# Patient Record
Sex: FEMALE | Race: WHITE | NOT HISPANIC OR LATINO | Employment: FULL TIME | URBAN - NONMETROPOLITAN AREA
[De-identification: names, ages, dates, MRNs, and addresses within clinical notes are randomized per-mention and may not be internally consistent; named-entity substitution may affect disease eponyms.]

---

## 2023-02-09 PROBLEM — R03.0 ELEVATED BLOOD PRESSURE READING: Status: ACTIVE | Noted: 2023-02-09

## 2023-02-09 PROBLEM — H10.13 ALLERGIC CONJUNCTIVITIS OF BOTH EYES: Status: ACTIVE | Noted: 2023-02-09

## 2023-02-09 PROBLEM — E03.9 HYPOTHYROIDISM: Status: ACTIVE | Noted: 2023-02-09

## 2023-02-09 PROBLEM — J02.9 SORE THROAT: Status: ACTIVE | Noted: 2023-02-09

## 2023-02-09 PROBLEM — R74.8 ELEVATED LIVER ENZYMES: Status: ACTIVE | Noted: 2023-02-09

## 2023-02-09 PROBLEM — E66.9 OBESITY: Status: ACTIVE | Noted: 2023-02-09

## 2023-02-09 PROBLEM — E04.2 MULTINODULAR GOITER: Status: ACTIVE | Noted: 2023-02-09

## 2023-02-09 PROBLEM — R25.2 LEG CRAMPS: Status: ACTIVE | Noted: 2023-02-09

## 2023-02-09 PROBLEM — J01.90 ACUTE SINUSITIS: Status: ACTIVE | Noted: 2023-02-09

## 2023-02-09 PROBLEM — J30.89 PERENNIAL ALLERGIC RHINITIS: Status: ACTIVE | Noted: 2023-02-09

## 2023-02-09 PROBLEM — M85.80 OSTEOPENIA: Status: ACTIVE | Noted: 2023-02-09

## 2023-02-09 PROBLEM — R05.3 PERSISTENT COUGH: Status: ACTIVE | Noted: 2023-02-09

## 2023-02-09 PROBLEM — J30.2 SEASONAL ALLERGIES: Status: ACTIVE | Noted: 2023-02-09

## 2023-02-09 PROBLEM — E78.5 HYPERLIPIDEMIA: Status: ACTIVE | Noted: 2023-02-09

## 2023-02-09 PROBLEM — F41.8 DEPRESSION WITH ANXIETY: Status: ACTIVE | Noted: 2023-02-09

## 2023-02-09 PROBLEM — J20.9 ACUTE BRONCHITIS: Status: ACTIVE | Noted: 2023-02-09

## 2023-02-09 PROBLEM — N95.1 MENOPAUSAL STATE: Status: ACTIVE | Noted: 2023-02-09

## 2023-02-09 RX ORDER — VITAMIN B COMPLEX
1 CAPSULE ORAL DAILY
COMMUNITY

## 2023-02-09 RX ORDER — ALBUTEROL SULFATE 90 UG/1
2 AEROSOL, METERED RESPIRATORY (INHALATION)
COMMUNITY
End: 2023-06-13 | Stop reason: SDUPTHER

## 2023-02-09 RX ORDER — FLUTICASONE PROPIONATE 50 MCG
2 SPRAY, SUSPENSION (ML) NASAL DAILY
COMMUNITY
Start: 2021-12-20 | End: 2023-06-13 | Stop reason: SDUPTHER

## 2023-02-09 RX ORDER — LORATADINE 10 MG/1
10 TABLET ORAL DAILY
COMMUNITY
End: 2023-06-13

## 2023-02-09 RX ORDER — LEVOTHYROXINE SODIUM 50 UG/1
50 TABLET ORAL DAILY
COMMUNITY
Start: 2020-08-12 | End: 2023-05-24 | Stop reason: SDUPTHER

## 2023-02-09 RX ORDER — MULTIVITAMIN
1 TABLET ORAL DAILY
COMMUNITY

## 2023-02-09 RX ORDER — PAROXETINE 30 MG/1
30 TABLET, FILM COATED ORAL DAILY
COMMUNITY
End: 2023-06-08 | Stop reason: SDUPTHER

## 2023-02-09 RX ORDER — ROSUVASTATIN CALCIUM 20 MG/1
20 TABLET, COATED ORAL DAILY
COMMUNITY
Start: 2022-10-04 | End: 2023-09-12 | Stop reason: SDDI

## 2023-03-27 ENCOUNTER — TELEPHONE (OUTPATIENT)
Dept: PRIMARY CARE | Facility: CLINIC | Age: 73
End: 2023-03-27
Payer: MEDICARE

## 2023-04-03 ENCOUNTER — APPOINTMENT (OUTPATIENT)
Dept: PRIMARY CARE | Facility: CLINIC | Age: 73
End: 2023-04-03
Payer: MEDICARE

## 2023-05-23 ENCOUNTER — TELEPHONE (OUTPATIENT)
Dept: PRIMARY CARE | Facility: CLINIC | Age: 73
End: 2023-05-23
Payer: MEDICARE

## 2023-05-23 DIAGNOSIS — E03.9 ACQUIRED HYPOTHYROIDISM: ICD-10-CM

## 2023-05-24 RX ORDER — LEVOTHYROXINE SODIUM 50 UG/1
50 TABLET ORAL DAILY
Qty: 90 TABLET | Refills: 3 | Status: SHIPPED | OUTPATIENT
Start: 2023-05-24 | End: 2024-05-17 | Stop reason: SDUPTHER

## 2023-06-08 ENCOUNTER — TELEPHONE (OUTPATIENT)
Dept: PRIMARY CARE | Facility: CLINIC | Age: 73
End: 2023-06-08
Payer: MEDICARE

## 2023-06-08 DIAGNOSIS — F41.8 DEPRESSION WITH ANXIETY: ICD-10-CM

## 2023-06-08 RX ORDER — PAROXETINE 30 MG/1
30 TABLET, FILM COATED ORAL DAILY
Qty: 90 TABLET | Refills: 0 | Status: SHIPPED | OUTPATIENT
Start: 2023-06-08 | End: 2023-09-12 | Stop reason: SDUPTHER

## 2023-06-13 ENCOUNTER — OFFICE VISIT (OUTPATIENT)
Dept: PRIMARY CARE | Facility: CLINIC | Age: 73
End: 2023-06-13
Payer: MEDICARE

## 2023-06-13 VITALS
BODY MASS INDEX: 34.87 KG/M2 | HEART RATE: 71 BPM | SYSTOLIC BLOOD PRESSURE: 122 MMHG | OXYGEN SATURATION: 95 % | WEIGHT: 209.3 LBS | HEIGHT: 65 IN | TEMPERATURE: 97.7 F | DIASTOLIC BLOOD PRESSURE: 70 MMHG

## 2023-06-13 DIAGNOSIS — J30.2 SEASONAL ALLERGIES: ICD-10-CM

## 2023-06-13 DIAGNOSIS — R05.3 PERSISTENT COUGH: ICD-10-CM

## 2023-06-13 DIAGNOSIS — J01.40 ACUTE NON-RECURRENT PANSINUSITIS: Primary | ICD-10-CM

## 2023-06-13 PROCEDURE — 1157F ADVNC CARE PLAN IN RCRD: CPT | Performed by: NURSE PRACTITIONER

## 2023-06-13 PROCEDURE — 99213 OFFICE O/P EST LOW 20 MIN: CPT | Performed by: NURSE PRACTITIONER

## 2023-06-13 RX ORDER — DOXYCYCLINE 100 MG/1
100 CAPSULE ORAL 2 TIMES DAILY
Qty: 14 CAPSULE | Refills: 0 | Status: SHIPPED | OUTPATIENT
Start: 2023-06-13 | End: 2023-06-20

## 2023-06-13 RX ORDER — FLUTICASONE PROPIONATE 50 MCG
2 SPRAY, SUSPENSION (ML) NASAL DAILY
Qty: 16 G | Refills: 2 | Status: SHIPPED | OUTPATIENT
Start: 2023-06-13 | End: 2023-09-12 | Stop reason: SDUPTHER

## 2023-06-13 RX ORDER — ALBUTEROL SULFATE 90 UG/1
2 AEROSOL, METERED RESPIRATORY (INHALATION)
Qty: 18 G | Refills: 2 | Status: SHIPPED | OUTPATIENT
Start: 2023-06-13

## 2023-06-13 ASSESSMENT — ENCOUNTER SYMPTOMS
WHEEZING: 0
SINUS PAIN: 1
LIGHT-HEADEDNESS: 0
PALPITATIONS: 0
FACIAL SWELLING: 0
VOMITING: 0
DIZZINESS: 0
COUGH: 1
SHORTNESS OF BREATH: 0
MUSCULOSKELETAL NEGATIVE: 1
HEADACHES: 1
NAUSEA: 0
SINUS PRESSURE: 1
SORE THROAT: 1
MYALGIAS: 0
TROUBLE SWALLOWING: 0
VOICE CHANGE: 0
ARTHRALGIAS: 0

## 2023-06-13 NOTE — LETTER
June 13, 2023     Patient: Dunia Moreau   YOB: 1950   Date of Visit: 6/13/2023       To Whom It May Concern:    Dunia Moreau was seen in my clinic on 6/13/2023 at 10:30 am. Please excuse Dunia for her absence from work on this day to make the appointment.  Please excuse her from 06/13/2023 through 06/15/2023 for illness.   Dunia may return to work on 06/16/2023.    If you have any questions or concerns, please don't hesitate to call.         Sincerely,         Jason Malone, APRN-CNP        CC: No Recipients

## 2023-06-13 NOTE — PROGRESS NOTES
"Subjective   Patient ID: Dunia Moreau is a 72 y.o. female who presents for Headache (Has been going on a month), Cough, and Nasal Congestion.    Cough  Patient complains of facial pain, fever, headache, myalgias, nasal congestion, nonproductive cough, and rhinorrhea. Symptoms began 1 month ago. Symptoms have been gradually worsening since that time.The cough is hoarse and is aggravated by exercise. Associated symptoms include: postnasal drip. Patient does not have new pets. Patient does not have a history of asthma. Patient does not have a history of environmental allergens. Patient has not traveled recently. Patient does not have a history of smoking. Patient has not had a previous chest x-ray. Patient has not had a PPD done.    Sinus pain, allergies \"most of the time\"  Ran out of nasal spray  Cough, sore throat, post nasal drip  Sx have slowly worsened over last month  Denies fever, does report chills/fatigue         Review of Systems   HENT:  Positive for congestion, postnasal drip, sinus pressure, sinus pain and sore throat. Negative for ear discharge, ear pain, facial swelling, trouble swallowing and voice change.    Eyes:  Negative for visual disturbance.   Respiratory:  Positive for cough. Negative for shortness of breath and wheezing.    Cardiovascular:  Negative for chest pain and palpitations.   Gastrointestinal:  Negative for nausea and vomiting.   Musculoskeletal: Negative.  Negative for arthralgias and myalgias.   Skin: Negative.    Neurological:  Positive for headaches. Negative for dizziness and light-headedness.       Objective   /70 (Patient Position: Sitting)   Pulse 71   Temp 36.5 °C (97.7 °F)   Ht 1.651 m (5' 5\")   Wt 94.9 kg (209 lb 4.8 oz)   SpO2 95%   BMI 34.83 kg/m²     Physical Exam  HENT:      Right Ear: Hearing, tympanic membrane and ear canal normal.      Left Ear: Hearing, tympanic membrane and ear canal normal.      Nose: Congestion and rhinorrhea present.      Right " Sinus: Maxillary sinus tenderness and frontal sinus tenderness present.      Left Sinus: Maxillary sinus tenderness and frontal sinus tenderness present.      Mouth/Throat:      Pharynx: Posterior oropharyngeal erythema present.   Cardiovascular:      Rate and Rhythm: Normal rate and regular rhythm.      Pulses: Normal pulses.   Pulmonary:      Effort: Pulmonary effort is normal.      Breath sounds: Normal breath sounds.   Abdominal:      General: Bowel sounds are normal.      Palpations: Abdomen is soft.      Tenderness: There is no abdominal tenderness.      Hernia: No hernia is present.   Musculoskeletal:         General: Normal range of motion.      Cervical back: Normal range of motion. No tenderness.   Lymphadenopathy:      Cervical: Cervical adenopathy present.      Right cervical: Superficial cervical adenopathy present.      Left cervical: Superficial cervical adenopathy present.   Skin:     General: Skin is warm and dry.   Neurological:      Mental Status: She is alert and oriented to person, place, and time.         Assessment/Plan   Diagnoses and all orders for this visit:  Acute non-recurrent pansinusitis  -     fluticasone (Flonase) 50 mcg/actuation nasal spray; Administer 2 sprays into each nostril once daily.  -     doxycycline (Vibramycin) 100 mg capsule; Take 1 capsule (100 mg) by mouth 2 times a day for 7 days. Take with at least 8 ounces (large glass) of water, do not lie down for 30 minutes after  Seasonal allergies  -     albuterol 90 mcg/actuation inhaler; Inhale 2 puffs 4 times a day.  Persistent cough  -     albuterol 90 mcg/actuation inhaler; Inhale 2 puffs 4 times a day.

## 2023-09-12 ENCOUNTER — LAB (OUTPATIENT)
Dept: LAB | Facility: LAB | Age: 73
End: 2023-09-12
Payer: MEDICARE

## 2023-09-12 ENCOUNTER — TELEPHONE (OUTPATIENT)
Dept: PRIMARY CARE | Facility: CLINIC | Age: 73
End: 2023-09-12

## 2023-09-12 ENCOUNTER — OFFICE VISIT (OUTPATIENT)
Dept: PRIMARY CARE | Facility: CLINIC | Age: 73
End: 2023-09-12
Payer: MEDICARE

## 2023-09-12 VITALS
WEIGHT: 209.9 LBS | HEART RATE: 55 BPM | HEIGHT: 65 IN | BODY MASS INDEX: 34.97 KG/M2 | DIASTOLIC BLOOD PRESSURE: 80 MMHG | SYSTOLIC BLOOD PRESSURE: 122 MMHG

## 2023-09-12 DIAGNOSIS — R73.01 ELEVATED FASTING BLOOD SUGAR: ICD-10-CM

## 2023-09-12 DIAGNOSIS — Z13.6 SCREENING FOR CARDIOVASCULAR CONDITION: ICD-10-CM

## 2023-09-12 DIAGNOSIS — E03.9 ACQUIRED HYPOTHYROIDISM: ICD-10-CM

## 2023-09-12 DIAGNOSIS — Z71.89 CARDIAC RISK COUNSELING: ICD-10-CM

## 2023-09-12 DIAGNOSIS — Z53.20 STATIN MEDICATION DECLINED BY PATIENT: ICD-10-CM

## 2023-09-12 DIAGNOSIS — Z12.31 ENCOUNTER FOR SCREENING MAMMOGRAM FOR BREAST CANCER: ICD-10-CM

## 2023-09-12 DIAGNOSIS — Z00.00 ROUTINE GENERAL MEDICAL EXAMINATION AT HEALTH CARE FACILITY: Primary | ICD-10-CM

## 2023-09-12 DIAGNOSIS — Z12.12 SCREENING FOR COLORECTAL CANCER: ICD-10-CM

## 2023-09-12 DIAGNOSIS — F41.8 DEPRESSION WITH ANXIETY: ICD-10-CM

## 2023-09-12 DIAGNOSIS — Z13.1 DIABETES MELLITUS SCREENING: ICD-10-CM

## 2023-09-12 DIAGNOSIS — Z12.11 SCREENING FOR COLORECTAL CANCER: ICD-10-CM

## 2023-09-12 DIAGNOSIS — J01.40 ACUTE NON-RECURRENT PANSINUSITIS: ICD-10-CM

## 2023-09-12 DIAGNOSIS — Z91.199 MEDICAL NON-COMPLIANCE: ICD-10-CM

## 2023-09-12 LAB
ALANINE AMINOTRANSFERASE (SGPT) (U/L) IN SER/PLAS: 38 U/L (ref 7–45)
ALBUMIN (G/DL) IN SER/PLAS: 3.8 G/DL (ref 3.4–5)
ALKALINE PHOSPHATASE (U/L) IN SER/PLAS: 110 U/L (ref 33–136)
ANION GAP IN SER/PLAS: 9 MMOL/L (ref 10–20)
ASPARTATE AMINOTRANSFERASE (SGOT) (U/L) IN SER/PLAS: 37 U/L (ref 9–39)
BILIRUBIN TOTAL (MG/DL) IN SER/PLAS: 1.1 MG/DL (ref 0–1.2)
CALCIUM (MG/DL) IN SER/PLAS: 9.2 MG/DL (ref 8.6–10.3)
CARBON DIOXIDE, TOTAL (MMOL/L) IN SER/PLAS: 30 MMOL/L (ref 21–32)
CHLORIDE (MMOL/L) IN SER/PLAS: 104 MMOL/L (ref 98–107)
CHOLESTEROL (MG/DL) IN SER/PLAS: 243 MG/DL (ref 0–199)
CHOLESTEROL IN HDL (MG/DL) IN SER/PLAS: 65 MG/DL
CHOLESTEROL/HDL RATIO: 3.7
CREATININE (MG/DL) IN SER/PLAS: 0.81 MG/DL (ref 0.5–1.05)
ESTIMATED AVERAGE GLUCOSE FOR HBA1C: 114 MG/DL
GFR FEMALE: 76 ML/MIN/1.73M2
GLUCOSE (MG/DL) IN SER/PLAS: 108 MG/DL (ref 74–99)
HEMOGLOBIN A1C/HEMOGLOBIN TOTAL IN BLOOD: 5.6 %
LDL: 157 MG/DL (ref 0–99)
POTASSIUM (MMOL/L) IN SER/PLAS: 4.4 MMOL/L (ref 3.5–5.3)
PROTEIN TOTAL: 6.4 G/DL (ref 6.4–8.2)
SODIUM (MMOL/L) IN SER/PLAS: 139 MMOL/L (ref 136–145)
THYROTROPIN (MIU/L) IN SER/PLAS BY DETECTION LIMIT <= 0.05 MIU/L: 4.07 MIU/L (ref 0.44–3.98)
THYROXINE (T4) FREE (NG/DL) IN SER/PLAS: 0.94 NG/DL (ref 0.61–1.12)
TRIGLYCERIDE (MG/DL) IN SER/PLAS: 106 MG/DL (ref 0–149)
UREA NITROGEN (MG/DL) IN SER/PLAS: 20 MG/DL (ref 6–23)
VLDL: 21 MG/DL (ref 0–40)

## 2023-09-12 PROCEDURE — 1170F FXNL STATUS ASSESSED: CPT | Performed by: NURSE PRACTITIONER

## 2023-09-12 PROCEDURE — 1036F TOBACCO NON-USER: CPT | Performed by: NURSE PRACTITIONER

## 2023-09-12 PROCEDURE — 1159F MED LIST DOCD IN RCRD: CPT | Performed by: NURSE PRACTITIONER

## 2023-09-12 PROCEDURE — 84439 ASSAY OF FREE THYROXINE: CPT

## 2023-09-12 PROCEDURE — 1157F ADVNC CARE PLAN IN RCRD: CPT | Performed by: NURSE PRACTITIONER

## 2023-09-12 PROCEDURE — 80061 LIPID PANEL: CPT

## 2023-09-12 PROCEDURE — 36415 COLL VENOUS BLD VENIPUNCTURE: CPT

## 2023-09-12 PROCEDURE — 80053 COMPREHEN METABOLIC PANEL: CPT

## 2023-09-12 PROCEDURE — 84443 ASSAY THYROID STIM HORMONE: CPT

## 2023-09-12 PROCEDURE — 99213 OFFICE O/P EST LOW 20 MIN: CPT | Performed by: NURSE PRACTITIONER

## 2023-09-12 PROCEDURE — 83036 HEMOGLOBIN GLYCOSYLATED A1C: CPT

## 2023-09-12 PROCEDURE — G0439 PPPS, SUBSEQ VISIT: HCPCS | Performed by: NURSE PRACTITIONER

## 2023-09-12 PROCEDURE — 1160F RVW MEDS BY RX/DR IN RCRD: CPT | Performed by: NURSE PRACTITIONER

## 2023-09-12 RX ORDER — PAROXETINE 30 MG/1
30 TABLET, FILM COATED ORAL DAILY
Qty: 90 TABLET | Refills: 1 | Status: SHIPPED | OUTPATIENT
Start: 2023-09-12 | End: 2023-09-12 | Stop reason: SDUPTHER

## 2023-09-12 RX ORDER — UBIDECARENONE 30 MG
30 CAPSULE ORAL DAILY
COMMUNITY

## 2023-09-12 RX ORDER — OMEGA-3-ACID ETHYL ESTERS 1 G/1
1 CAPSULE, LIQUID FILLED ORAL DAILY
COMMUNITY

## 2023-09-12 RX ORDER — FLUTICASONE PROPIONATE 50 MCG
2 SPRAY, SUSPENSION (ML) NASAL DAILY
Qty: 16 G | Refills: 2 | Status: SHIPPED | OUTPATIENT
Start: 2023-09-12

## 2023-09-12 RX ORDER — PAROXETINE 30 MG/1
30 TABLET, FILM COATED ORAL DAILY
Qty: 90 TABLET | Refills: 1 | Status: SHIPPED | OUTPATIENT
Start: 2023-09-12 | End: 2024-03-10

## 2023-09-12 ASSESSMENT — PATIENT HEALTH QUESTIONNAIRE - PHQ9
1. LITTLE INTEREST OR PLEASURE IN DOING THINGS: NOT AT ALL
2. FEELING DOWN, DEPRESSED OR HOPELESS: NOT AT ALL
SUM OF ALL RESPONSES TO PHQ9 QUESTIONS 1 AND 2: 0
1. LITTLE INTEREST OR PLEASURE IN DOING THINGS: NOT AT ALL
SUM OF ALL RESPONSES TO PHQ9 QUESTIONS 1 AND 2: 0
2. FEELING DOWN, DEPRESSED OR HOPELESS: NOT AT ALL

## 2023-09-12 ASSESSMENT — ACTIVITIES OF DAILY LIVING (ADL)
TAKING_MEDICATION: INDEPENDENT
DOING_HOUSEWORK: INDEPENDENT
MANAGING_FINANCES: INDEPENDENT
DRESSING: INDEPENDENT
GROCERY_SHOPPING: INDEPENDENT
BATHING: INDEPENDENT

## 2023-09-12 ASSESSMENT — ENCOUNTER SYMPTOMS
DEPRESSION: 0
LOSS OF SENSATION IN FEET: 0
OCCASIONAL FEELINGS OF UNSTEADINESS: 0

## 2023-09-12 NOTE — PROGRESS NOTES
"Subjective   Patient ID: Dunia Moreau is a 73 y.o. female who presents for Medicare Annual Wellness Visit Subsequent.    Vaccine UTD  Declines Flu vaccine  No prior colonoscopy, would like to get colonoscopy   Annual labs due  Declines statin therapy despite 1.5 ASCVD risk, discussed risks with patient.          Review of Systems    Objective   /80 (Patient Position: Sitting)   Pulse 55   Ht 1.651 m (5' 5\")   Wt 95.2 kg (209 lb 14.4 oz)   BMI 34.93 kg/m²     Physical Exam  Vitals reviewed.   Constitutional:       General: She is not in acute distress.     Appearance: Normal appearance. She is obese.   Cardiovascular:      Rate and Rhythm: Normal rate and regular rhythm.      Pulses: Normal pulses.   Pulmonary:      Effort: Pulmonary effort is normal.      Breath sounds: Normal breath sounds.   Abdominal:      General: Bowel sounds are normal.      Palpations: Abdomen is soft.      Tenderness: There is no guarding.   Skin:     General: Skin is warm and dry.   Neurological:      Mental Status: She is alert and oriented to person, place, and time.   Psychiatric:         Mood and Affect: Mood normal.         Assessment/Plan   Diagnoses and all orders for this visit:  Routine general medical examination at health care facility  Acute non-recurrent pansinusitis  -     fluticasone (Flonase) 50 mcg/actuation nasal spray; Administer 2 sprays into each nostril once daily.  Depression with anxiety  -     Comprehensive Metabolic Panel; Future  Statin medication declined by patient  Medical non-compliance  Cardiac risk counseling   -Patent is aware of the risks associated with elevated cholesterol with LDL >150.   Elevated fasting blood sugar  -     Hemoglobin A1C; Future  Diabetes mellitus screening  -     Hemoglobin A1C; Future  Screening for cardiovascular condition  -     Lipid panel; Future  Encounter for screening mammogram for breast cancer  -     BI mammo bilateral screening tomosynthesis; Future  Screening " for colorectal cancer  -     Colonoscopy Screening; Future  Acquired hypothyroidism  -     TSH with reflex to Free T4 if abnormal; Future  -Continue on current dose of levothyroxine, will adjust as needed based on lab result  Other orders  -     Follow Up In Primary Care - Health Maintenance; Future

## 2023-09-12 NOTE — PROGRESS NOTES
"Advance Care Planning Note     Discussion Date: 09/12/23   Discussion Participants: patient    The patient wishes to discuss Advance Care Planning today and the following is a brief summary of our discussion.     Patient has capacity to make their own medical decisions: Yes  Health Care Agent/Surrogate Decision Maker documented in chart: Yes son Darell Jarrell or daughter Bruno Moreau     Documents on file and valid:  Advance Directive/Living Will:  has but not on file in office    Health Care Power of : Yes  Other: Unsure regarding DNR, for now full code    Communication of Medical Status/Prognosis:   Sates she is a organ donor, currently medical prognosis is good     Communication of Treatment Goals/Options:        Treatment Decisions  Goals of Care: quality of life is prioritized; willing to accept low-burden treatments to achieve meaningful goals   \"No heroic efforts\"  Follow Up Plan  Annually   Team Members  Patient and provider  Time Statement: Total face to face time spent on advance care planning was 20 minutes with 8 minutes spent in counseling, including the explanation.    Jason Malone, JOSE ELIAS-CNP  9/12/2023 9:45 AM  "

## 2023-10-17 ENCOUNTER — HOSPITAL ENCOUNTER (OUTPATIENT)
Dept: GASTROENTEROLOGY | Facility: CLINIC | Age: 73
Discharge: HOME | End: 2023-10-17
Payer: MEDICARE

## 2023-10-17 VITALS
SYSTOLIC BLOOD PRESSURE: 142 MMHG | WEIGHT: 203.71 LBS | TEMPERATURE: 97.4 F | BODY MASS INDEX: 34.78 KG/M2 | RESPIRATION RATE: 16 BRPM | HEART RATE: 51 BPM | OXYGEN SATURATION: 98 % | HEIGHT: 64 IN | DIASTOLIC BLOOD PRESSURE: 62 MMHG

## 2023-10-17 DIAGNOSIS — Z12.12 SCREENING FOR COLORECTAL CANCER: Primary | ICD-10-CM

## 2023-10-17 DIAGNOSIS — Z12.11 SCREENING FOR COLORECTAL CANCER: Primary | ICD-10-CM

## 2023-10-17 PROCEDURE — 99153 MOD SED SAME PHYS/QHP EA: CPT | Performed by: INTERNAL MEDICINE

## 2023-10-17 PROCEDURE — 88305 TISSUE EXAM BY PATHOLOGIST: CPT | Mod: TC,SAMLAB | Performed by: INTERNAL MEDICINE

## 2023-10-17 PROCEDURE — 7100000009 HC PHASE TWO TIME - INITIAL BASE CHARGE

## 2023-10-17 PROCEDURE — 3700000013 HC SEDATION LEVEL 5+ TIME - EACH ADDITIONAL 15 MINUTES

## 2023-10-17 PROCEDURE — 2500000004 HC RX 250 GENERAL PHARMACY W/ HCPCS (ALT 636 FOR OP/ED): Performed by: INTERNAL MEDICINE

## 2023-10-17 PROCEDURE — 2580000001 HC RX 258 IV SOLUTIONS: Performed by: INTERNAL MEDICINE

## 2023-10-17 PROCEDURE — 45380 COLONOSCOPY AND BIOPSY: CPT | Performed by: INTERNAL MEDICINE

## 2023-10-17 PROCEDURE — 3700000012 HC SEDATION LEVEL 5+ TIME - INITIAL 15 MINUTES 5/> YEARS

## 2023-10-17 PROCEDURE — 7100000010 HC PHASE TWO TIME - EACH INCREMENTAL 1 MINUTE

## 2023-10-17 PROCEDURE — 88305 TISSUE EXAM BY PATHOLOGIST: CPT | Mod: TC,SUR,SAMLAB | Performed by: INTERNAL MEDICINE

## 2023-10-17 PROCEDURE — 99152 MOD SED SAME PHYS/QHP 5/>YRS: CPT | Performed by: INTERNAL MEDICINE

## 2023-10-17 PROCEDURE — 88305 TISSUE EXAM BY PATHOLOGIST: CPT

## 2023-10-17 RX ORDER — MEPERIDINE HYDROCHLORIDE 25 MG/ML
INJECTION INTRAMUSCULAR; INTRAVENOUS; SUBCUTANEOUS AS NEEDED
Status: COMPLETED | OUTPATIENT
Start: 2023-10-17 | End: 2023-10-17

## 2023-10-17 RX ORDER — SODIUM CHLORIDE, SODIUM LACTATE, POTASSIUM CHLORIDE, CALCIUM CHLORIDE 600; 310; 30; 20 MG/100ML; MG/100ML; MG/100ML; MG/100ML
20 INJECTION, SOLUTION INTRAVENOUS CONTINUOUS
Status: DISCONTINUED | OUTPATIENT
Start: 2023-10-17 | End: 2023-10-20 | Stop reason: HOSPADM

## 2023-10-17 RX ORDER — MIDAZOLAM HYDROCHLORIDE 5 MG/ML
INJECTION, SOLUTION INTRAMUSCULAR; INTRAVENOUS AS NEEDED
Status: COMPLETED | OUTPATIENT
Start: 2023-10-17 | End: 2023-10-17

## 2023-10-17 RX ADMIN — MIDAZOLAM HYDROCHLORIDE 2.5 MG: 5 INJECTION, SOLUTION INTRAMUSCULAR; INTRAVENOUS at 09:43

## 2023-10-17 RX ADMIN — MEPERIDINE HYDROCHLORIDE 25 MG: 25 INJECTION INTRAMUSCULAR; INTRAVENOUS; SUBCUTANEOUS at 09:38

## 2023-10-17 RX ADMIN — MIDAZOLAM HYDROCHLORIDE 1 MG: 5 INJECTION, SOLUTION INTRAMUSCULAR; INTRAVENOUS at 09:48

## 2023-10-17 RX ADMIN — GLUCAGON HYDROCHLORIDE 1 MG: KIT at 09:39

## 2023-10-17 RX ADMIN — SODIUM CHLORIDE, POTASSIUM CHLORIDE, SODIUM LACTATE AND CALCIUM CHLORIDE 20 ML/HR: 600; 310; 30; 20 INJECTION, SOLUTION INTRAVENOUS at 08:52

## 2023-10-17 RX ADMIN — MIDAZOLAM HYDROCHLORIDE 2.5 MG: 5 INJECTION, SOLUTION INTRAMUSCULAR; INTRAVENOUS at 09:38

## 2023-10-17 RX ADMIN — MEPERIDINE HYDROCHLORIDE 25 MG: 25 INJECTION INTRAMUSCULAR; INTRAVENOUS; SUBCUTANEOUS at 09:42

## 2023-10-17 ASSESSMENT — PAIN SCALES - GENERAL
PAINLEVEL_OUTOF10: 2
PAINLEVEL_OUTOF10: 0 - NO PAIN
PAINLEVEL_OUTOF10: 2
PAINLEVEL_OUTOF10: 0 - NO PAIN
PAINLEVEL_OUTOF10: 2
PAINLEVEL_OUTOF10: 0 - NO PAIN

## 2023-10-17 ASSESSMENT — PAIN - FUNCTIONAL ASSESSMENT: PAIN_FUNCTIONAL_ASSESSMENT: 0-10

## 2023-10-17 NOTE — H&P
History Of Present Illness  Erin Moreau is a 73 y.o. female presenting with desire for screening colonoscopy.     Past Medical History  Past Medical History:   Diagnosis Date    Anxiety     Depression     Disease of thyroid gland     Hypothyroid        Surgical History  Past Surgical History:   Procedure Laterality Date    OTHER SURGICAL HISTORY  12/09/2019    Bunionectomy    OTHER SURGICAL HISTORY  12/09/2019    Hysteroscopy    OTHER SURGICAL HISTORY  12/09/2019    Loop electrosurgical excision procedure    OTHER SURGICAL HISTORY  12/09/2019    Dilation and curettage    OTHER SURGICAL HISTORY  12/09/2019    Esophagogastroduodenoscopy    OTHER SURGICAL HISTORY  12/09/2019    Tonsillectomy    OTHER SURGICAL HISTORY  09/30/2020    Hip replacement        Social History  She reports that she has never smoked. She has never used smokeless tobacco. She reports that she does not drink alcohol and does not use drugs.    Family History  No family history on file.     Allergies  Amoxicillin, Biotin, Penicillins, and Pravastatin    Review of Systems   All other systems reviewed and are negative.       Physical Exam  Vitals reviewed.   Constitutional:       General: She is awake.   Cardiovascular:      Comments: No overt chest pain  Pulmonary:      Effort: Pulmonary effort is normal.      Breath sounds: Normal breath sounds.   Abdominal:      General: Bowel sounds are normal.      Palpations: Abdomen is soft.      Tenderness: There is no abdominal tenderness.   Neurological:      Mental Status: She is alert and oriented to person, place, and time.   Psychiatric:         Attention and Perception: Attention and perception normal.         Behavior: Behavior normal.          Last Recorded Vitals  There were no vitals taken for this visit.    Relevant Results             Assessment/Plan   Active Problems:  There are no active Hospital Problems.      Problem List Items Addressed This Visit       Screening for colorectal cancer -  Primary    Relevant Orders    Colonoscopy Screening            I spent  minutes in the professional and overall care of this patient.      Ede Nunez, DO

## 2023-10-17 NOTE — PRE-SEDATION DOCUMENTATION
Patient: Erin Moreau  MRN: [id number]    Pre-sedation Evaluation:  Sedation necessary for: Analgesia  Requesting service: GI    History of Present Illness: Colon cancer screening     Past Medical History:   Diagnosis Date    Anxiety     Depression     Disease of thyroid gland     Hypothyroid        Principle problems:  Patient Active Problem List    Diagnosis Date Noted    Screening for colorectal cancer 10/17/2023    Acute bronchitis 02/09/2023    Acute sinusitis 02/09/2023    Allergic conjunctivitis of both eyes 02/09/2023    Depression with anxiety 02/09/2023    Elevated blood pressure reading 02/09/2023    Elevated liver enzymes 02/09/2023    Hyperlipidemia 02/09/2023    Hypothyroidism 02/09/2023    Leg cramps 02/09/2023    Menopausal state 02/09/2023    Multinodular goiter 02/09/2023    Obesity 02/09/2023    Osteopenia 02/09/2023    Perennial allergic rhinitis 02/09/2023    Persistent cough 02/09/2023    Seasonal allergies 02/09/2023    Sore throat 02/09/2023     Allergies:  Allergies   Allergen Reactions    Amoxicillin Itching    Biotin Swelling    Penicillins Itching    Pravastatin Other     PTA/Current Medications:  (Not in a hospital admission)    Current Outpatient Medications   Medication Sig Dispense Refill    fluticasone (Flonase) 50 mcg/actuation nasal spray Administer 2 sprays into each nostril once daily. 16 g 2    levothyroxine (Synthroid, Levoxyl) 50 mcg tablet Take 1 tablet (50 mcg) by mouth once daily. 90 tablet 3    PARoxetine (Paxil) 30 mg tablet Take 1 tablet (30 mg) by mouth once daily. 90 tablet 1    albuterol 90 mcg/actuation inhaler Inhale 2 puffs 4 times a day. (Patient not taking: Reported on 10/17/2023) 18 g 2    b complex vitamins (Vitamins B Complex) capsule Take 1 capsule by mouth once daily.      CALCIUM CITRATE-VITAMIN D3 ORAL Take 1 tablet by mouth 1 (one) time each day.      co-enzyme Q-10 30 mg capsule Take 1 capsule (30 mg) by mouth once daily.      magnesium oxide-Mg AA  chelate (Magnesium, oxide/AA chelate,) 300 mg capsule Take 1 capsule (300 mg) by mouth once daily.      multivitamin tablet Take 1 tablet by mouth once daily.      omega-3 acid ethyl esters (Lovaza) 1 gram capsule Take 1 capsule (1 g) by mouth once daily.       Current Facility-Administered Medications   Medication Dose Route Frequency Provider Last Rate Last Admin    lactated Ringer's infusion  20 mL/hr intravenous Continuous Ede Nunez DO         Past Surgical History:   has a past surgical history that includes Other surgical history (12/09/2019); Other surgical history (12/09/2019); Other surgical history (12/09/2019); Other surgical history (12/09/2019); Other surgical history (12/09/2019); Other surgical history (12/09/2019); and Other surgical history (09/30/2020).    Recent sedation/surgery (24 hours) No    Review of Systems:  Please check all that apply: No significant medical history    Pregnancy test completed prior to procedure on any menstruating female: none        NPO guidelines met: yes    Physical Exam    Airway  Mallampati: II     Cardiovascular   Rhythm: regular  Rate: normal     Dental    Pulmonary - normal exam         Plan    ASA 2

## 2023-10-18 ENCOUNTER — TREATMENT (OUTPATIENT)
Dept: PHYSICAL THERAPY | Facility: CLINIC | Age: 73
End: 2023-10-18

## 2023-10-18 DIAGNOSIS — M25.551 RIGHT HIP PAIN: Primary | ICD-10-CM

## 2023-10-18 DIAGNOSIS — M53.86 SCIATICA OF RIGHT SIDE ASSOCIATED WITH DISORDER OF LUMBAR SPINE: ICD-10-CM

## 2023-10-18 PROCEDURE — 4200000004 HC PT PHASE II 15 MIN CHG: Mod: GP | Performed by: PHYSICAL THERAPIST

## 2023-10-18 ASSESSMENT — PAIN - FUNCTIONAL ASSESSMENT: PAIN_FUNCTIONAL_ASSESSMENT: 0-10

## 2023-10-18 ASSESSMENT — PAIN SCALES - GENERAL: PAINLEVEL_OUTOF10: 3

## 2023-10-18 NOTE — PROGRESS NOTES
Physical Therapy Treatment    Patient Name: Dunia Moreau  MRN: [id number]  Today's Date: 10/18/2023         Assessment:   TDN performed by Madalyn Zabala, PT, DPT at end of session without any adverse side effects and verbalized good understanding of all edu provided concerning TDN. Added Estim to TDN with good response and ended session with cryotherapy.    Plan:   Up to 1xweek PRN     Current Problem  1. Right hip pain        2. 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   Estim to TDN x 8'  Cryotherapy after session       Goals:

## 2023-10-24 LAB
LABORATORY COMMENT REPORT: NORMAL
PATH REPORT.COMMENTS IMP SPEC: NORMAL
PATH REPORT.FINAL DX SPEC: NORMAL
PATH REPORT.GROSS SPEC: NORMAL
PATH REPORT.RELEVANT HX SPEC: NORMAL
PATH REPORT.TOTAL CANCER: NORMAL

## 2023-10-25 ENCOUNTER — TELEPHONE (OUTPATIENT)
Dept: GASTROENTEROLOGY | Facility: CLINIC | Age: 73
End: 2023-10-25
Payer: MEDICARE

## 2023-10-25 NOTE — TELEPHONE ENCOUNTER
----- Message from Ede Nunez DO sent at 10/25/2023  7:37 AM EDT -----  Polyp from rectum was benign colonic tissue likely representing rectal prolapse tissue.  There is no increased risk of colon cancer.  Given her age and no family history of colon cancer would recommend no further screening.  Colonoscopy being performed only if symptomatic

## 2023-11-20 ENCOUNTER — OFFICE VISIT (OUTPATIENT)
Dept: PRIMARY CARE | Facility: CLINIC | Age: 73
End: 2023-11-20
Payer: MEDICARE

## 2023-11-20 ENCOUNTER — ANCILLARY PROCEDURE (OUTPATIENT)
Dept: RADIOLOGY | Facility: CLINIC | Age: 73
End: 2023-11-20
Payer: MEDICARE

## 2023-11-20 VITALS
HEIGHT: 64 IN | HEART RATE: 64 BPM | WEIGHT: 206 LBS | BODY MASS INDEX: 35.17 KG/M2 | TEMPERATURE: 98 F | SYSTOLIC BLOOD PRESSURE: 150 MMHG | DIASTOLIC BLOOD PRESSURE: 82 MMHG

## 2023-11-20 DIAGNOSIS — M25.562 ACUTE PAIN OF LEFT KNEE: Primary | ICD-10-CM

## 2023-11-20 DIAGNOSIS — M25.562 ACUTE PAIN OF LEFT KNEE: ICD-10-CM

## 2023-11-20 PROCEDURE — 1160F RVW MEDS BY RX/DR IN RCRD: CPT

## 2023-11-20 PROCEDURE — 73560 X-RAY EXAM OF KNEE 1 OR 2: CPT | Mod: LT

## 2023-11-20 PROCEDURE — 1125F AMNT PAIN NOTED PAIN PRSNT: CPT

## 2023-11-20 PROCEDURE — 1036F TOBACCO NON-USER: CPT

## 2023-11-20 PROCEDURE — 99213 OFFICE O/P EST LOW 20 MIN: CPT

## 2023-11-20 PROCEDURE — 1159F MED LIST DOCD IN RCRD: CPT

## 2023-11-20 PROCEDURE — 73560 X-RAY EXAM OF KNEE 1 OR 2: CPT | Mod: LEFT SIDE | Performed by: RADIOLOGY

## 2023-11-20 RX ORDER — MELOXICAM 15 MG/1
15 TABLET ORAL DAILY
Qty: 30 TABLET | Refills: 0 | Status: SHIPPED | OUTPATIENT
Start: 2023-11-20 | End: 2023-12-20

## 2023-11-20 RX ORDER — CEPHALEXIN 500 MG/1
500 CAPSULE ORAL 4 TIMES DAILY
Qty: 28 CAPSULE | Refills: 0 | Status: SHIPPED | OUTPATIENT
Start: 2023-11-20 | End: 2023-12-01 | Stop reason: ALTCHOICE

## 2023-11-20 ASSESSMENT — ENCOUNTER SYMPTOMS
RESPIRATORY NEGATIVE: 1
CARDIOVASCULAR NEGATIVE: 1
GASTROINTESTINAL NEGATIVE: 1
CONSTITUTIONAL NEGATIVE: 1

## 2023-11-20 NOTE — PROGRESS NOTES
"Subjective   Patient ID: Erin Moreau is a 73 y.o. female who presents for pt c/o left knee pain, swelling x 5 days .    HPI   KNEE PAIN: 5 days L knee pain, hx of abscess which was lanced about 25 years ago to the front of this knee. No recent travel. Wells criteria score of 0 for DVT. Pain is to anterior knee. Mild swelling to knee. No erythema or significant warmth. Advil somewhat helpful. She does stand and walk for her occupation.    Review of Systems   Constitutional: Negative.    Respiratory: Negative.     Cardiovascular: Negative.    Gastrointestinal: Negative.        Objective   /82   Pulse 64   Temp 36.7 °C (98 °F)   Ht 1.622 m (5' 3.86\")   Wt 93.4 kg (206 lb)   BMI 35.52 kg/m²     Physical Exam  Constitutional:       General: She is not in acute distress.     Appearance: Normal appearance. She is not ill-appearing.   HENT:      Head: Normocephalic and atraumatic.   Eyes:      Extraocular Movements: Extraocular movements intact.      Conjunctiva/sclera: Conjunctivae normal.   Cardiovascular:      Rate and Rhythm: Normal rate.   Pulmonary:      Effort: Pulmonary effort is normal.   Abdominal:      General: There is no distension.   Musculoskeletal:         General: No tenderness or signs of injury. Normal range of motion.      Cervical back: Normal range of motion.      Comments: No tenderness to palpation of L knee joint, there is a popliteal cyst to back of L knee   Skin:     General: Skin is warm and dry.      Findings: No erythema or rash.   Neurological:      General: No focal deficit present.      Mental Status: She is alert and oriented to person, place, and time.   Psychiatric:         Mood and Affect: Mood normal.         Behavior: Behavior normal.         Thought Content: Thought content normal.         Judgment: Judgment normal.         Assessment/Plan        Rx Keflex x5 days  Rx meloxicam  Knee xray today  Advised use Volteran gel/RICE at home  Advised to call if symptoms not " improving

## 2023-11-28 ENCOUNTER — TELEPHONE (OUTPATIENT)
Dept: PRIMARY CARE | Facility: CLINIC | Age: 73
End: 2023-11-28
Payer: MEDICARE

## 2023-11-28 NOTE — TELEPHONE ENCOUNTER
Patient called and said she has been off work since 11/19. Im not sure what this is about but wanted me to give the message to you

## 2023-12-01 ENCOUNTER — OFFICE VISIT (OUTPATIENT)
Dept: PRIMARY CARE | Facility: CLINIC | Age: 73
End: 2023-12-01
Payer: MEDICARE

## 2023-12-01 VITALS
BODY MASS INDEX: 35 KG/M2 | TEMPERATURE: 97.1 F | DIASTOLIC BLOOD PRESSURE: 78 MMHG | WEIGHT: 205 LBS | SYSTOLIC BLOOD PRESSURE: 128 MMHG | HEART RATE: 60 BPM | HEIGHT: 64 IN

## 2023-12-01 DIAGNOSIS — M25.562 ACUTE PAIN OF LEFT KNEE: ICD-10-CM

## 2023-12-01 DIAGNOSIS — J06.9 UPPER RESPIRATORY TRACT INFECTION, UNSPECIFIED TYPE: Primary | ICD-10-CM

## 2023-12-01 PROCEDURE — 1036F TOBACCO NON-USER: CPT

## 2023-12-01 PROCEDURE — 1159F MED LIST DOCD IN RCRD: CPT

## 2023-12-01 PROCEDURE — 1125F AMNT PAIN NOTED PAIN PRSNT: CPT

## 2023-12-01 PROCEDURE — 99213 OFFICE O/P EST LOW 20 MIN: CPT

## 2023-12-01 PROCEDURE — 1160F RVW MEDS BY RX/DR IN RCRD: CPT

## 2023-12-01 RX ORDER — AZITHROMYCIN 250 MG/1
TABLET, FILM COATED ORAL
Qty: 6 TABLET | Refills: 0 | Status: SHIPPED | OUTPATIENT
Start: 2023-12-01 | End: 2023-12-06

## 2023-12-01 ASSESSMENT — ENCOUNTER SYMPTOMS
CONSTITUTIONAL NEGATIVE: 1
RESPIRATORY NEGATIVE: 1
CARDIOVASCULAR NEGATIVE: 1
GASTROINTESTINAL NEGATIVE: 1

## 2023-12-01 NOTE — PROGRESS NOTES
"Subjective   Patient ID: Erin Moreau is a 73 y.o. female who presents for pt here for f/u left knee pain, still swollen and painful (Pt also c/o feeling sweaty , ear pain and vertigo ).    HPI   Still with L knee pain daily, pain is located to both medial and lateral sides of knee joint, no warmth/redness, it does feel somewhat improved, she has not been taking meloxicam, has not been compressing, has not been using Volteran gel. Knee Xray showed mild arthritis.    About 3 days of sinus congestion, BL ear fulness, eye watering, no sore throat, no fever.    Review of Systems   Constitutional: Negative.    Respiratory: Negative.     Cardiovascular: Negative.    Gastrointestinal: Negative.        Objective   /78   Pulse 60   Temp 36.2 °C (97.1 °F)   Ht 1.622 m (5' 3.86\")   Wt 93 kg (205 lb)   BMI 35.34 kg/m²     Physical Exam  Constitutional:       General: She is not in acute distress.     Appearance: Normal appearance. She is not ill-appearing.   HENT:      Head: Normocephalic and atraumatic.   Eyes:      Extraocular Movements: Extraocular movements intact.      Conjunctiva/sclera: Conjunctivae normal.   Cardiovascular:      Rate and Rhythm: Normal rate.   Pulmonary:      Effort: Pulmonary effort is normal.   Abdominal:      General: There is no distension.   Musculoskeletal:         General: Tenderness present. Normal range of motion.      Cervical back: Normal range of motion.   Skin:     General: Skin is warm and dry.   Neurological:      General: No focal deficit present.      Mental Status: She is alert and oriented to person, place, and time.   Psychiatric:         Mood and Affect: Mood normal.         Behavior: Behavior normal.         Thought Content: Thought content normal.         Judgment: Judgment normal.         Assessment/Plan        Encouraged her to take meloxicma/RICE/Voteran gel  Rx Zpack for URI  I will write her off through 12/10 and if she is not feeling better refer to " ortho.  Follow up prn.

## 2024-01-11 ENCOUNTER — OFFICE VISIT (OUTPATIENT)
Dept: GASTROENTEROLOGY | Facility: CLINIC | Age: 74
End: 2024-01-11
Payer: COMMERCIAL

## 2024-01-11 VITALS — WEIGHT: 207.6 LBS | BODY MASS INDEX: 36.79 KG/M2 | HEIGHT: 63 IN

## 2024-01-11 DIAGNOSIS — K21.9 GASTROESOPHAGEAL REFLUX DISEASE WITHOUT ESOPHAGITIS: Primary | ICD-10-CM

## 2024-01-11 DIAGNOSIS — R13.19 ESOPHAGEAL DYSPHAGIA: ICD-10-CM

## 2024-01-11 DIAGNOSIS — R13.10 DYSPHAGIA, UNSPECIFIED TYPE: ICD-10-CM

## 2024-01-11 PROCEDURE — 1125F AMNT PAIN NOTED PAIN PRSNT: CPT | Performed by: INTERNAL MEDICINE

## 2024-01-11 PROCEDURE — 99214 OFFICE O/P EST MOD 30 MIN: CPT | Performed by: INTERNAL MEDICINE

## 2024-01-11 PROCEDURE — 1160F RVW MEDS BY RX/DR IN RCRD: CPT | Performed by: INTERNAL MEDICINE

## 2024-01-11 PROCEDURE — 1036F TOBACCO NON-USER: CPT | Performed by: INTERNAL MEDICINE

## 2024-01-11 PROCEDURE — 1159F MED LIST DOCD IN RCRD: CPT | Performed by: INTERNAL MEDICINE

## 2024-01-11 RX ORDER — PANTOPRAZOLE SODIUM 40 MG/1
40 TABLET, DELAYED RELEASE ORAL DAILY
Qty: 30 TABLET | Refills: 11 | Status: SHIPPED | OUTPATIENT
Start: 2024-01-11 | End: 2025-01-10

## 2024-01-11 ASSESSMENT — ENCOUNTER SYMPTOMS
ENDOCRINE NEGATIVE: 1
CARDIOVASCULAR NEGATIVE: 1
HEMATOLOGIC/LYMPHATIC NEGATIVE: 1
EYES NEGATIVE: 1
CONSTITUTIONAL NEGATIVE: 1
RESPIRATORY NEGATIVE: 1
NEUROLOGICAL NEGATIVE: 1

## 2024-01-11 NOTE — PROGRESS NOTES
Subjective   Patient ID: Erin Moreau is a 73 y.o. female.    HPI 73-year-old female presents with worsening reflux symptoms and dysphagia.  Dysphagia likely to solids such as bread rice and chicken.  Denies any true foreign body.  Denies any diarrhea recent colonoscopy unremarkable.  Currently on Pepcid with no improvement in symptoms.    Review of Systems   Constitutional: Negative.    HENT: Negative.     Eyes: Negative.    Respiratory: Negative.     Cardiovascular: Negative.    Endocrine: Negative.    Genitourinary: Negative.    Neurological: Negative.    Hematological: Negative.        Objective   Physical Exam  Vitals and nursing note reviewed.   Constitutional:       Appearance: Normal appearance.   HENT:      Head: Normocephalic.      Mouth/Throat:      Mouth: Mucous membranes are moist.      Pharynx: Oropharynx is clear.   Eyes:      Conjunctiva/sclera: Conjunctivae normal.      Pupils: Pupils are equal, round, and reactive to light.   Cardiovascular:      Pulses: Normal pulses.      Heart sounds: Normal heart sounds.   Pulmonary:      Effort: Pulmonary effort is normal.      Breath sounds: Normal breath sounds.   Abdominal:      General: Abdomen is flat. Bowel sounds are normal.      Palpations: Abdomen is soft.   Musculoskeletal:      Cervical back: Normal range of motion and neck supple.   Skin:     General: Skin is warm and dry.   Neurological:      General: No focal deficit present.      Mental Status: She is alert and oriented to person, place, and time.   Psychiatric:         Behavior: Behavior normal.         Assessment/Plan   There are no diagnoses linked to this encounter.    Problem List Items Addressed This Visit       Gastroesophageal reflux disease without esophagitis - Primary    Relevant Medications    pantoprazole (ProtoNix) 40 mg EC tablet    Esophageal dysphagia    Relevant Medications    pantoprazole (ProtoNix) 40 mg EC tablet     Recommend she begin pantoprazole 40 mg daily.  Arrange  EGD.

## 2024-01-24 ENCOUNTER — HOSPITAL ENCOUNTER (OUTPATIENT)
Dept: GASTROENTEROLOGY | Facility: CLINIC | Age: 74
Setting detail: OUTPATIENT SURGERY
Discharge: HOME | End: 2024-01-24
Payer: COMMERCIAL

## 2024-01-24 VITALS
DIASTOLIC BLOOD PRESSURE: 69 MMHG | SYSTOLIC BLOOD PRESSURE: 153 MMHG | WEIGHT: 206 LBS | TEMPERATURE: 97.1 F | HEIGHT: 64 IN | OXYGEN SATURATION: 97 % | BODY MASS INDEX: 35.17 KG/M2 | HEART RATE: 58 BPM | RESPIRATION RATE: 16 BRPM

## 2024-01-24 DIAGNOSIS — R13.10 DYSPHAGIA, UNSPECIFIED TYPE: Primary | ICD-10-CM

## 2024-01-24 PROCEDURE — 0753T DGTZ GLS MCRSCP SLD LEVEL IV: CPT | Mod: TC,SUR,SAMLAB,WESLAB | Performed by: INTERNAL MEDICINE

## 2024-01-24 PROCEDURE — 43239 EGD BIOPSY SINGLE/MULTIPLE: CPT | Performed by: INTERNAL MEDICINE

## 2024-01-24 PROCEDURE — 2500000005 HC RX 250 GENERAL PHARMACY W/O HCPCS: Performed by: INTERNAL MEDICINE

## 2024-01-24 PROCEDURE — 7100000009 HC PHASE TWO TIME - INITIAL BASE CHARGE

## 2024-01-24 PROCEDURE — 88305 TISSUE EXAM BY PATHOLOGIST: CPT | Performed by: PATHOLOGY

## 2024-01-24 PROCEDURE — G0500 MOD SEDAT ENDO SERVICE >5YRS: HCPCS | Performed by: INTERNAL MEDICINE

## 2024-01-24 PROCEDURE — 2500000004 HC RX 250 GENERAL PHARMACY W/ HCPCS (ALT 636 FOR OP/ED): Performed by: INTERNAL MEDICINE

## 2024-01-24 PROCEDURE — 99152 MOD SED SAME PHYS/QHP 5/>YRS: CPT | Performed by: INTERNAL MEDICINE

## 2024-01-24 PROCEDURE — 3700000012 HC SEDATION LEVEL 5+ TIME - INITIAL 15 MINUTES 5/> YEARS

## 2024-01-24 PROCEDURE — 7100000010 HC PHASE TWO TIME - EACH INCREMENTAL 1 MINUTE

## 2024-01-24 RX ORDER — FENTANYL CITRATE 50 UG/ML
INJECTION, SOLUTION INTRAMUSCULAR; INTRAVENOUS AS NEEDED
Status: COMPLETED | OUTPATIENT
Start: 2024-01-24 | End: 2024-01-24

## 2024-01-24 RX ORDER — SODIUM CHLORIDE, SODIUM LACTATE, POTASSIUM CHLORIDE, CALCIUM CHLORIDE 600; 310; 30; 20 MG/100ML; MG/100ML; MG/100ML; MG/100ML
20 INJECTION, SOLUTION INTRAVENOUS CONTINUOUS
Status: DISCONTINUED | OUTPATIENT
Start: 2024-01-24 | End: 2024-01-25 | Stop reason: HOSPADM

## 2024-01-24 RX ORDER — MIDAZOLAM HYDROCHLORIDE 5 MG/ML
INJECTION, SOLUTION INTRAMUSCULAR; INTRAVENOUS AS NEEDED
Status: COMPLETED | OUTPATIENT
Start: 2024-01-24 | End: 2024-01-24

## 2024-01-24 RX ADMIN — SODIUM CHLORIDE, POTASSIUM CHLORIDE, SODIUM LACTATE AND CALCIUM CHLORIDE 20 ML/HR: 600; 310; 30; 20 INJECTION, SOLUTION INTRAVENOUS at 10:11

## 2024-01-24 RX ADMIN — FENTANYL CITRATE 50 MCG: 50 INJECTION, SOLUTION INTRAMUSCULAR; INTRAVENOUS at 10:41

## 2024-01-24 RX ADMIN — MIDAZOLAM HYDROCHLORIDE 2.5 MG: 5 INJECTION, SOLUTION INTRAMUSCULAR; INTRAVENOUS at 10:40

## 2024-01-24 RX ADMIN — BENZOCAINE, BUTAMBEN, AND TETRACAINE HYDROCHLORIDE 2 SPRAY: .028; .004; .004 AEROSOL, SPRAY TOPICAL at 10:38

## 2024-01-24 RX ADMIN — FENTANYL CITRATE 25 MCG: 50 INJECTION, SOLUTION INTRAMUSCULAR; INTRAVENOUS at 10:47

## 2024-01-24 ASSESSMENT — PAIN - FUNCTIONAL ASSESSMENT
PAIN_FUNCTIONAL_ASSESSMENT: 0-10

## 2024-01-24 ASSESSMENT — PAIN SCALES - GENERAL
PAINLEVEL_OUTOF10: 5 - MODERATE PAIN
PAINLEVEL_OUTOF10: 0 - NO PAIN
PAINLEVEL_OUTOF10: 5 - MODERATE PAIN
PAINLEVEL_OUTOF10: 0 - NO PAIN

## 2024-01-24 ASSESSMENT — COLUMBIA-SUICIDE SEVERITY RATING SCALE - C-SSRS
1. IN THE PAST MONTH, HAVE YOU WISHED YOU WERE DEAD OR WISHED YOU COULD GO TO SLEEP AND NOT WAKE UP?: NO
6. HAVE YOU EVER DONE ANYTHING, STARTED TO DO ANYTHING, OR PREPARED TO DO ANYTHING TO END YOUR LIFE?: NO
2. HAVE YOU ACTUALLY HAD ANY THOUGHTS OF KILLING YOURSELF?: NO

## 2024-01-24 ASSESSMENT — PAIN DESCRIPTION - DESCRIPTORS: DESCRIPTORS: DULL

## 2024-01-24 NOTE — H&P
"History Of Present Illness  Dunia Moreau \"Erin\" is a 73 y.o. female presenting with recalcitrant GERD with dysphagia presents for EGD.     Past Medical History  Past Medical History:   Diagnosis Date    Anxiety     Depression     Disease of thyroid gland     GERD (gastroesophageal reflux disease)     Hypothyroid      Surgical History  Past Surgical History:   Procedure Laterality Date    OTHER SURGICAL HISTORY  12/09/2019    Bunionectomy    OTHER SURGICAL HISTORY  12/09/2019    Hysteroscopy    OTHER SURGICAL HISTORY  12/09/2019    Loop electrosurgical excision procedure    OTHER SURGICAL HISTORY  12/09/2019    Dilation and curettage    OTHER SURGICAL HISTORY  12/09/2019    Esophagogastroduodenoscopy    OTHER SURGICAL HISTORY  12/09/2019    Tonsillectomy    OTHER SURGICAL HISTORY  09/30/2020    Hip replacement     Social History  She reports that she has never smoked. She has never used smokeless tobacco. She reports current alcohol use. She reports that she does not use drugs.    Family History  Family History   Problem Relation Name Age of Onset    Atrial fibrillation Mother      Dementia Mother      Diabetes Mother      Transient ischemic attack Mother      Uterine cancer Mother      Hypertension Mother      Thyroid disease Sister      Uterine cancer Sister      Diabetes Maternal Grandmother      Hypertension Maternal Grandmother      Prostate cancer Paternal Grandfather      Lung cancer Paternal Grandfather          Allergies  Allergies   Allergen Reactions    Amoxicillin Itching    Biotin Swelling    Penicillins Itching    Pravastatin Other     Review of Systems  Pre-sedation Evaluation:  ASA Classification - ASA 2 - Patient with mild systemic disease with no functional limitations  Mallampati Score - II (hard and soft palate, upper portion of tonsils anduvula visible)    Physical Exam  Vitals and nursing note reviewed.   Constitutional:       Appearance: Normal appearance.   HENT:      Head: " "Normocephalic.      Mouth/Throat:      Mouth: Mucous membranes are moist.      Pharynx: Oropharynx is clear.   Eyes:      Conjunctiva/sclera: Conjunctivae normal.      Pupils: Pupils are equal, round, and reactive to light.   Cardiovascular:      Pulses: Normal pulses.      Heart sounds: Normal heart sounds.   Pulmonary:      Effort: Pulmonary effort is normal.      Breath sounds: Normal breath sounds.   Abdominal:      General: Abdomen is flat. Bowel sounds are normal.      Palpations: Abdomen is soft.   Musculoskeletal:      Cervical back: Normal range of motion and neck supple.   Skin:     General: Skin is warm and dry.   Neurological:      General: No focal deficit present.      Mental Status: She is alert and oriented to person, place, and time.   Psychiatric:         Behavior: Behavior normal.          Last Recorded Vitals  Blood pressure 150/79, pulse 67, temperature 36.2 °C (97.2 °F), resp. rate 16, height 1.626 m (5' 4\"), weight 93.4 kg (206 lb), SpO2 93 %.     Assessment/Plan   Problem List Items Addressed This Visit    None  Visit Diagnoses       Dysphagia, unspecified type    -  Primary    Relevant Orders    EGD               PTA/Current Medications:  (Not in a hospital admission)    Current Outpatient Medications   Medication Sig Dispense Refill    fluticasone (Flonase) 50 mcg/actuation nasal spray Administer 2 sprays into each nostril once daily. 16 g 2    levothyroxine (Synthroid, Levoxyl) 50 mcg tablet Take 1 tablet (50 mcg) by mouth once daily. 90 tablet 3    pantoprazole (ProtoNix) 40 mg EC tablet Take 1 tablet (40 mg) by mouth once daily. Do not crush, chew, or split. 30 tablet 11    PARoxetine (Paxil) 30 mg tablet Take 1 tablet (30 mg) by mouth once daily. 90 tablet 1    albuterol 90 mcg/actuation inhaler Inhale 2 puffs 4 times a day. (Patient not taking: Reported on 1/24/2024) 18 g 2    b complex vitamins (Vitamins B Complex) capsule Take 1 capsule by mouth once daily.      CALCIUM " CITRATE-VITAMIN D3 ORAL Take 1 tablet by mouth 1 (one) time each day.      co-enzyme Q-10 30 mg capsule Take 1 capsule (30 mg) by mouth once daily.      magnesium oxide-Mg AA chelate (Magnesium, oxide/AA chelate,) 300 mg capsule Take 1 capsule (300 mg) by mouth once daily.      multivitamin tablet Take 1 tablet by mouth once daily.      omega-3 acid ethyl esters (Lovaza) 1 gram capsule Take 1 capsule (1 g) by mouth once daily.       No current facility-administered medications for this encounter.     Ede Nunez, DO

## 2024-01-24 NOTE — DISCHARGE INSTRUCTIONS

## 2024-01-29 LAB
LABORATORY COMMENT REPORT: NORMAL
PATH REPORT.FINAL DX SPEC: NORMAL
PATH REPORT.GROSS SPEC: NORMAL
PATH REPORT.TOTAL CANCER: NORMAL

## 2024-02-07 DIAGNOSIS — R74.8 ELEVATED LIVER ENZYMES: Primary | ICD-10-CM

## 2024-02-07 DIAGNOSIS — R10.11 RUQ PAIN: ICD-10-CM

## 2024-02-08 ENCOUNTER — HOSPITAL ENCOUNTER (OUTPATIENT)
Dept: RADIOLOGY | Facility: HOSPITAL | Age: 74
Discharge: HOME | End: 2024-02-08
Payer: COMMERCIAL

## 2024-02-08 DIAGNOSIS — R10.11 RUQ PAIN: ICD-10-CM

## 2024-02-08 DIAGNOSIS — R74.8 ELEVATED LIVER ENZYMES: ICD-10-CM

## 2024-02-08 PROCEDURE — 76705 ECHO EXAM OF ABDOMEN: CPT

## 2024-02-08 PROCEDURE — 76705 ECHO EXAM OF ABDOMEN: CPT | Performed by: RADIOLOGY

## 2024-02-23 ENCOUNTER — TELEPHONE (OUTPATIENT)
Dept: GASTROENTEROLOGY | Facility: CLINIC | Age: 74
End: 2024-02-23
Payer: COMMERCIAL

## 2024-02-23 DIAGNOSIS — N28.1 RENAL CYST: ICD-10-CM

## 2024-02-23 NOTE — TELEPHONE ENCOUNTER
PATIENT NOTIFIED AND VERBALIZED UNDERSTANDING Patient asked me to send referral to [facility].     ----- Message from Ede Nunez DO sent at 2/12/2024  9:13 AM EST -----  Complex right renal cyst which is unchanged measuring 7 cm with septation, this is unchanged from prior exam.    Gallbladder is unremarkable.    Please refer to urology for follow-up of renal cyst

## 2024-03-12 ENCOUNTER — APPOINTMENT (OUTPATIENT)
Dept: PRIMARY CARE | Facility: CLINIC | Age: 74
End: 2024-03-12
Payer: COMMERCIAL

## 2024-05-17 ENCOUNTER — TELEPHONE (OUTPATIENT)
Dept: PRIMARY CARE | Facility: CLINIC | Age: 74
End: 2024-05-17
Payer: COMMERCIAL

## 2024-05-17 DIAGNOSIS — E03.9 ACQUIRED HYPOTHYROIDISM: ICD-10-CM

## 2024-05-17 RX ORDER — LEVOTHYROXINE SODIUM 50 UG/1
50 TABLET ORAL DAILY
Qty: 30 TABLET | Refills: 0 | Status: SHIPPED | OUTPATIENT
Start: 2024-05-17 | End: 2024-06-16

## 2024-05-17 NOTE — TELEPHONE ENCOUNTER
RX proposed to DLR for 30 days. Called patient to tell her she needs to make an appointment. Voiced understanding and had no further questions.

## 2024-07-01 ENCOUNTER — TELEPHONE (OUTPATIENT)
Dept: PRIMARY CARE | Facility: CLINIC | Age: 74
End: 2024-07-01
Payer: COMMERCIAL

## 2024-07-01 DIAGNOSIS — E03.9 ACQUIRED HYPOTHYROIDISM: ICD-10-CM

## 2024-07-01 RX ORDER — LEVOTHYROXINE SODIUM 50 UG/1
50 TABLET ORAL DAILY
Qty: 30 TABLET | Refills: 0 | Status: SHIPPED | OUTPATIENT
Start: 2024-07-01 | End: 2024-07-31

## 2024-07-01 NOTE — TELEPHONE ENCOUNTER
Please send refill to   Pharmacy    [id number] - [place], [place] - [address] [place] [address]  [address] [facility] [address]  Phone: [phone]  Fax: [phone]     levothyroxine (Synthroid, Levoxyl) 50 mcg tablet [[id number]]   Appointment on 7/11/24, will be out of pills tomorrow

## 2024-07-03 ENCOUNTER — TELEPHONE (OUTPATIENT)
Dept: PRIMARY CARE | Facility: CLINIC | Age: 74
End: 2024-07-03
Payer: COMMERCIAL

## 2024-07-03 NOTE — TELEPHONE ENCOUNTER
Patient would like to know if you want fasting blood work before next appointment. Nothing is listed under labs

## 2024-07-08 DIAGNOSIS — R03.0 ELEVATED BLOOD PRESSURE READING: ICD-10-CM

## 2024-07-08 DIAGNOSIS — R74.8 ELEVATED LIVER ENZYMES: ICD-10-CM

## 2024-07-08 DIAGNOSIS — E03.9 HYPOTHYROIDISM, UNSPECIFIED TYPE: Primary | ICD-10-CM

## 2024-07-08 DIAGNOSIS — E78.2 MIXED HYPERLIPIDEMIA: ICD-10-CM

## 2024-07-11 ENCOUNTER — APPOINTMENT (OUTPATIENT)
Dept: PRIMARY CARE | Facility: CLINIC | Age: 74
End: 2024-07-11
Payer: COMMERCIAL

## 2024-07-11 ENCOUNTER — LAB (OUTPATIENT)
Dept: LAB | Facility: LAB | Age: 74
End: 2024-07-11
Payer: COMMERCIAL

## 2024-07-11 VITALS
OXYGEN SATURATION: 95 % | DIASTOLIC BLOOD PRESSURE: 70 MMHG | SYSTOLIC BLOOD PRESSURE: 128 MMHG | BODY MASS INDEX: 36.37 KG/M2 | HEART RATE: 64 BPM | HEIGHT: 64 IN | WEIGHT: 213 LBS

## 2024-07-11 DIAGNOSIS — R03.0 ELEVATED BLOOD PRESSURE READING: ICD-10-CM

## 2024-07-11 DIAGNOSIS — R32 URINARY INCONTINENCE, UNSPECIFIED TYPE: ICD-10-CM

## 2024-07-11 DIAGNOSIS — N32.81 OVERACTIVE BLADDER: ICD-10-CM

## 2024-07-11 DIAGNOSIS — K21.9 GASTROESOPHAGEAL REFLUX DISEASE WITHOUT ESOPHAGITIS: ICD-10-CM

## 2024-07-11 DIAGNOSIS — E78.2 MIXED HYPERLIPIDEMIA: ICD-10-CM

## 2024-07-11 DIAGNOSIS — R73.9 ELEVATED BLOOD SUGAR: ICD-10-CM

## 2024-07-11 DIAGNOSIS — Z78.0 POSTMENOPAUSAL: ICD-10-CM

## 2024-07-11 DIAGNOSIS — Z12.31 SCREENING MAMMOGRAM FOR BREAST CANCER: ICD-10-CM

## 2024-07-11 DIAGNOSIS — E03.9 ACQUIRED HYPOTHYROIDISM: ICD-10-CM

## 2024-07-11 DIAGNOSIS — E03.9 HYPOTHYROIDISM, UNSPECIFIED TYPE: ICD-10-CM

## 2024-07-11 DIAGNOSIS — Z00.00 ROUTINE GENERAL MEDICAL EXAMINATION AT HEALTH CARE FACILITY: Primary | ICD-10-CM

## 2024-07-11 DIAGNOSIS — M85.80 OSTEOPENIA, UNSPECIFIED LOCATION: ICD-10-CM

## 2024-07-11 DIAGNOSIS — J01.40 ACUTE NON-RECURRENT PANSINUSITIS: ICD-10-CM

## 2024-07-11 DIAGNOSIS — R74.8 ELEVATED LIVER ENZYMES: ICD-10-CM

## 2024-07-11 DIAGNOSIS — F41.8 DEPRESSION WITH ANXIETY: ICD-10-CM

## 2024-07-11 LAB
ALBUMIN SERPL BCP-MCNC: 3.8 G/DL (ref 3.4–5)
ALP SERPL-CCNC: 108 U/L (ref 33–136)
ALT SERPL W P-5'-P-CCNC: 26 U/L (ref 7–45)
ANION GAP SERPL CALC-SCNC: 11 MMOL/L (ref 10–20)
AST SERPL W P-5'-P-CCNC: 24 U/L (ref 9–39)
BILIRUB SERPL-MCNC: 1.2 MG/DL (ref 0–1.2)
BUN SERPL-MCNC: 19 MG/DL (ref 6–23)
CALCIUM SERPL-MCNC: 9.4 MG/DL (ref 8.6–10.3)
CHLORIDE SERPL-SCNC: 105 MMOL/L (ref 98–107)
CHOLEST SERPL-MCNC: 267 MG/DL (ref 0–199)
CHOLESTEROL/HDL RATIO: 4
CO2 SERPL-SCNC: 30 MMOL/L (ref 21–32)
CREAT SERPL-MCNC: 0.88 MG/DL (ref 0.5–1.05)
EGFRCR SERPLBLD CKD-EPI 2021: 69 ML/MIN/1.73M*2
ERYTHROCYTE [DISTWIDTH] IN BLOOD BY AUTOMATED COUNT: 14 % (ref 11.5–14.5)
GLUCOSE SERPL-MCNC: 122 MG/DL (ref 74–99)
HCT VFR BLD AUTO: 43.5 % (ref 36–46)
HDLC SERPL-MCNC: 67 MG/DL
HGB BLD-MCNC: 13.2 G/DL (ref 12–16)
LDLC SERPL CALC-MCNC: 173 MG/DL
MCH RBC QN AUTO: 28.9 PG (ref 26–34)
MCHC RBC AUTO-ENTMCNC: 30.3 G/DL (ref 32–36)
MCV RBC AUTO: 95 FL (ref 80–100)
NON HDL CHOLESTEROL: 200 MG/DL (ref 0–149)
NRBC BLD-RTO: 0 /100 WBCS (ref 0–0)
PLATELET # BLD AUTO: 281 X10*3/UL (ref 150–450)
POTASSIUM SERPL-SCNC: 4.5 MMOL/L (ref 3.5–5.3)
PROT SERPL-MCNC: 6 G/DL (ref 6.4–8.2)
RBC # BLD AUTO: 4.57 X10*6/UL (ref 4–5.2)
SODIUM SERPL-SCNC: 141 MMOL/L (ref 136–145)
T4 FREE SERPL-MCNC: 1.03 NG/DL (ref 0.61–1.12)
TRIGL SERPL-MCNC: 136 MG/DL (ref 0–149)
TSH SERPL-ACNC: 5.41 MIU/L (ref 0.44–3.98)
VLDL: 27 MG/DL (ref 0–40)
WBC # BLD AUTO: 5.5 X10*3/UL (ref 4.4–11.3)

## 2024-07-11 PROCEDURE — 80061 LIPID PANEL: CPT

## 2024-07-11 PROCEDURE — 1036F TOBACCO NON-USER: CPT

## 2024-07-11 PROCEDURE — 36415 COLL VENOUS BLD VENIPUNCTURE: CPT

## 2024-07-11 PROCEDURE — 1124F ACP DISCUSS-NO DSCNMKR DOCD: CPT

## 2024-07-11 PROCEDURE — 84439 ASSAY OF FREE THYROXINE: CPT

## 2024-07-11 PROCEDURE — 80053 COMPREHEN METABOLIC PANEL: CPT

## 2024-07-11 PROCEDURE — 1159F MED LIST DOCD IN RCRD: CPT

## 2024-07-11 PROCEDURE — 99214 OFFICE O/P EST MOD 30 MIN: CPT

## 2024-07-11 PROCEDURE — 1157F ADVNC CARE PLAN IN RCRD: CPT

## 2024-07-11 PROCEDURE — 85027 COMPLETE CBC AUTOMATED: CPT

## 2024-07-11 PROCEDURE — G0439 PPPS, SUBSEQ VISIT: HCPCS

## 2024-07-11 PROCEDURE — 84443 ASSAY THYROID STIM HORMONE: CPT

## 2024-07-11 PROCEDURE — 1170F FXNL STATUS ASSESSED: CPT

## 2024-07-11 RX ORDER — PAROXETINE 30 MG/1
30 TABLET, FILM COATED ORAL DAILY
Qty: 90 TABLET | Refills: 3 | Status: SHIPPED | OUTPATIENT
Start: 2024-07-11 | End: 2025-07-11

## 2024-07-11 RX ORDER — LEVOTHYROXINE SODIUM 50 UG/1
50 TABLET ORAL DAILY
Qty: 90 TABLET | Refills: 3 | Status: SHIPPED | OUTPATIENT
Start: 2024-07-11 | End: 2025-07-11

## 2024-07-11 RX ORDER — FLUTICASONE PROPIONATE 50 MCG
2 SPRAY, SUSPENSION (ML) NASAL DAILY
Qty: 16 G | Refills: 2 | Status: SHIPPED | OUTPATIENT
Start: 2024-07-11

## 2024-07-11 ASSESSMENT — PATIENT HEALTH QUESTIONNAIRE - PHQ9
1. LITTLE INTEREST OR PLEASURE IN DOING THINGS: NOT AT ALL
2. FEELING DOWN, DEPRESSED OR HOPELESS: NOT AT ALL
SUM OF ALL RESPONSES TO PHQ9 QUESTIONS 1 AND 2: 0

## 2024-07-11 ASSESSMENT — ACTIVITIES OF DAILY LIVING (ADL)
BATHING: INDEPENDENT
TAKING_MEDICATION: INDEPENDENT
GROCERY_SHOPPING: INDEPENDENT
DRESSING: INDEPENDENT
MANAGING_FINANCES: INDEPENDENT
DOING_HOUSEWORK: INDEPENDENT

## 2024-07-11 ASSESSMENT — ENCOUNTER SYMPTOMS
RESPIRATORY NEGATIVE: 1
CARDIOVASCULAR NEGATIVE: 1
CONSTITUTIONAL NEGATIVE: 1
GASTROINTESTINAL NEGATIVE: 1

## 2024-07-11 NOTE — PROGRESS NOTES
"Subjective   Patient ID: Erin Moreau is a 74 y.o. female who presents for Medicare Annual Wellness Visit Subsequent (Med check , review labs , would like PT order for incontinence ).    HPI     Review of Systems    Objective   /70   Pulse 64   Ht 1.626 m (5' 4\")   Wt 96.6 kg (213 lb)   SpO2 95%   BMI 36.56 kg/m²     Physical Exam    Assessment/Plan          "

## 2024-07-11 NOTE — PROGRESS NOTES
"Subjective   Reason for Visit: Dunia Moreau is an 74 y.o. female here for a Medicare Wellness visit.     Past Medical, Surgical, and Family History reviewed and updated in chart.    Reviewed all medications by prescribing practitioner or clinical pharmacist (such as prescriptions, OTCs, herbal therapies and supplements) and documented in the medical record.    HPI  HYPOTHYROID: Compliant with levo 50mcg daily, no SE.  ALLERGIES: Stable on Flonase.  ANXIETY/DEPRESSION: Stable on Paxil, no SE.  GERD: Stable on Protonix, no SE.  INCONTINENCE: Urinary, urgency, frequency. No blood in urine. Would not like medication at this time.  HYPERLIPIDEMIA: SE to many statins in the past.    Mammo due now.  Colonoscopy 2023, due 2033.  DEXA 2022 showed osteopenia, taking vit D/calcium daily.    Patient Care Team:  Lucas Esquivel PA-C as PCP - General (Family Medicine)  Solomon Jama MD as PCP - [employer] PCP  Angel Burton MD as PCP - [employer]vantage PCP     Review of Systems   Constitutional: Negative.    Respiratory: Negative.     Cardiovascular: Negative.    Gastrointestinal: Negative.        Objective   Vitals:  /70   Pulse 64   Ht 1.626 m (5' 4\")   Wt 96.6 kg (213 lb)   SpO2 95%   BMI 36.56 kg/m²       Physical Exam  Constitutional:       General: She is not in acute distress.     Appearance: Normal appearance. She is not ill-appearing.   HENT:      Head: Normocephalic and atraumatic.   Eyes:      Extraocular Movements: Extraocular movements intact.      Conjunctiva/sclera: Conjunctivae normal.   Cardiovascular:      Rate and Rhythm: Normal rate.   Pulmonary:      Effort: Pulmonary effort is normal.   Abdominal:      General: There is no distension.   Musculoskeletal:         General: Normal range of motion.      Cervical back: Normal range of motion.   Skin:     General: Skin is warm and dry.   Neurological:      General: No focal deficit present.      Mental Status: " She is alert and oriented to person, place, and time.   Psychiatric:         Mood and Affect: Mood normal.         Behavior: Behavior normal.         Thought Content: Thought content normal.         Judgment: Judgment normal.         Assessment/Plan   Problem List Items Addressed This Visit       Acute sinusitis    Depression with anxiety    Hypothyroidism        No medication changes today.  Will monitor TSH and lipids, check A1C before follow up.  She would like to hold on trial of another statin at this time.  Mammo ordered.  DEXA ordered.  Referral to PT for pelvic floor therapy.  Follow up 6 months or sooner prn.

## 2024-08-16 ENCOUNTER — HOSPITAL ENCOUNTER (OUTPATIENT)
Dept: RADIOLOGY | Facility: CLINIC | Age: 74
Discharge: HOME | End: 2024-08-16
Payer: COMMERCIAL

## 2024-08-16 VITALS — BODY MASS INDEX: 36.37 KG/M2 | HEIGHT: 64 IN | WEIGHT: 213 LBS

## 2024-08-16 DIAGNOSIS — Z12.31 SCREENING MAMMOGRAM FOR BREAST CANCER: ICD-10-CM

## 2024-08-16 DIAGNOSIS — Z78.0 POSTMENOPAUSAL: ICD-10-CM

## 2024-08-16 PROCEDURE — 77080 DXA BONE DENSITY AXIAL: CPT

## 2024-08-16 PROCEDURE — 77067 SCR MAMMO BI INCL CAD: CPT

## 2024-08-16 ASSESSMENT — LIFESTYLE VARIABLES
3_OR_MORE_DRINKS_PER_DAY: N
CURRENT_SMOKER: N

## 2024-10-03 ENCOUNTER — HOSPITAL ENCOUNTER (OUTPATIENT)
Dept: RADIOLOGY | Facility: CLINIC | Age: 74
Discharge: HOME | End: 2024-10-03
Payer: COMMERCIAL

## 2024-10-03 ENCOUNTER — TELEPHONE (OUTPATIENT)
Dept: PRIMARY CARE | Facility: CLINIC | Age: 74
End: 2024-10-03
Payer: COMMERCIAL
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
## 2024-10-03 DIAGNOSIS — M25.522 LEFT ELBOW PAIN: Primary | ICD-10-CM
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
## 2024-10-03 PROCEDURE — 73030 X-RAY EXAM OF SHOULDER: CPT | Mod: LT

## 2024-10-03 RX ORDER — OXYCODONE AND ACETAMINOPHEN 5; 325 MG/1; MG/1
1 TABLET ORAL EVERY 6 HOURS PRN
Qty: 20 TABLET | Refills: 0 | Status: SHIPPED | OUTPATIENT
Start: 2024-10-03 | End: 2024-10-08

## 2024-10-03 NOTE — TELEPHONE ENCOUNTER
Patient fell yesterday and experienced many broken bones. ER meds are not touching the pain, would also like more x-rays. Patient can not be seen until tomorrow. Is there any way she can be seen today? Please advise

## 2024-10-04 ENCOUNTER — OFFICE VISIT (OUTPATIENT)
Dept: ORTHOPEDIC SURGERY | Facility: CLINIC | Age: 74
End: 2024-10-04
Payer: COMMERCIAL

## 2024-10-04 ENCOUNTER — HOSPITAL ENCOUNTER (OUTPATIENT)
Dept: RADIOLOGY | Facility: EXTERNAL LOCATION | Age: 74
Discharge: HOME | End: 2024-10-04

## 2024-10-04 ENCOUNTER — APPOINTMENT (OUTPATIENT)
Dept: PRIMARY CARE | Facility: CLINIC | Age: 74
End: 2024-10-04
Payer: COMMERCIAL

## 2024-10-04 DIAGNOSIS — S62.102A LEFT WRIST FRACTURE, CLOSED, INITIAL ENCOUNTER: ICD-10-CM

## 2024-10-04 PROCEDURE — 1159F MED LIST DOCD IN RCRD: CPT | Performed by: SPECIALIST

## 2024-10-04 PROCEDURE — 99204 OFFICE O/P NEW MOD 45 MIN: CPT | Performed by: SPECIALIST

## 2024-10-04 PROCEDURE — 1125F AMNT PAIN NOTED PAIN PRSNT: CPT | Performed by: SPECIALIST

## 2024-10-04 PROCEDURE — 1157F ADVNC CARE PLAN IN RCRD: CPT | Performed by: SPECIALIST

## 2024-10-04 PROCEDURE — L3670 SO ACRO/CLAV CAN WEB PRE OTS: HCPCS | Performed by: SPECIALIST

## 2024-10-04 PROCEDURE — 1160F RVW MEDS BY RX/DR IN RCRD: CPT | Performed by: SPECIALIST

## 2024-10-04 ASSESSMENT — PAIN DESCRIPTION - DESCRIPTORS
DESCRIPTORS: SHOOTING
DESCRIPTORS: SHOOTING

## 2024-10-04 ASSESSMENT — PAIN SCALES - GENERAL
PAINLEVEL_OUTOF10: 6
PAINLEVEL_OUTOF10: 7

## 2024-10-04 ASSESSMENT — PAIN - FUNCTIONAL ASSESSMENT: PAIN_FUNCTIONAL_ASSESSMENT: 0-10

## 2024-10-04 NOTE — ASSESSMENT & PLAN NOTE
Assessment: 2 days status post a left elbow dislocation which was reduced in the urgent care.  She also has a left distal radius fracture which is minimally displaced.  There is a history of a ulnar shaft fracture which is well-healed.  She is complaining of some left knee pain from having contused it.  She is also complaining of some left shoulder pain and an x-ray was obtained which did not show an acute fracture.  There is some suspicion of rotator cuff tear and superior migration of the left shoulder.    Plan:  Follow-up in 1 week for reevaluation with new x-rays of the left shoulder, left elbow, left wrist, and left knee.  This should be taken in the posterior splint.  The best views possible should be obtained.  I need a good AP of the distal humerus and a good lateral of the distal humerus.  I agree padded and rewrapped her posterior splint and checked her skin.  She moves her elbow flexion extension in a way that suggest she has a topical reduction of the elbow.  I have my fingers on the epicondyles and there was no crepitation.  She has pain medications at home.  I gave her a work excuse for at least 8 weeks.

## 2024-10-07 DIAGNOSIS — S62.102A LEFT WRIST FRACTURE, CLOSED, INITIAL ENCOUNTER: ICD-10-CM

## 2024-10-07 DIAGNOSIS — G89.29 CHRONIC PAIN OF LEFT KNEE: ICD-10-CM

## 2024-10-07 DIAGNOSIS — M25.562 CHRONIC PAIN OF LEFT KNEE: ICD-10-CM

## 2024-10-07 NOTE — PROGRESS NOTES
"Assessment/Plan   Encounter Diagnoses:  Left wrist fracture, closed, initial encounter  Left wrist fracture, closed, initial encounter  Assessment: 2 days status post a left elbow dislocation which was reduced in the urgent care.  She also has a left distal radius fracture which is minimally displaced.  There is a history of a ulnar shaft fracture which is well-healed.  She is complaining of some left knee pain from having contused it.  She is also complaining of some left shoulder pain and an x-ray was obtained which did not show an acute fracture.  There is some suspicion of rotator cuff tear and superior migration of the left shoulder.    Plan:  Follow-up in 1 week for reevaluation with new x-rays of the left shoulder, left elbow, left wrist, and left knee.  This should be taken in the posterior splint.  The best views possible should be obtained.  I need a good AP of the distal humerus and a good lateral of the distal humerus.  I agree padded and rewrapped her posterior splint and checked her skin.  She moves her elbow flexion extension in a way that suggest she has a topical reduction of the elbow.  I have my fingers on the epicondyles and there was no crepitation.  She has pain medications at home.  I gave her a work excuse for at least 8 weeks.   Will maintain the posterior splint including the wrist.    Subjective    Patient ID: Dunia Moreau \"Izzy" is a 74 y.o. female.    Chief Complaint: Pain of the Left Elbow (FRACTURE /X-RAYS 10-2-24) and Pain of the Left Wrist (FRACTURE /X-RAYS 10-2-24)     Last Surgery: No surgery found  Last Surgery Date: No surgery found    HPI  74-year-old whose 2 days status post a dislocation of her left elbow.  She had a coronoid fracture.  She may have had a nondisplaced distal radius fracture on the left side as well.  She comes in today wearing a sling and a posterior splint.  She is neurovascularly intact distally.  She feels her elbow is reduced properly.    OBJECTIVE: " ORTHO EXAM  Left elbow exam  Swelling and tenderness to palpation of the medial and lateral epicondyles is noted.  She had a good flexion extension of the elbow going from 45 degrees extension to 90 degrees of flexion.  There was no crepitation.  There was no clinical indication that the elbow was anything but anatomically reduced.  She had 45 degrees of supination and 45 degrees of pronation with minimal pain.  She is neurovascularly intact distally.    Left wrist exam  She was tender to palpation over the distal radius.  Gentle flexion extension at the wrist revealed 20 degrees of dorsiflexion and 20 degrees of palmar flexion with minimal pain.  As above she had 45 degrees of supination and pronation.    IMAGE RESULTS:  XR shoulder left 2+ views  Narrative: Interpreted By:  Miguelina Mehta,   STUDY:  Left shoulder, 3 views.      INDICATION:  Signs/Symptoms:fall with shoulder pain.      COMPARISON:  None.      ACCESSION NUMBER(S):  [id number]      ORDERING CLINICIAN:  BREANNA VALIENTE      FINDINGS:  No acute fracture or malalignment.  Mild acromioclavicular and glenohumeral joint osteoarthrosis with  osteophytes. Glenohumeral joint space is maintained.  Soft tissues are unremarkable.      Impression: 1. Mild acromioclavicular and glenohumeral joint osteoarthrosis.  Otherwise, unremarkable left shoulder radiographs.      MACRO:      None.      Signed by: Miguelina Mehta 10/4/2024 6:05 PM  Dictation workstation:   [id number]  Point of Care Ultrasound  These images are not reportable by radiology and will not be interpreted   by  Radiologists.    X-rays from the urgent care were not available for me to visualize but the readings were available.  They showed the sequelae of a elbow dislocation with coronoid fracture.  Anatomical reduction was felt to be achieved.  A nondisplaced fracture of the distal radius was also reported.  There is some question of a metaphyseal fracture of the distal  humerus.    ULTRASOUND  None    Procedures     Orders Placed This Encounter    Point of Care Ultrasound

## 2024-10-11 ENCOUNTER — HOSPITAL ENCOUNTER (OUTPATIENT)
Dept: RADIOLOGY | Facility: CLINIC | Age: 74
Discharge: HOME | End: 2024-10-11
Payer: COMMERCIAL

## 2024-10-11 ENCOUNTER — APPOINTMENT (OUTPATIENT)
Dept: ORTHOPEDIC SURGERY | Facility: CLINIC | Age: 74
End: 2024-10-11
Payer: COMMERCIAL

## 2024-10-11 ENCOUNTER — HOSPITAL ENCOUNTER (OUTPATIENT)
Dept: RADIOLOGY | Facility: EXTERNAL LOCATION | Age: 74
Discharge: HOME | End: 2024-10-11

## 2024-10-11 VITALS — BODY MASS INDEX: 36.7 KG/M2 | WEIGHT: 213.8 LBS

## 2024-10-11 DIAGNOSIS — S53.105D ELBOW DISLOCATION, LEFT, SUBSEQUENT ENCOUNTER: Primary | ICD-10-CM

## 2024-10-11 DIAGNOSIS — S62.102A LEFT WRIST FRACTURE, CLOSED, INITIAL ENCOUNTER: ICD-10-CM

## 2024-10-11 DIAGNOSIS — G89.29 CHRONIC PAIN OF LEFT KNEE: ICD-10-CM

## 2024-10-11 DIAGNOSIS — M25.562 ACUTE PAIN OF LEFT KNEE: ICD-10-CM

## 2024-10-11 DIAGNOSIS — M25.562 CHRONIC PAIN OF LEFT KNEE: ICD-10-CM

## 2024-10-11 PROCEDURE — 1157F ADVNC CARE PLAN IN RCRD: CPT | Performed by: SPECIALIST

## 2024-10-11 PROCEDURE — 99214 OFFICE O/P EST MOD 30 MIN: CPT | Performed by: SPECIALIST

## 2024-10-11 PROCEDURE — 73080 X-RAY EXAM OF ELBOW: CPT | Mod: LT

## 2024-10-11 PROCEDURE — 73110 X-RAY EXAM OF WRIST: CPT | Mod: LT

## 2024-10-11 PROCEDURE — 73030 X-RAY EXAM OF SHOULDER: CPT | Mod: LT

## 2024-10-11 PROCEDURE — 1160F RVW MEDS BY RX/DR IN RCRD: CPT | Performed by: SPECIALIST

## 2024-10-11 PROCEDURE — 1159F MED LIST DOCD IN RCRD: CPT | Performed by: SPECIALIST

## 2024-10-11 PROCEDURE — 1125F AMNT PAIN NOTED PAIN PRSNT: CPT | Performed by: SPECIALIST

## 2024-10-11 PROCEDURE — 1036F TOBACCO NON-USER: CPT | Performed by: SPECIALIST

## 2024-10-11 PROCEDURE — 73564 X-RAY EXAM KNEE 4 OR MORE: CPT | Mod: LT

## 2024-10-11 ASSESSMENT — PAIN SCALES - GENERAL
PAINLEVEL_OUTOF10: 7
PAINLEVEL_OUTOF10: 6
PAINLEVEL_OUTOF10: 7

## 2024-10-11 ASSESSMENT — PAIN - FUNCTIONAL ASSESSMENT: PAIN_FUNCTIONAL_ASSESSMENT: 0-10

## 2024-10-11 ASSESSMENT — PAIN DESCRIPTION - DESCRIPTORS
DESCRIPTORS: ACHING
DESCRIPTORS: ACHING

## 2024-10-11 NOTE — ASSESSMENT & PLAN NOTE
Assessment: Left elbow dislocation with coronoid tip fracture.  Left distal radius fracture.  Contusion left shoulder.  Contusion left knee.  Aggravation of medial arthritis of the left knee.    Plan:  Posterior splint well-padded immobilizing the elbow dislocation.  Splint of the left wrist fracture.  Post sling.  She was offered but has enough pain medication.  Follow-up in 2 weeks for reevaluation.  X-rays of the left elbow forearm and wrist should be obtained.  If the pain in her left shoulder or left knee persists or worsens x-rays of these areas should be repeated.    An elbow hinged brace should be available for the size patient at her next visit.  Left wrist splint should be obtained for this size patient.

## 2024-10-11 NOTE — PROGRESS NOTES
"Assessment/Plan   Encounter Diagnoses:  Elbow dislocation, left, subsequent encounter    Left wrist fracture, closed, initial encounter    Acute pain of left knee  Elbow dislocation, left, subsequent encounter  Assessment: Left elbow dislocation with coronoid tip fracture.  Left distal radius fracture.  Contusion left shoulder.  Contusion left knee.  Aggravation of medial arthritis of the left knee.    Plan:  Posterior splint well-padded immobilizing the elbow dislocation.  Splint of the left wrist fracture.  Post sling.  She was offered but has enough pain medication.  Follow-up in 2 weeks for reevaluation.  X-rays of the left elbow forearm and wrist should be obtained.  If the pain in her left shoulder or left knee persists or worsens x-rays of these areas should be repeated.    An elbow hinged brace should be available for the size patient at her next visit.  Left wrist splint should be obtained for this size patient.       Subjective    Patient ID: Dunia Moreau \"Izzy" is a 74 y.o. female.    Chief Complaint: Pain of the Left Wrist, Pain of the Left Elbow, Pain of the Left Shoulder, and Pain of the Left Knee     Last Surgery: No surgery found  Last Surgery Date: No surgery found    HPI  74-year-old who is status status post a 10/2/2020 for fall and left elbow dislocation and left distal radial fracture.  She comes in today for recheck.  She was also complaining of shoulder pain and left knee pain and these will be addressed today as well.    OBJECTIVE: ORTHO EXAM  Left elbow  The bruising is noted to be resolving.  The swelling is diminishing.  She has extension to 45 degrees and flexion to past 90 degrees with a smooth range of motion.  She is tender to palpation globally.  She is neurovascularly intact distally.    Left wrist  She has bruising which is resolving.  The swelling is resolving.  She has normal sensation in her fingers except for the middle finger.  She has decreased sensation in the fingertip " past the DIP joint and into the tip of the finger.  This is moderate.  She does have good protective sensation.  She is tender to palpation.  She has dorsiflexion to about 10 degrees and palmar flexion to 15 degrees.    Left shoulder  There is no bruising.  There is no significant swelling.  Gentle internal and external rotation were nontender.  The shoulder is anatomically reduced and maintained.  The exam was limited by her other upper extremity injuries on the ipsilateral side.    Left knee exam  The exam was limited by her clothing which could not be brought above the knee.  She was tender along the medial joint line.  I was able to visualize that there was no bruising or swelling about the knee.  She had a no- scant effusion  Her knee was stable to varus and valgus although she is in some varus and has some valgus laxity  She demonstrated full extension and flexion to 90 degrees  Thigh is supple and nontender  Calf is supple and nontender bilaterally  He is neurovascularly intact distally    IMAGE RESULTS:  Point of Care Ultrasound  These images are not reportable by radiology and will not be interpreted   by  Radiologists.  X-rays of the left shoulder were within normal limits.  No acute fracture or dislocation is seen.  X-ray of the left elbow shows it to be reduced.  Some fragmentation is noted at the tip of the coronoid.  X-ray of the left wrist shows an impacted distal radius fracture which is minimally displaced and in a good position for closed treatment.  X-ray of the left knee shows medial joint arthritis but no acute fracture or dislocation is seen.      ULTRASOUND      Procedures     Orders Placed This Encounter    Point of Care Ultrasound    XR shoulder left 2+ views    XR wrist left 3+ views    XR wrist left 3+ views    XR knee left 4+ views    XR elbow left 3+ views

## 2024-10-25 ENCOUNTER — HOSPITAL ENCOUNTER (OUTPATIENT)
Dept: RADIOLOGY | Facility: CLINIC | Age: 74
Discharge: HOME | End: 2024-10-25
Payer: COMMERCIAL

## 2024-10-25 ENCOUNTER — HOSPITAL ENCOUNTER (OUTPATIENT)
Dept: RADIOLOGY | Facility: EXTERNAL LOCATION | Age: 74
Discharge: HOME | End: 2024-10-25

## 2024-10-25 ENCOUNTER — APPOINTMENT (OUTPATIENT)
Dept: ORTHOPEDIC SURGERY | Facility: CLINIC | Age: 74
End: 2024-10-25
Payer: COMMERCIAL

## 2024-10-25 DIAGNOSIS — S53.105D ELBOW DISLOCATION, LEFT, SUBSEQUENT ENCOUNTER: ICD-10-CM

## 2024-10-25 DIAGNOSIS — S62.102A LEFT WRIST FRACTURE, CLOSED, INITIAL ENCOUNTER: ICD-10-CM

## 2024-10-25 PROCEDURE — 73110 X-RAY EXAM OF WRIST: CPT | Mod: LT

## 2024-10-25 NOTE — ASSESSMENT & PLAN NOTE
Assessment: 23 days status post a left elbow dislocation which was reduced in the urgent care on 10/2/24.  She also has a left distal radius fracture which is minimally displaced.  There is a history of a ulnar shaft fracture which is well-healed.  She is complaining of some left knee pain from having contused it.  She is also complaining of some left shoulder pain and an x-ray was obtained which did not show an acute fracture.  There is some suspicion of rotator cuff tear and superior migration of the left shoulder.    Plan:  Follow-up in 3-4 week for reevaluation with new x-rays of the left shoulder, left elbow, left wrist, and left knee.

## 2024-10-25 NOTE — ASSESSMENT & PLAN NOTE
Assessment: Left elbow dislocation now over 3 weeks status post 10/2/2024 date of injury.  Left wrist minimally displaced but intra-articular fracture with greater than 3 fragments from the 10/2/2024 injury.    Plan:  An elbow hinged brace was applied today.  This is locked at 70 degrees of extension and full flexion.  Wrist brace Velcro cock up was applied.  Occupational Therapy will begin to range the elbow and wrist with gentle active motion.  They can adjust the elbow brace down to 50 degrees sequentially as she regains her motion.  Follow-up in 3 weeks for reevaluation.

## 2024-10-25 NOTE — PROGRESS NOTES
"Assessment/Plan   Encounter Diagnoses:  Left wrist fracture, closed, initial encounter    Elbow dislocation, left, subsequent encounter  Left wrist fracture, closed, initial encounter  Assessment: 23 days status post a left elbow dislocation which was reduced in the urgent care on 10/2/24.  She also has a left distal radius fracture which is minimally displaced.  There is a history of a ulnar shaft fracture which is well-healed.  She is complaining of some left knee pain from having contused it.  She is also complaining of some left shoulder pain and an x-ray was obtained which did not show an acute fracture.  There is some suspicion of rotator cuff tear and superior migration of the left shoulder.    Plan:  Follow-up in 3-4 week for reevaluation with new x-rays of the left shoulder, left elbow, left wrist, and left knee.     Elbow dislocation, left, subsequent encounter  Assessment: Left elbow dislocation now over 3 weeks status post 10/2/2024 date of injury.  Left wrist minimally displaced but intra-articular fracture with greater than 3 fragments from the 10/2/2024 injury.    Plan:  An elbow hinged brace was applied today.  This is locked at 70 degrees of extension and full flexion.  Wrist brace Velcro cock up was applied.  Occupational Therapy will begin to range the elbow and wrist with gentle active motion.  They can adjust the elbow brace down to 50 degrees sequentially as she regains her motion.  Follow-up in 3 weeks for reevaluation.       Subjective    Patient ID: Dunia Moreau \"Erin\" is a 74 y.o. female.    Chief Complaint: Pain of the Left Wrist (PAIN RATE 3 /X-RAYS TODAY/DOI 10-2-24), Pain of the Left Elbow (PAIN RATE 2/FEELS LIKE CATCHES/X-RAYS TODAY/DOI 10-2-24), and Pain of the Left Shoulder (PAIN RATE 4/X-RAYS TODAY/DOI 10-2-24)     Last Surgery: No surgery found  Last Surgery Date: No surgery found    HPI  74-year-old who is 3 weeks status post 10/2/2024 elbow dislocation and left distal " radius fracture.    OBJECTIVE: ORTHO EXAM  Left elbow exam  Swelling is reduced.  The bruising is resolving.  She demonstrates flexion to 130 degrees.  With some guarding she got to extension of 40 degrees short of full.  She demonstrates full pronation and 45 degrees of supination.    Left wrist  Bruising is resolving.  Swelling is reduced.  Neurovascularly intact distally.  She has dorsiflexion to 15 degrees and palmar flexion to 20.  Full pronation and supination to about 45 degrees.  Tender to palpation over the distal radius but this is improved from previous evaluations.    IMAGE RESULTS:  Point of Care Ultrasound  These images are not reportable by radiology and will not be interpreted   by  Radiologists.      ULTRASOUND  none    Procedures     Orders Placed This Encounter    Point of Care Ultrasound    Referral to Occupational Therapy

## 2024-11-06 ENCOUNTER — EVALUATION (OUTPATIENT)
Dept: OCCUPATIONAL THERAPY | Facility: CLINIC | Age: 74
End: 2024-11-06
Payer: COMMERCIAL

## 2024-11-06 DIAGNOSIS — S53.105D ELBOW DISLOCATION, LEFT, SUBSEQUENT ENCOUNTER: ICD-10-CM

## 2024-11-06 DIAGNOSIS — M25.532 PAIN, WRIST, LEFT: ICD-10-CM

## 2024-11-06 DIAGNOSIS — M25.632 STIFFNESS OF LEFT WRIST JOINT: ICD-10-CM

## 2024-11-06 DIAGNOSIS — S62.102A LEFT WRIST FRACTURE, CLOSED, INITIAL ENCOUNTER: Primary | ICD-10-CM

## 2024-11-06 DIAGNOSIS — M25.642 STIFFNESS OF FINGER JOINT OF LEFT HAND: ICD-10-CM

## 2024-11-06 PROCEDURE — 97165 OT EVAL LOW COMPLEX 30 MIN: CPT | Mod: GO

## 2024-11-06 ASSESSMENT — ENCOUNTER SYMPTOMS
DEPRESSION: 0
OCCASIONAL FEELINGS OF UNSTEADINESS: 0
LOSS OF SENSATION IN FEET: 0

## 2024-11-06 ASSESSMENT — PAIN - FUNCTIONAL ASSESSMENT: PAIN_FUNCTIONAL_ASSESSMENT: 0-10

## 2024-11-06 ASSESSMENT — PAIN DESCRIPTION - DESCRIPTORS: DESCRIPTORS: ACHING

## 2024-11-06 ASSESSMENT — ACTIVITIES OF DAILY LIVING (ADL): BATHING_ASSISTANCE: INDEPENDENT

## 2024-11-06 ASSESSMENT — PAIN SCALES - GENERAL: PAINLEVEL_OUTOF10: 4

## 2024-11-06 NOTE — PROGRESS NOTES
"Occupational Therapy    Evaluation/Treatment    Patient Name: Dunia Moreau \"Erin\"  MRN: [id number]  : 1950  Today's Date: 24     Time Calculation  Start Time: 1615  Stop Time: 1700  Time Calculation (min): 45 min  Therapeutic Procedure Codes:  OT Evaluation Time Entry  OT Evaluation (Low) Time Entry: 45                             Subjective   Current Problem:  1. Left wrist fracture, closed, initial encounter  Referral to Occupational Therapy    Follow Up In Occupational Therapy      2. Elbow dislocation, left, subsequent encounter  Referral to Occupational Therapy    Follow Up In Occupational Therapy      3. Stiffness of left wrist joint        4. Stiffness of finger joint of left hand        5. Pain, wrist, left          General:    Pt was ouside racking leaves, tripped over dog  and fell on 10/2. Pt went to [facility], and was sent to [facility]. Pt was diagnosed with a fx in her ulna and radius and a dislocated elbow, Non surgical. Pt in wrist brace and elbow hinged brace at [facility] but reports she doesn't use it all the time at home and at night. Pt's daughter got an compression glove to help with the pt edema. Pt reported the pain has increase last 3 days.   General  Reason for Referral: L wrist fx  Referred By: Russell Lowe  General Comment: Eval      Precautions:        I have reviewed patients medical history form.   Pain:  Pain Assessment  Pain Assessment: 0-10  0-10 (Numeric) Pain Score: 4  Pain Location: Arm (entire LUE)  Pain Descriptors: Aching  Pain Interventions: Home medication    Objective     Home Living:  Type of Home: Other (Comment) (Ranch style)  Lives With: Adult children   Prior Function:  Level of Bertrand: Independent with ADLs and functional transfers   ADL/IADL:   Work:  at [employer], currently not working do to injury   ADL  Grooming Assistance: Independent  Bathing Assistance: Independent  UE Dressing Assistance:  (extended time)  Toileting Assistance with Device: " Independent    Therapy/Activity:    DOI: Post-op:   Re-check due on:    Exercises: Reps:                               Activities:                    Modalities:                    Manual:                    Functional review:  ROM of elbow,wrist, digits   Completed on: 11/6      Measurements:   Wrist Measurements:  WFL unless documented below   Flexion  Extension Radial Dev Ulnar Dev Supination Pronation   Right 75 80 22 35 90 80   Left 55 25 7 35 85 80     Elbow ROM:  WFL unless documented below   Flexion Extension    Right 40 180   Left 60 155     Digit Measurements:  WFL unless documented below   MCP PIP DIP DPC   Thumb 25(-5) 60(-3) NA 3   Index 50(+10) 50(-3) 20(0) 5   Long 60(+12) 65(-3) 25(0) 5   Ring 60(+20) 75(0) 25(+3) 4.5   Small 65(+30) 50((-10) 35(-10) 4       Sensation:   Pt reported some numbness in the LMF.      Outcome Measures:   Quickdash Scores: 50; 88.64%    OP EDUCATION:  Education  Individual(s) Educated: Patient, Child  Education Provided: Anatomy & Physiology, POC discussed and agreed upon  Patient/Caregiver Demonstrated Understanding: yes  Plan of Care Discussed and Agreed Upon: yes  Patient Response to Education: Patient/Caregiver Verbalized Understanding of Information    Assessment:  Pt is a 74 y.o. female who presents to this facility with performance deficits in LUE limiting ability to complete ADL and IADL tasks. OT completed medical history and ROM measurements for elbow, wrist, and digits. Pt reported 6/10 pain after measurements. Will assess / pinch strength when appropriate due to ROM limitations.     Plan:     OT Plan: Occupational therapy intervention plan to include education/instruction, electrical stimulation, hot pack, ultrasound, manual therapy,  orthotic fitting/training, self-care/home management, therapeutic exercises, therapeutic activities, and home program.  Frequency: 1-2x a week  Duration: 8x weeks    Goals:  Active       OT Goals       LTG - Patient will  indicate/ demonstrate the ability to resume all preinjury ADLs and IADLs without significant limits secondary to decreased ROM, decreased strength and/or pain as indicated by Quickdash score of less than 40%.        Start:  11/06/24            Develop and issue HEP to help maximize ROM, strength and tolerance to help maximize return to all pre-onset activities.        Start:  11/06/24            Pain to be less than or equal to 1/10 with greater than or equal to 45 minutes activity.        Start:  11/06/24            Patient will demonstrate a progressive increase in ROM as appropriate with R digits and wrist to be within 5-10 degrees of L digits and wrist to help patient resume normal ADL and IADL function.        Start:  11/06/24            Pt will demonstrate increased  strength as appropriate with the R  to be greater than or equal to 80% of the L  to help patient resume ADLs and IADLs.        Start:  11/06/24

## 2024-11-12 ENCOUNTER — TREATMENT (OUTPATIENT)
Dept: OCCUPATIONAL THERAPY | Facility: CLINIC | Age: 74
End: 2024-11-12
Payer: COMMERCIAL

## 2024-11-12 DIAGNOSIS — S53.105D ELBOW DISLOCATION, LEFT, SUBSEQUENT ENCOUNTER: ICD-10-CM

## 2024-11-12 DIAGNOSIS — S62.102A LEFT WRIST FRACTURE, CLOSED, INITIAL ENCOUNTER: ICD-10-CM

## 2024-11-12 PROCEDURE — 97110 THERAPEUTIC EXERCISES: CPT | Mod: GO

## 2024-11-12 ASSESSMENT — ACTIVITIES OF DAILY LIVING (ADL): BATHING_ASSISTANCE: INDEPENDENT

## 2024-11-12 ASSESSMENT — PAIN SCALES - GENERAL: PAINLEVEL_OUTOF10: 4

## 2024-11-12 ASSESSMENT — PAIN - FUNCTIONAL ASSESSMENT: PAIN_FUNCTIONAL_ASSESSMENT: 0-10

## 2024-11-12 ASSESSMENT — PAIN DESCRIPTION - DESCRIPTORS: DESCRIPTORS: ACHING

## 2024-11-12 NOTE — PROGRESS NOTES
"Occupational Therapy    OT Treatment    Patient Name: Dunia Moreau \"Erin\"  MRN: [id number]  Today's Date: 11/12/2024     Time Calculation  Start Time: 1125  Stop Time: 1207  Time Calculation (min): 42 min    Visit Number: 2  Therapeutic Procedures:      OT Therapeutic Procedures Time Entry  Therapeutic Exercise Time Entry: 42                         Current Problem:  1. Left wrist fracture, closed, initial encounter  Follow Up In Occupational Therapy      2. Elbow dislocation, left, subsequent encounter  Follow Up In Occupational Therapy          Subjective     General: Pt reported she is not wearing her wrist brace at night or around the house. When pt took brace off, pt had a red spot on ulnar side of the wrist, pt educated to keep an eye on the redness to prevent a pressure spot. Pt asked about using a message gun, OT recommended to not use it at this point.      Reason for Referral: L wrist fx  Referred By: Russell Lowe  General Comment: 1/20  Pain:  Pain Assessment  Pain Assessment: 0-10  0-10 (Numeric) Pain Score: 4  Pain Location: Arm  Pain Descriptors: Aching  Patient's Stated Pain Goal: No pain    Objective       Therapy/Activity:   Therapeutic Exercise  Therapeutic Exercise Performed: Yes  Therapeutic Exercise Activity 1: Fludio with ROM of LUE wrist and fingers  Therapeutic Exercise Activity 2: ROM of wrist, and digits, HEP handout      OP EDUCATION:  Education  Individual(s) Educated: Patient, Child  Education Provided: Other  Home Program: AROM, Handout issued  Patient/Caregiver Demonstrated Understanding: yes  Plan of Care Discussed and Agreed Upon: yes  Patient Response to Education: Patient/Caregiver Verbalized Understanding of Information    Access Code: [email or web address]  URL: [email or web address]  Date: 11/12/2024  Prepared by: Keely Sahni    Exercises  - Seated Forearm Pronation and Supination AROM  - 1 x daily - 7 x weekly - 3 sets - 10 reps - 5 hold  - Wrist AROM Flexion " Extension  - 1 x daily - 7 x weekly - 3 sets - 10 reps - 5 hold  - Wrist AROM Radial Ulnar Deviation  - 1 x daily - 7 x weekly - 3 sets - 10 reps - 5 hold  - Thumb Opposition  - 1 x daily - 7 x weekly - 3 sets - 10 reps  - Finger MP Flexion AROM  - 1 x daily - 7 x weekly - 3 sets - 10 reps  - Seated Finger PIP AROM  - 1 x daily - 7 x weekly - 3 sets - 10 reps  - Seated Finger DIP AROM  - 1 x daily - 7 x weekly - 3 sets - 10 reps  - Seated Elbow Flexion and Extension AROM  - 1 x daily - 7 x weekly - 3 sets - 10 reps - 5 hold      Access Code: [email or web address]  URL: [email or web address]  Date: 11/12/2024  Prepared by: Keely Sahni    Exercises  - Thumb Abduction AROM on Table  - 1 x daily - 7 x weekly - 3 sets - 10 reps  - Seated Thumb Composite Flexion AROM  - 1 x daily - 7 x weekly - 3 sets - 10 reps  - Thumb AROM: Palmar Abduction  - 1 x daily - 7 x weekly - 3 sets - 10 reps  Assessment:   Pt participated in therapeutic exercises and activities as needed to increase ROM with LUE. Pt received  HEP and showed competence with HEP. When completing ROM exercises pt reported 10 shooting pain in the volar side of the thumb at the MC joint.      Plan: Consider TENS unit at next session     OT Plan: TE, TA, manual, modalities  Duration: 8 weeks  Onset Date: 11/06/24  Rehab Potential: Good  Plan of Care Agreement: Patient  Goals:  Active       OT Goals       LTG - Patient will indicate/ demonstrate the ability to resume all preinjury ADLs and IADLs without significant limits secondary to decreased ROM, decreased strength and/or pain as indicated by Quickdash score of less than 40%.        Start:  11/06/24    Expected End:  01/29/25            Develop and issue HEP to help maximize ROM, strength and tolerance to help maximize return to all pre-onset activities.        Start:  11/06/24    Expected End:  01/29/25            Pain to be less than or equal to 1/10 with greater than or equal to 45 minutes activity.         Start:  11/06/24    Expected End:  01/29/25            Patient will demonstrate a progressive increase in ROM as appropriate with R digits and wrist to be within 5-10 degrees of L digits and wrist to help patient resume normal ADL and IADL function.        Start:  11/06/24    Expected End:  01/29/25            Pt will demonstrate increased  strength as appropriate with the R  to be greater than or equal to 80% of the L  to help patient resume ADLs and IADLs.        Start:  11/06/24    Expected End:  01/29/25

## 2024-11-14 ENCOUNTER — TREATMENT (OUTPATIENT)
Dept: OCCUPATIONAL THERAPY | Facility: CLINIC | Age: 74
End: 2024-11-14
Payer: COMMERCIAL

## 2024-11-14 DIAGNOSIS — S62.102A LEFT WRIST FRACTURE, CLOSED, INITIAL ENCOUNTER: ICD-10-CM

## 2024-11-14 DIAGNOSIS — S53.105D ELBOW DISLOCATION, LEFT, SUBSEQUENT ENCOUNTER: ICD-10-CM

## 2024-11-14 PROCEDURE — 97140 MANUAL THERAPY 1/> REGIONS: CPT | Mod: GO

## 2024-11-14 ASSESSMENT — PAIN DESCRIPTION - DESCRIPTORS: DESCRIPTORS: ACHING

## 2024-11-14 ASSESSMENT — ACTIVITIES OF DAILY LIVING (ADL): BATHING_ASSISTANCE: INDEPENDENT

## 2024-11-14 ASSESSMENT — PAIN - FUNCTIONAL ASSESSMENT: PAIN_FUNCTIONAL_ASSESSMENT: 0-10

## 2024-11-14 ASSESSMENT — PAIN SCALES - GENERAL: PAINLEVEL_OUTOF10: 5 - MODERATE PAIN

## 2024-11-14 NOTE — PROGRESS NOTES
"Occupational Therapy    OT Treatment    Patient Name: Dunia Moreau \"Erin\"  MRN: [id number]  Today's Date: 11/14/2024     Time Calculation  Start Time: 0915  Stop Time: 1000  Time Calculation (min): 45 min    Visit Number: 3  Therapeutic Procedures:      OT Therapeutic Procedures Time Entry  Manual Therapy Time Entry: 45                         Current Problem:  1. Left wrist fracture, closed, initial encounter  Follow Up In Occupational Therapy      2. Elbow dislocation, left, subsequent encounter  Follow Up In Occupational Therapy          Subjective     General:  OT Received On: 11/14/24  Reason for Referral: L wrist fx  Referred By: Russell Lowe  General Comment: 2/20    Pt reported she is following HEP 2-3 times a day. Pt reported that ortho follow up is on 11/15/2024. Pt reported that her thumb is \"dumb\" bc it doesn't want to move right.    Pain:  Pain Assessment  Pain Assessment: 0-10  0-10 (Numeric) Pain Score: 5 - Moderate pain  Pain Location: Arm  Pain Descriptors: Aching  Patient's Stated Pain Goal: No pain    Objective       Therapy/Activity:        Therapeutic Activity  Therapeutic Activity Performed: Yes  Therapeutic Activity 1: building block tower with alt pad to padx 25 blocks  Therapeutic Activity 2: FLEX/EXT over x 10 with 10 sec hold  Therapeutic Activity 3: IFC on L volar wrist input level 7  Therapeutic Activity 4: IFC on L elbow input level 9    OP EDUCATION:  Education  Individual(s) Educated: Patient, Child  Education Provided: Other  Home Program: AROM, Handout issued  Patient/Caregiver Demonstrated Understanding: yes  Plan of Care Discussed and Agreed Upon: yes  Patient Response to Education: Patient/Caregiver Verbalized Understanding of Information    Assessment:   Pt participated in therapeutic exercises and activities as needed to increase ROM. Pt tolerated IFC on wrist and elbow. When completing the block SUP/PRO activity, pt had shooting pain in forearm (10/10) that lasted 5 " secounds.      Plan:    OT Plan: TE, TA, manual, modalities  Duration: 8 weeks  Onset Date: 11/06/24  Rehab Potential: Good  Plan of Care Agreement: Patient  Goals:  Active       OT Goals       LTG - Patient will indicate/ demonstrate the ability to resume all preinjury ADLs and IADLs without significant limits secondary to decreased ROM, decreased strength and/or pain as indicated by Quickdash score of less than 40%.        Start:  11/06/24    Expected End:  01/29/25            Develop and issue HEP to help maximize ROM, strength and tolerance to help maximize return to all pre-onset activities.        Start:  11/06/24    Expected End:  01/29/25            Pain to be less than or equal to 1/10 with greater than or equal to 45 minutes activity.        Start:  11/06/24    Expected End:  01/29/25            Patient will demonstrate a progressive increase in ROM as appropriate with R digits and wrist to be within 5-10 degrees of L digits and wrist to help patient resume normal ADL and IADL function.        Start:  11/06/24    Expected End:  01/29/25            Pt will demonstrate increased  strength as appropriate with the R  to be greater than or equal to 80% of the L  to help patient resume ADLs and IADLs.        Start:  11/06/24    Expected End:  01/29/25

## 2024-11-15 ENCOUNTER — APPOINTMENT (OUTPATIENT)
Dept: ORTHOPEDIC SURGERY | Facility: CLINIC | Age: 74
End: 2024-11-15
Payer: COMMERCIAL

## 2024-11-15 ENCOUNTER — HOSPITAL ENCOUNTER (OUTPATIENT)
Dept: RADIOLOGY | Facility: CLINIC | Age: 74
Discharge: HOME | End: 2024-11-15
Payer: COMMERCIAL

## 2024-11-15 DIAGNOSIS — S62.102A LEFT WRIST FRACTURE, CLOSED, INITIAL ENCOUNTER: ICD-10-CM

## 2024-11-15 DIAGNOSIS — S53.105D ELBOW DISLOCATION, LEFT, SUBSEQUENT ENCOUNTER: ICD-10-CM

## 2024-11-15 DIAGNOSIS — M75.02 ADHESIVE CAPSULITIS OF LEFT SHOULDER: ICD-10-CM

## 2024-11-15 PROCEDURE — 73090 X-RAY EXAM OF FOREARM: CPT | Mod: LT

## 2024-11-15 PROCEDURE — 1157F ADVNC CARE PLAN IN RCRD: CPT | Performed by: SPECIALIST

## 2024-11-15 PROCEDURE — 73110 X-RAY EXAM OF WRIST: CPT | Mod: LT

## 2024-11-15 PROCEDURE — 73080 X-RAY EXAM OF ELBOW: CPT | Mod: LT

## 2024-11-15 PROCEDURE — 1160F RVW MEDS BY RX/DR IN RCRD: CPT | Performed by: SPECIALIST

## 2024-11-15 PROCEDURE — 1159F MED LIST DOCD IN RCRD: CPT | Performed by: SPECIALIST

## 2024-11-15 PROCEDURE — 1036F TOBACCO NON-USER: CPT | Performed by: SPECIALIST

## 2024-11-15 PROCEDURE — 99214 OFFICE O/P EST MOD 30 MIN: CPT | Performed by: SPECIALIST

## 2024-11-15 NOTE — ASSESSMENT & PLAN NOTE
Assessment: Left elbow dislocation now 6 weeks status post 10/2/2024 date of injury.  Left wrist minimally displaced but intra-articular fracture with greater than 3 fragments from the 10/2/2024 injury.  Left shoulder contusion and soreness    Plan:  Continue occupational therapy for elbow range of motion advancing as tolerated.  Continue occupational therapy for distal radius range of motion and strengthening advancing as tolerated.  Follow-up in 4 to 5 weeks for reevaluation and new x-rays of the left elbow and left wrist.  These should be obtained out of her elbow brace and her wrist brace.  Initiate phase 1 Codman's advancing as tolerated for the left shoulder.

## 2024-11-15 NOTE — PROGRESS NOTES
"Assessment/Plan   Encounter Diagnoses:  Elbow dislocation, left, subsequent encounter    Left wrist fracture, closed, initial encounter  Elbow dislocation, left, subsequent encounter  Assessment: Left elbow dislocation now 6 weeks status post 10/2/2024 date of injury.  Left wrist minimally displaced but intra-articular fracture with greater than 3 fragments from the 10/2/2024 injury.    Plan:  Continue occupational therapy for elbow range of motion advancing as tolerated.  Continue occupational therapy for distal radius range of motion and strengthening advancing as tolerated.  Follow-up in 4 to 5 weeks for reevaluation and new x-rays of the left elbow and left wrist.  These should be obtained out of her elbow brace and her wrist brace.       Subjective    Patient ID: Dunia Moreau \"Izzy" is a 74 y.o. female.    Chief Complaint: No chief complaint on file.     Last Surgery: No surgery found  Last Surgery Date: No surgery found    HPI  74-year-old who is 3 weeks status post 10/2/2024 elbow dislocation and left distal radius fracture.    OBJECTIVE: ORTHO EXAM  Left elbow exam  Swelling is reduced.  The bruising is resolved.  She demonstrates flexion to 130 degrees.  Full elbow extension without any apprehension.    Full pronation and supination.  She had no apprehension with range of motion nor with varus or valgus stress.    Left wrist  Bruising is resolved.  Swelling is reduced.  Neurovascularly intact distally.  She has dorsiflexion to 30 degrees and palmar flexion to 35.  Full pronation and supination.  Minimally tender to palpation over the distal radius but this is improved from previous evaluations.    Left shoulder  Forward elevation to 160.  External rotation to 70 at 90 degrees of abduction  Internal rotation is 60 and 90 degrees of abduction  Minimally globally tender to palpation  Strength testing was limited somewhat by patient cooperation and guarding.  She has at least 4/5 to all 3.    IMAGE " RESULTS:  XR wrist left 3+ views  Narrative: Interpreted By:  Elise Solis,   STUDY:  XR WRIST LEFT 3+ VIEWS 10/25/2024 9:11 am      INDICATION:  Signs/Symptoms:LEFT WRIST FRACTURE      COMPARISON:  10/11/2024      ACCESSION NUMBER(S):  [id number]      ORDERING CLINICIAN:  BRITT KRISHNAN      TECHNIQUE:  Three views of the left wrist      FINDINGS:  There is an old healed fracture and resultant deformity of the distal  ulnar diaphysis unchanged.      There is a comminuted intra-articular fracture of the distal radius  seen. When compared with prior examination, there is now some  reactive sclerosis at the fracture sites with no displacement or  angulation observed. This would indicate presence of healing.      There is marked narrowing of the triscaphe joint and narrowing of the  1st CMC joint.      Impression: Nondisplaced comminuted healing fracture of the distal radius with  intra-articular extension.      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      Residual deformity of the distal ulnar diaphysis due to old healed  fracture.      Signed by: Elise Solis 10/28/2024 6:02 PM  Dictation workstation:   [id number]      ULTRASOUND  none    Procedures     Orders Placed This Encounter    XR elbow left 3+ views    XR wrist left 3+ views

## 2024-11-26 ENCOUNTER — EVALUATION (OUTPATIENT)
Dept: PHYSICAL THERAPY | Facility: CLINIC | Age: 74
End: 2024-11-26
Payer: COMMERCIAL

## 2024-11-26 DIAGNOSIS — M25.612 DECREASED SHOULDER MOBILITY, LEFT: Primary | ICD-10-CM

## 2024-11-26 DIAGNOSIS — M75.02 ADHESIVE CAPSULITIS OF LEFT SHOULDER: ICD-10-CM

## 2024-11-26 PROCEDURE — 97161 PT EVAL LOW COMPLEX 20 MIN: CPT | Mod: GP

## 2024-11-26 PROCEDURE — 97110 THERAPEUTIC EXERCISES: CPT | Mod: GP

## 2024-11-26 ASSESSMENT — PAIN SCALES - GENERAL: PAINLEVEL_OUTOF10: 1

## 2024-11-26 ASSESSMENT — PAIN DESCRIPTION - DESCRIPTORS: DESCRIPTORS: ACHING;SORE

## 2024-11-26 ASSESSMENT — PAIN - FUNCTIONAL ASSESSMENT: PAIN_FUNCTIONAL_ASSESSMENT: 0-10

## 2024-11-26 NOTE — PROGRESS NOTES
Physical Therapy    Physical Therapy Evaluation    Patient Name: Dunia Moreau  MRN: [id number]  : 1950  Referring Physician: Russell Lowe  Today's Date: 2024  Time Calculation  Start Time: 1130  Stop Time: 1211  Time Calculation (min): 41 min  PT Evaluation Time Entry  PT Evaluation (Low) Time Entry: 29  PT Therapeutic Procedures Time Entry  Therapeutic Exercise Time Entry: 12           General  Reason for Referral: L Shoulder adhesive capsilitis  Referred By: Russell Lowe  General Comment: Visit  POC    Current Problem  Problem List Items Addressed This Visit             ICD-10-CM       Musculoskeletal and Injuries    Adhesive capsulitis of left shoulder M75.02    Relevant Orders    Follow Up In Physical Therapy    Decreased shoulder mobility, left - Primary M25.612    Relevant Orders    Follow Up In Physical Therapy          SUBJECTIVE  Subjective   Patient reports L shoulder pain that began 10/2/24 after a fall . This is leading to difficulty with Lifting, Using arm at Shoulder level, Reaching, Shoulder elevation, and ADLs .  Pain is reported to range 1-5/10 with daily activities.  Patient states that their goal is to regain function with physical therapy intervention.    Prior level of function: WNL    Patient's name and  were confirmed this date.    Pt reports fall on 10/2/24 that led to fx radius/ulna/wrist and dislocated elbow. Pt has been having L shoulder pain ever since. Pt says that she has trouble with elevating, reaching, lifting, getting dressed. Pt is R handed. Pt has tried used heat and ice with minimal relief. Pt had x-ray of shoulder 10/11 that was unremarkable.     Precautions  Precautions  Precautions Comment: osteopenia, fx L ulnar/radius, wrist       Pain  Pain Assessment: 0-10  0-10 (Numeric) Pain Score: 1 (At worst a 5)  Pain Type: Acute pain  Pain Location: Shoulder  Pain Orientation: Left  Pain Radiating Towards: Lateral deltoid  Pain Descriptors: Aching, Sore  Pain  "Frequency: Constant/continuous    Barriers to Participation: Co-Pay     OBJECTIVE:    Upper Extremity Strength:  UE strength not listed below is WNL  MMT 5/5 max  LEFT RIGHT   Shoulder Flexion 4, P! 5   Shoulder Abduction 4-, P! 4+   Shoulder ER 4, P! 5   Shoulder IR 4, P! 5   Elbow Flexion     Elbow Extension     Mid Traps     Rhomboids     Lower Traps       Upper Extremity ROM:   UE ROM not listed below is WNL    Shoulder  Flexion AROM: Left 145, P!  Right 154  FlexionPROM: Left nt  Right nt  ABD AROM: Left 75, P!, notes some popping                          Right 150  ABD PROM: Left nt                          Right nt  ER PROM: nt  IR PROM: nt  Functional ER: Left C7                      Right T2  Functional IR: Left L PSIS,P!                       Right T10        Joint mobility: nt        Palpation: TTP along L teres minor, infraspinatus, UT, Levator     Special tests:  SHOULDER RIGHT LEFT   Hawkin's-Gold  -   Neer Impingement      Drop arm     Infraspinatus muscle test     ER lag sign     Lift off     Apprehension     Relocation     Cross-body adduction     Jerk test  +   Biceps Load 2     Speeds test  +           Other Measures  Disability of Arm Shoulder Hand (DASH): 72.73       TREATMENT:     Pulley into flexion x10  Shoulder ER isometric 5\" hold x10  Scap retractions 3\" hold x10  PATIENT EDUCATION:  Access Code: [email or web address]  URL: [email or web address]  Date: 11/26/2024  Prepared by: Ravindra Fernández    Exercises  - Seated Scapular Retraction  - 1 x daily - 7 x weekly - 3 sets - 10 reps - 3s hold  - Standing Isometric Shoulder External Rotation with Doorway  - 1 x daily - 7 x weekly - 3 sets - 10 reps - 5s hold  - Seated Shoulder Flexion AAROM with Pulley Behind  - 1 x daily - 7 x weekly - 3 sets - 10 reps    ASSESSEMENT  Pt is a 74 y.o.  referred for physical therapy by Russell Lowe MD  for L GH adhesive capsulitis with signs and symptoms consistent with potential RTC strain and " impingement. Pt presents with Pain, Pain with Activity, Decreased ROM, Decreased Strength, Increased Tissue tension, Increased Tissue tenderness, and Decreased functional mobility  that is affecting Lifting, Using arm at Shoulder level, Reaching, Shoulder elevation, and ADLs . This patient would benefit from a therapy program to restore prior level of function, decrease pain, increase AROM, increase strength and improve posture. Pt tolerated all treatment at this time.     Rehab potential: Good    Plan for next visit: Review HEP, Shoulder ROM, RTC strengthening, Scapular strengthening     PLAN  Treatment/Interventions: Aquatic therapy, Biofeedback, Blood flow restriction therapy, Canalith repositioning, Cryotherapy, Dry needling, Education/ Instruction, Electrical stimulation, Hot pack, Iontophoresis, Laser, Manual therapy, Mechanical traction, Neuromuscular re-education, Therapeutic activities, Taping techniques, Therapeutic exercises, Ultrasound, Vasopneumatic device  PT Plan: Skilled PT  PT Frequency: 1 time per week  Duration: 6 weeks  Onset Date: 10/02/24  Rehab Potential: Good  Plan of Care Agreement: Patient    Pt. Is in agreement with PT plan.  Goals:  Active       PT Problem       PT L Shoulder Goals       Start:  11/26/24    Expected End:  02/04/25       1. Pt will adhere to and complete HEP in order to improve functionality outside of the clinic. (2 weeks)    2.   Pt will report 1/10 pain when performing Lifting, Using arm at Shoulder level, Reaching, Shoulder elevation, and ADLs in order to improve quality of life, functional mobility, and maintain functional independence. (5-6 weeks)    3.   Pt will improve postural awareness in order to reduce reoccurrence of impairments. (2-3 weeks)    4.   Pt will increase strength of LUE to  4+/5 in order to improve Lifting, Using arm at Shoulder level, Reaching, Shoulder elevation, and ADLs. (3-4 weeks)    5.   Pt will improve ROM of L shoulder to match R shoulder  in order to improve Lifting, Using arm at Shoulder level, Reaching, Shoulder elevation, and ADLs. (4-5 weeks)    6.  Pt goal: Reduce pain and regain motion     7. Pt will improve QDASH by 13% (MCID) to show reduction in disability and improvement in functionality. (5-6 weeks)

## 2024-12-04 ENCOUNTER — DOCUMENTATION (OUTPATIENT)
Dept: OCCUPATIONAL THERAPY | Facility: CLINIC | Age: 74
End: 2024-12-04
Payer: COMMERCIAL

## 2024-12-04 NOTE — PROGRESS NOTES
"Occupational Therapy                 Therapy Communication Note    Patient Name: Dunia Moreau \"Erin\"  MRN: [id number]  Department:   Room: Room/bed info not found  Today's Date: 12/4/2024     Discipline: Occupational Therapy    Missed Visit Reason:   Unknown    Missed Time: No Show    Comment:Attempted to call patient, no answer. This is patients 1st no show this POC  "

## 2024-12-11 ENCOUNTER — TREATMENT (OUTPATIENT)
Dept: OCCUPATIONAL THERAPY | Facility: CLINIC | Age: 74
End: 2024-12-11
Payer: COMMERCIAL

## 2024-12-11 ENCOUNTER — TREATMENT (OUTPATIENT)
Dept: PHYSICAL THERAPY | Facility: CLINIC | Age: 74
End: 2024-12-11
Payer: COMMERCIAL

## 2024-12-11 DIAGNOSIS — M25.612 DECREASED SHOULDER MOBILITY, LEFT: Primary | ICD-10-CM

## 2024-12-11 DIAGNOSIS — S62.102A LEFT WRIST FRACTURE, CLOSED, INITIAL ENCOUNTER: ICD-10-CM

## 2024-12-11 DIAGNOSIS — S53.105D ELBOW DISLOCATION, LEFT, SUBSEQUENT ENCOUNTER: ICD-10-CM

## 2024-12-11 DIAGNOSIS — M75.02 ADHESIVE CAPSULITIS OF LEFT SHOULDER: ICD-10-CM

## 2024-12-11 PROCEDURE — 97140 MANUAL THERAPY 1/> REGIONS: CPT | Mod: GP,CQ

## 2024-12-11 PROCEDURE — 97110 THERAPEUTIC EXERCISES: CPT | Mod: GP,CQ

## 2024-12-11 PROCEDURE — L3912 HFO FLEXION GLOVE PRE OTS: HCPCS

## 2024-12-11 PROCEDURE — 97110 THERAPEUTIC EXERCISES: CPT | Mod: GO

## 2024-12-11 ASSESSMENT — ACTIVITIES OF DAILY LIVING (ADL): BATHING_ASSISTANCE: INDEPENDENT

## 2024-12-11 ASSESSMENT — PAIN - FUNCTIONAL ASSESSMENT: PAIN_FUNCTIONAL_ASSESSMENT: 0-10

## 2024-12-11 ASSESSMENT — PAIN SCALES - GENERAL: PAINLEVEL_OUTOF10: 3

## 2024-12-11 NOTE — PROGRESS NOTES
"Occupational Therapy    OT Treatment    Patient Name: Dunia Moreau \"Erin\"  MRN: [id number]  Today's Date: 12/11/2024     Time Calculation  Start Time: 1000  Stop Time: 1045  Time Calculation (min): 45 min    Visit Number: 4  Therapeutic Procedures:      OT Therapeutic Procedures Time Entry  Therapeutic Exercise Time Entry: 45                         Current Problem:  1. Left wrist fracture, closed, initial encounter  Follow Up In Occupational Therapy      2. Elbow dislocation, left, subsequent encounter  Follow Up In Occupational Therapy          Subjective     General:   Pt report theres no updates, ortho note sole petit \"Continue occupational therapy for distal radius range of motion and strengthening advancing as tolerated. \"  Reason for Referral: L wrist fx  Referred By: Russell Lowe  General Comment: 3/20  Pain:  Pain Assessment  Patient's Stated Pain Goal: No pain    Objective       Therapy/Activity:            Therapeutic Exercise  Therapeutic Exercise Performed: Yes  Therapeutic Exercise Activity 1: HEP program witgh DB  Therapeutic Exercise Activity 2: HEP t-FOAM        OP EDUCATION:  Education  Individual(s) Educated: Patient, Child  Education Provided: Other  Home Program: AROM, Handout issued  Patient/Caregiver Demonstrated Understanding: yes  Plan of Care Discussed and Agreed Upon: yes  Patient Response to Education: Patient/Caregiver Verbalized Understanding of Information    Access Code: [email or web address]  URL: [email or web address]  Date: 12/11/2024  Prepared by: Keely Sahni    Exercises  - Seated Wrist Extension with Dumbbell  - 1 x daily - 7 x weekly - 3 sets - 10 reps  - Seated Wrist Flexion with Dumbbell  - 1 x daily - 7 x weekly - 3 sets - 10 reps  - Seated Wrist Radial Deviation with Dumbbell  - 1 x daily - 7 x weekly - 3 sets - 10 reps  - Forearm Supination with Dumbbell  - 1 x daily - 7 x weekly - 3 sets - 10 reps  - Forearm Pronation with Dumbbell  - 1 x daily - 7 x weekly " - 3 sets - 10 reps  - Seated Single Arm Bicep Curls Supinated with Dumbbell  - 1 x daily - 7 x weekly - 3 sets - 10 reps  - Standing Bent Over Single Arm Tricep Extension with Dumbbell and PLB  - 1 x daily - 7 x weekly - 3 sets - 10 reps    T- foam with foam handout    Assessment:   Pt participated in therapeutic exercises and activities as needed to increase ROM and strength. Pt  provided with HEP including t-foam and DB exercises. Handouts provided to pt. Pt demonstrated understanding.      Plan:  OT Plan: TE, TA, manual, modalities  Duration: 8 weeks  Onset Date: 11/06/24  Rehab Potential: Good  Plan of Care Agreement: Patient  Goals:  Active       OT Goals       LTG - Patient will indicate/ demonstrate the ability to resume all preinjury ADLs and IADLs without significant limits secondary to decreased ROM, decreased strength and/or pain as indicated by Quickdash score of less than 40%.        Start:  11/06/24    Expected End:  01/29/25            Develop and issue HEP to help maximize ROM, strength and tolerance to help maximize return to all pre-onset activities.        Start:  11/06/24    Expected End:  01/29/25            Pain to be less than or equal to 1/10 with greater than or equal to 45 minutes activity.        Start:  11/06/24    Expected End:  01/29/25            Patient will demonstrate a progressive increase in ROM as appropriate with R digits and wrist to be within 5-10 degrees of L digits and wrist to help patient resume normal ADL and IADL function.        Start:  11/06/24    Expected End:  01/29/25            Pt will demonstrate increased  strength as appropriate with the R  to be greater than or equal to 80% of the L  to help patient resume ADLs and IADLs.        Start:  11/06/24    Expected End:  01/29/25

## 2024-12-11 NOTE — PROGRESS NOTES
Physical Therapy Treatment    Patient Name: Dunia Moreau  MRN: [id number]  Today's Date: 12/11/2024  Time Calculation  Start Time: 1045  Stop Time: 1128  Time Calculation (min): 43 min  PT Therapeutic Procedures Time Entry  Manual Therapy Time Entry: 18  Therapeutic Exercise Time Entry: 23       Assessment:   Patient requires verbal cues for slow controlled concentric/eccentric motions with AAROM and resisted PRE's. Patient requires verbal cues to relax with PROM with patient demo'g good understanding. Patient demo's restriction in L GH musculature but responds well to STM. Patient deshawn's improvements in symptoms pre and post session.     Plan:  Continue to work on improving strength and ROM to be able to perform household chores with little to no difficulty. -AB.     OP PT Plan  Treatment/Interventions: Aquatic therapy, Biofeedback, Blood flow restriction therapy, Canalith repositioning, Cryotherapy, Dry needling, Education/ Instruction, Electrical stimulation, Hot pack, Iontophoresis, Laser, Manual therapy, Mechanical traction, Neuromuscular re-education, Therapeutic activities, Taping techniques, Therapeutic exercises, Ultrasound, Vasopneumatic device  PT Plan: Skilled PT  PT Frequency: 1 time per week  Duration: 6 weeks  Onset Date: 10/02/24  Rehab Potential: Good  Plan of Care Agreement: Patient    Current Problem  Problem List Items Addressed This Visit             ICD-10-CM    Adhesive capsulitis of left shoulder M75.02    Decreased shoulder mobility, left - Primary M25.612       Subjective   General  Reason for Referral: L Shoulder adhesive capsilitis  Referred By: Russell Lowe  General Comment: Visit 2/7 POC  Visit: 2  HEP: Yes  Patient states that her elbow is feeling sore.     Precautions  Precautions  Precautions Comment: osteopenia, fx L ulnar/radius, wrist    Pain  Pain Assessment: 0-10  0-10 (Numeric) Pain Score: 3  Pain Location: Shoulder  Pain Orientation: Left    Objective  "    Treatments:  Therapeutic Exercise: 23 minutes, 2 units  UBE x3' Fwd/x3' Bkwd (N)  Pulley into flexion x3' 5\" hold   Standing Rows 2x10 Purple Tube (N)  Standing GH extension 2x10 Purple Tube (N)  Standing ER x15 L Orange Tband (N)  Standing IR x15 L Mint green Tband (N)  Finger ladder x10 L to 18 Flexion (N)  Shoulder ER isometric 5\" hold x10  Scap retractions 3\" hold x10    Manual Therapy: 18 minutes, 1 units  STM to L bicep, lateral delt, upper trap (N)  PROM L flexion (N)    OP EDUCATION:   Access Code: [email or web address]  URL: [email or web address]  Date: 11/26/2024  Prepared by: Ravindra Fernández    Exercises  - Seated Scapular Retraction  - 1 x daily - 7 x weekly - 3 sets - 10 reps - 3s hold  - Standing Isometric Shoulder External Rotation with Doorway  - 1 x daily - 7 x weekly - 3 sets - 10 reps - 5s hold  - Seated Shoulder Flexion AAROM with Pulley Behind  - 1 x daily - 7 x weekly - 3 sets - 10 reps    Goals:  Active       PT Problem       PT L Shoulder Goals       Start:  11/26/24    Expected End:  02/04/25       1. Pt will adhere to and complete HEP in order to improve functionality outside of the clinic. (2 weeks)    2.   Pt will report 1/10 pain when performing Lifting, Using arm at Shoulder level, Reaching, Shoulder elevation, and ADLs in order to improve quality of life, functional mobility, and maintain functional independence. (5-6 weeks)    3.   Pt will improve postural awareness in order to reduce reoccurrence of impairments. (2-3 weeks)    4.   Pt will increase strength of LUE to  4+/5 in order to improve Lifting, Using arm at Shoulder level, Reaching, Shoulder elevation, and ADLs. (3-4 weeks)    5.   Pt will improve ROM of L shoulder to match R shoulder in order to improve Lifting, Using arm at Shoulder level, Reaching, Shoulder elevation, and ADLs. (4-5 weeks)    6.  Pt goal: Reduce pain and regain motion     7. Pt will improve QDASH by 13% (MCID) to show reduction in disability and " improvement in functionality. (5-6 weeks)

## 2024-12-18 ENCOUNTER — TREATMENT (OUTPATIENT)
Dept: OCCUPATIONAL THERAPY | Facility: CLINIC | Age: 74
End: 2024-12-18
Payer: COMMERCIAL

## 2024-12-18 ENCOUNTER — TREATMENT (OUTPATIENT)
Dept: PHYSICAL THERAPY | Facility: CLINIC | Age: 74
End: 2024-12-18
Payer: COMMERCIAL

## 2024-12-18 DIAGNOSIS — M25.612 DECREASED SHOULDER MOBILITY, LEFT: Primary | ICD-10-CM

## 2024-12-18 DIAGNOSIS — M75.02 ADHESIVE CAPSULITIS OF LEFT SHOULDER: ICD-10-CM

## 2024-12-18 DIAGNOSIS — S62.102A LEFT WRIST FRACTURE, CLOSED, INITIAL ENCOUNTER: ICD-10-CM

## 2024-12-18 DIAGNOSIS — S53.105D ELBOW DISLOCATION, LEFT, SUBSEQUENT ENCOUNTER: ICD-10-CM

## 2024-12-18 PROCEDURE — 97530 THERAPEUTIC ACTIVITIES: CPT | Mod: GO

## 2024-12-18 PROCEDURE — 97110 THERAPEUTIC EXERCISES: CPT | Mod: GP,CQ

## 2024-12-18 PROCEDURE — 97110 THERAPEUTIC EXERCISES: CPT | Mod: GO

## 2024-12-18 ASSESSMENT — PAIN - FUNCTIONAL ASSESSMENT
PAIN_FUNCTIONAL_ASSESSMENT: 0-10
PAIN_FUNCTIONAL_ASSESSMENT: 0-10

## 2024-12-18 ASSESSMENT — PAIN SCALES - GENERAL
PAINLEVEL_OUTOF10: 1
PAINLEVEL_OUTOF10: 4

## 2024-12-18 ASSESSMENT — ACTIVITIES OF DAILY LIVING (ADL): BATHING_ASSISTANCE: INDEPENDENT

## 2024-12-18 ASSESSMENT — PAIN DESCRIPTION - DESCRIPTORS: DESCRIPTORS: ACHING;SORE

## 2024-12-18 NOTE — PROGRESS NOTES
"Physical Therapy Treatment    Patient Name: Dunia Moreau  MRN: [id number]  Today's Date: 12/18/2024  Time Calculation  Start Time: 1047  Stop Time: 1129  Time Calculation (min): 42 min  PT Therapeutic Procedures Time Entry  Therapeutic Exercise Time Entry: 38       Assessment:   Patient deshawn's good form throughout session. Requires verbal cues for slow controlled concentric/eccentric motion with AAROM overhead flexion with patient deshawn'nancy good understanding. No change in symptoms pre and post session.     Plan:  Continue to work on improving strength and decreasing pain to be able to perform household chores with little to no difficulty. -AB.     OP PT Plan  Treatment/Interventions: Aquatic therapy, Biofeedback, Blood flow restriction therapy, Canalith repositioning, Cryotherapy, Dry needling, Education/ Instruction, Electrical stimulation, Hot pack, Iontophoresis, Laser, Manual therapy, Mechanical traction, Neuromuscular re-education, Therapeutic activities, Taping techniques, Therapeutic exercises, Ultrasound, Vasopneumatic device  PT Plan: Skilled PT  PT Frequency: 1 time per week  Duration: 6 weeks  Onset Date: 10/02/24  Rehab Potential: Good  Plan of Care Agreement: Patient    Current Problem  Problem List Items Addressed This Visit             ICD-10-CM    Adhesive capsulitis of left shoulder M75.02    Decreased shoulder mobility, left - Primary M25.612       Subjective   General  Reason for Referral: L Shoulder adhesive capsilitis  Referred By: Russell Lowe  General Comment: Visit 3/7 POC  Visit: 3  HEP: Yes  Patient states that she's not really having pain today.     Precautions  Precautions  Precautions Comment: osteopenia, fx L ulnar/radius, wrist    Pain  Pain Assessment: 0-10  0-10 (Numeric) Pain Score: 1  Pain Location: Shoulder  Pain Orientation: Left    Objective     Treatments:  Therapeutic Exercise: 38 minutes, 3 units  UBE x3' Fwd/x3' Bkwd   Pulley into flexion x3' 5\" hold   Standing Rows 2x10 " "Purple Tube   Standing GH extension 2x10 Purple Tube   Standing ER x15 L Orange Tband   Standing IR x15 L Mint green Tband   Finger ladder x10 L to 18 Flexion   Shoulder ER isometric 5\" hold x10  Standing isometric Flexion 5\" x10 (P, reps)   Supine AAROM Bench press 2x10 (N)  Supine AAROM OH flexion 2x10 (N)  Scap retractions 3\" hold x10    Manual Therapy: Not today: 12-18-24  STM to L bicep, lateral delt, upper trap (N)  PROM L flexion (N)    OP EDUCATION:   Access Code: [email or web address]  URL: [email or web address]  Date: 11/26/2024  Prepared by: Ravindra Fernández    Exercises  - Seated Scapular Retraction  - 1 x daily - 7 x weekly - 3 sets - 10 reps - 3s hold  - Standing Isometric Shoulder External Rotation with Doorway  - 1 x daily - 7 x weekly - 3 sets - 10 reps - 5s hold  - Seated Shoulder Flexion AAROM with Pulley Behind  - 1 x daily - 7 x weekly - 3 sets - 10 reps    Goals:  Active       PT Problem       PT L Shoulder Goals       Start:  11/26/24    Expected End:  02/04/25       1. Pt will adhere to and complete HEP in order to improve functionality outside of the clinic. (2 weeks)    2.   Pt will report 1/10 pain when performing Lifting, Using arm at Shoulder level, Reaching, Shoulder elevation, and ADLs in order to improve quality of life, functional mobility, and maintain functional independence. (5-6 weeks)    3.   Pt will improve postural awareness in order to reduce reoccurrence of impairments. (2-3 weeks)    4.   Pt will increase strength of LUE to  4+/5 in order to improve Lifting, Using arm at Shoulder level, Reaching, Shoulder elevation, and ADLs. (3-4 weeks)    5.   Pt will improve ROM of L shoulder to match R shoulder in order to improve Lifting, Using arm at Shoulder level, Reaching, Shoulder elevation, and ADLs. (4-5 weeks)    6.  Pt goal: Reduce pain and regain motion     7. Pt will improve QDASH by 13% (MCID) to show reduction in disability and improvement in functionality. (5-6 " weeks)

## 2024-12-18 NOTE — PROGRESS NOTES
"Occupational Therapy    OT Treatment    Patient Name: Dunia Moreau \"Erin\"  MRN: [id number]  Today's Date: 12/18/2024     Time Calculation  Start Time: 1000  Stop Time: 1045  Time Calculation (min): 45 min    Visit Number: 5  Therapeutic Procedures:      OT Therapeutic Procedures Time Entry  Therapeutic Activity Time Entry: 20  Therapeutic Exercise Time Entry: 25                         Current Problem:  1. Left wrist fracture, closed, initial encounter  Follow Up In Occupational Therapy      2. Elbow dislocation, left, subsequent encounter  Follow Up In Occupational Therapy          Subjective     General:  Pt reported that she tried tiger balm, pt reported no different. Patient is independent with current HEP, 4-5 days a week. Pt will go to ortho 12/20/2024.       Reason for Referral: L wrist fx  Referred By: Russell Lowe  General Comment: 4/20  Pain:  Pain Assessment  Pain Assessment: 0-10  0-10 (Numeric) Pain Score: 4  Pain Location: Elbow  Pain Descriptors: Aching, Sore  Patient's Stated Pain Goal: No pain    Objective       Therapy/Activity:      Therapeutic Activity  Therapeutic Activity Performed: Yes  Therapeutic Activity 1: Moist heat w/ interview  Therapeutic Activity 2: Strength Assessment  Therapeutic Activity 3: grooved Pegboard x25     Therapeutic Exercise  Therapeutic Exercise Activity 1: AROM of digits  Therapeutic Exercise Activity 2: Nerve glides x 10 reps x 3 sets        OP EDUCATION:  Education  Individual(s) Educated: Patient, Child  Education Provided: Other  Home Program: AROM, Handout issued  Patient/Caregiver Demonstrated Understanding: yes  Plan of Care Discussed and Agreed Upon: yes  Patient Response to Education: Patient/Caregiver Verbalized Understanding of Information    Assessment:   Pt participated in therapeutic exercises and activities as needed to increase ROM, and strength. OT assessed strength, pt showed deficits in LUE  strength. Pt would benefit from skilled OT to cont to " address strength deficit. Pt tolerated exercise.      :12/8/2024  Average taken from best 3 measurements.   Trials Average   RUE 40/40/35 38.3   LUE 10/15/13 12.6      Plan:  OT Plan: TE, TA, manual, modalities  Duration: 8 weeks  Onset Date: 11/06/24  Rehab Potential: Good  Plan of Care Agreement: Patient  Goals:  Active       OT Goals       LTG - Patient will indicate/ demonstrate the ability to resume all preinjury ADLs and IADLs without significant limits secondary to decreased ROM, decreased strength and/or pain as indicated by Quickdash score of less than 40%.        Start:  11/06/24    Expected End:  01/29/25            Develop and issue HEP to help maximize ROM, strength and tolerance to help maximize return to all pre-onset activities.        Start:  11/06/24    Expected End:  01/29/25            Pain to be less than or equal to 1/10 with greater than or equal to 45 minutes activity.        Start:  11/06/24    Expected End:  01/29/25            Patient will demonstrate a progressive increase in ROM as appropriate with R digits and wrist to be within 5-10 degrees of L digits and wrist to help patient resume normal ADL and IADL function.        Start:  11/06/24    Expected End:  01/29/25            Pt will demonstrate increased  strength as appropriate with the R  to be greater than or equal to 80% of the L  to help patient resume ADLs and IADLs.        Start:  11/06/24    Expected End:  01/29/25

## 2024-12-20 ENCOUNTER — APPOINTMENT (OUTPATIENT)
Dept: ORTHOPEDIC SURGERY | Facility: CLINIC | Age: 74
End: 2024-12-20
Payer: COMMERCIAL

## 2024-12-20 ENCOUNTER — HOSPITAL ENCOUNTER (OUTPATIENT)
Dept: RADIOLOGY | Facility: CLINIC | Age: 74
Discharge: HOME | End: 2024-12-20
Payer: COMMERCIAL

## 2024-12-20 ENCOUNTER — HOSPITAL ENCOUNTER (OUTPATIENT)
Dept: RADIOLOGY | Facility: EXTERNAL LOCATION | Age: 74
Discharge: HOME | End: 2024-12-20

## 2024-12-20 DIAGNOSIS — S62.102A LEFT WRIST FRACTURE, CLOSED, INITIAL ENCOUNTER: ICD-10-CM

## 2024-12-20 DIAGNOSIS — M75.02 ADHESIVE CAPSULITIS OF LEFT SHOULDER: ICD-10-CM

## 2024-12-20 DIAGNOSIS — S53.105D ELBOW DISLOCATION, LEFT, SUBSEQUENT ENCOUNTER: ICD-10-CM

## 2024-12-20 PROCEDURE — 1160F RVW MEDS BY RX/DR IN RCRD: CPT | Performed by: SPECIALIST

## 2024-12-20 PROCEDURE — 99214 OFFICE O/P EST MOD 30 MIN: CPT | Performed by: SPECIALIST

## 2024-12-20 PROCEDURE — 73110 X-RAY EXAM OF WRIST: CPT | Mod: LT

## 2024-12-20 PROCEDURE — 1036F TOBACCO NON-USER: CPT | Performed by: SPECIALIST

## 2024-12-20 PROCEDURE — 1159F MED LIST DOCD IN RCRD: CPT | Performed by: SPECIALIST

## 2024-12-20 PROCEDURE — 73030 X-RAY EXAM OF SHOULDER: CPT | Mod: LT

## 2024-12-20 PROCEDURE — 73080 X-RAY EXAM OF ELBOW: CPT | Mod: LT

## 2024-12-20 PROCEDURE — 1157F ADVNC CARE PLAN IN RCRD: CPT | Performed by: SPECIALIST

## 2024-12-20 ASSESSMENT — PAIN DESCRIPTION - DESCRIPTORS: DESCRIPTORS: TINGLING;PINS AND NEEDLES

## 2024-12-20 ASSESSMENT — PAIN - FUNCTIONAL ASSESSMENT
PAIN_FUNCTIONAL_ASSESSMENT: 0-10
PAIN_FUNCTIONAL_ASSESSMENT: 0-10

## 2024-12-20 NOTE — PROGRESS NOTES
"Assessment/Plan   Encounter Diagnoses:  Left wrist fracture, closed, initial encounter  Elbow dislocation, left, subsequent encounter  Assessment: Left elbow dislocation now approximately 2-1/2 months status post 10/2/2024 date of injury.  Left wrist minimally displaced but intra-articular fracture with greater than 3 fragments from the 10/2/2024 injury.  Left shoulder contusion and soreness    Plan:  Continue occupational therapy for elbow range of motion advancing as tolerated.  Continue occupational therapy for distal radius range of motion and strengthening advancing as tolerated.  Follow-up in 2 months for reevaluation and new x-rays of the left elbow and left wrist.  These should be obtained out of her elbow brace and her wrist brace.  She can use her bracing on a as needed basis.  Continue with the therapy for the shoulder as well  She did not need refills of any pain medications.       Subjective    Patient ID: Dunia Moreau \"Erin\" is a 74 y.o. female.    Chief Complaint: Follow-up of the Left Elbow, Follow-up of the Left Wrist, and Follow-up of the Left Hand     Last Surgery: No surgery found  Last Surgery Date: No surgery found    HPI  74-year-old who is 3 weeks status post 10/2/2024 elbow dislocation and left distal radius fracture.      12/20/2024-she comes in today for reevaluation 2-1/2 months status post her 10/2/2024 injury.  OBJECTIVE: ORTHO EXAM    Left elbow exam  No swelling or bruising is noted.    She demonstrates flexion to 140 degrees.  Full elbow extension without any apprehension.    Full pronation and supination.  She had no apprehension with range of motion nor with varus or valgus stress.    Left wrist  Swelling and bruising is resolved.  Neurovascularly intact distally.  She has dorsiflexion to greater than 45 degrees and palmar flexion to greater than 45.  Full pronation and supination.  None tender to palpation over the distal radius but this is improved from previous " evaluations.  She states that she has some numbness and tingling in the middle and ring fingers.  She points to the dorsum and to the palmar aspect of both fingers.  On the ring finger she does not distinguish between the ulnar border and the radial border.  This has been slowly improving.    Left shoulder  Forward elevation to 170.  External rotation to 80 at 90 degrees of abduction  Internal rotation is 7 yes ma'am with this lady yeah I am done I did call nontender 0 and 90 degrees of abduction  Nontender  to palpation  Strength testing was limited somewhat by patient cooperation and guarding.  She has at least 5/5 to all 3.    IMAGE RESULTS:  Point of Care Ultrasound  These images are not reportable by radiology and will not be interpreted   by  Radiologists.      ULTRASOUND  none    Procedures     Orders Placed This Encounter    Point of Care Ultrasound

## 2024-12-20 NOTE — ASSESSMENT & PLAN NOTE
Assessment: Left elbow dislocation now approximately 2-1/2 months status post 10/2/2024 date of injury.  Left wrist minimally displaced but intra-articular fracture with greater than 3 fragments from the 10/2/2024 injury.  Left shoulder contusion and soreness    Plan:  Continue occupational therapy for elbow range of motion advancing as tolerated.  Continue occupational therapy for distal radius range of motion and strengthening advancing as tolerated.  Follow-up in 2 months for reevaluation and new x-rays of the left elbow and left wrist.  These should be obtained out of her elbow brace and her wrist brace.  She can use her bracing on a as needed basis.  Continue with the therapy for the shoulder as well  She did not need refills of any pain medications.

## 2024-12-23 DIAGNOSIS — S62.102A LEFT WRIST FRACTURE, CLOSED, INITIAL ENCOUNTER: ICD-10-CM

## 2024-12-23 DIAGNOSIS — S53.105D ELBOW DISLOCATION, LEFT, SUBSEQUENT ENCOUNTER: ICD-10-CM

## 2024-12-26 ENCOUNTER — APPOINTMENT (OUTPATIENT)
Dept: OCCUPATIONAL THERAPY | Facility: CLINIC | Age: 74
End: 2024-12-26
Payer: COMMERCIAL

## 2024-12-26 ENCOUNTER — APPOINTMENT (OUTPATIENT)
Dept: PHYSICAL THERAPY | Facility: CLINIC | Age: 74
End: 2024-12-26
Payer: COMMERCIAL

## 2024-12-26 DIAGNOSIS — M25.612 DECREASED SHOULDER MOBILITY, LEFT: Primary | ICD-10-CM

## 2024-12-26 DIAGNOSIS — M75.02 ADHESIVE CAPSULITIS OF LEFT SHOULDER: ICD-10-CM

## 2024-12-30 ENCOUNTER — TREATMENT (OUTPATIENT)
Dept: OCCUPATIONAL THERAPY | Facility: CLINIC | Age: 74
End: 2024-12-30
Payer: COMMERCIAL

## 2024-12-30 ENCOUNTER — TREATMENT (OUTPATIENT)
Dept: PHYSICAL THERAPY | Facility: CLINIC | Age: 74
End: 2024-12-30
Payer: COMMERCIAL

## 2024-12-30 DIAGNOSIS — M25.612 DECREASED SHOULDER MOBILITY, LEFT: Primary | ICD-10-CM

## 2024-12-30 DIAGNOSIS — S62.102A LEFT WRIST FRACTURE, CLOSED, INITIAL ENCOUNTER: ICD-10-CM

## 2024-12-30 DIAGNOSIS — S53.105D ELBOW DISLOCATION, LEFT, SUBSEQUENT ENCOUNTER: ICD-10-CM

## 2024-12-30 DIAGNOSIS — M75.02 ADHESIVE CAPSULITIS OF LEFT SHOULDER: ICD-10-CM

## 2024-12-30 PROCEDURE — 97110 THERAPEUTIC EXERCISES: CPT | Mod: GP

## 2024-12-30 PROCEDURE — 97110 THERAPEUTIC EXERCISES: CPT | Mod: GO,CO

## 2024-12-30 PROCEDURE — 97530 THERAPEUTIC ACTIVITIES: CPT | Mod: GO,CO

## 2024-12-30 ASSESSMENT — PAIN - FUNCTIONAL ASSESSMENT
PAIN_FUNCTIONAL_ASSESSMENT: 0-10
PAIN_FUNCTIONAL_ASSESSMENT: 0-10

## 2024-12-30 ASSESSMENT — PAIN SCALES - GENERAL
PAINLEVEL_OUTOF10: 1
PAINLEVEL_OUTOF10: 4

## 2024-12-30 ASSESSMENT — PAIN DESCRIPTION - DESCRIPTORS: DESCRIPTORS: ACHING

## 2024-12-30 NOTE — PROGRESS NOTES
"Physical Therapy Treatment    Patient Name: Dunia Moreau  MRN: [id number]  : 1950   Russell Lowe  Today's Date: 2024  Time Calculation  Start Time: 912  Stop Time: 954  Time Calculation (min): 42 min  PT Therapeutic Procedures Time Entry  Therapeutic Exercise Time Entry: 39           General  Reason for Referral: L Shoulder adhesive capsilitis  Referred By: Russell Lowe  General Comment: Visit  POC  Visit #4     Current Problem  Problem List Items Addressed This Visit             ICD-10-CM       Musculoskeletal and Injuries    Adhesive capsulitis of left shoulder M75.02    Decreased shoulder mobility, left - Primary M25.612            Subjective   Pt reports that her pain will increase with the more she uses her shoulder. Pt says that some of the HEP will increase her pain depending on how much she has done that day already. Pt she is looking to be able to get back to work but has not been cleared by doctor yet.     Pt. Reports compliance with HEP.    Precautions  Precautions  Precautions Comment: osteopenia, fx L ulnar/radius, wrist    Pain  Pain Assessment: 0-10  0-10 (Numeric) Pain Score: 4  Pain Location: Shoulder  Pain Orientation: Left    Objective   Pt with full Flexion ROM while on pulley           Last Session:  UBE x3' Fwd/x3' Bkwd   Pulley into flexion x3' 5\" hold   Standing Rows 2x10 Purple Tube   Standing GH extension 2x10 Purple Tube   Standing ER x15 L Orange Tband   Standing IR x15 L Mint green Tband   Finger ladder x10 L to 18 Flexion   Shoulder ER isometric 5\" hold x10  Standing isometric Flexion 5\" x10 (P, reps)   Supine AAROM Bench press 2x10 (N)  Supine AAROM OH flexion 2x10 (N)  Scap retractions 3\" hold x10  Treatments:    Therapeutic exercise  UBE 3' fwd/bkw (HEP and tissue extensibility)   Pulley into flexion x3'  Standing rows with purple tube 2x15  Standing Ext with purple tube 2x15  Standing L ER with orange band 2x15  Standing L IR with mint band 2x15  Supine L ABCs x1 " "(HEP)  S/L L Shoulder ABD 2x10 (HEP)  Ball on wall L Flex/ext, Lat, Clockwise, Counterclockwise x30\" each  Shoulder Ext with dowel franklin 2x10      Plan for next visit: Recheck    Current HEP:   Access Code: [email or web address]  URL: [email or web address]  Date: 11/26/2024  Prepared by: Ravindra Pegler    Exercises  - Seated Scapular Retraction  - 1 x daily - 7 x weekly - 3 sets - 10 reps - 3s hold  - Standing Isometric Shoulder External Rotation with Doorway  - 1 x daily - 7 x weekly - 3 sets - 10 reps - 5s hold  - Seated Shoulder Flexion AAROM with Pulley Behind  - 1 x daily - 7 x weekly - 3 sets - 10 reps    Assessment:     Pt tolerated session well with minimal discomfort in shoulder with progressed exercises but did complain that her elbow was bothering her more than anything. Pt will be rechecked next session to determine next steps.     Plan:  OP PT Plan  Treatment/Interventions: Aquatic therapy, Biofeedback, Blood flow restriction therapy, Canalith repositioning, Cryotherapy, Dry needling, Education/ Instruction, Electrical stimulation, Hot pack, Iontophoresis, Laser, Manual therapy, Mechanical traction, Neuromuscular re-education, Therapeutic activities, Taping techniques, Therapeutic exercises, Ultrasound, Vasopneumatic device  PT Plan: Skilled PT  PT Frequency: 1 time per week  Duration: 6 weeks  Onset Date: 10/02/24  Rehab Potential: Good  Plan of Care Agreement: Patient    Goals:  Active       PT Problem       PT L Shoulder Goals       Start:  11/26/24    Expected End:  02/04/25       1. Pt will adhere to and complete HEP in order to improve functionality outside of the clinic. (2 weeks)    2.   Pt will report 1/10 pain when performing Lifting, Using arm at Shoulder level, Reaching, Shoulder elevation, and ADLs in order to improve quality of life, functional mobility, and maintain functional independence. (5-6 weeks)    3.   Pt will improve postural awareness in order to reduce reoccurrence of " impairments. (2-3 weeks)    4.   Pt will increase strength of LUE to  4+/5 in order to improve Lifting, Using arm at Shoulder level, Reaching, Shoulder elevation, and ADLs. (3-4 weeks)    5.   Pt will improve ROM of L shoulder to match R shoulder in order to improve Lifting, Using arm at Shoulder level, Reaching, Shoulder elevation, and ADLs. (4-5 weeks)    6.  Pt goal: Reduce pain and regain motion     7. Pt will improve QDASH by 13% (MCID) to show reduction in disability and improvement in functionality. (5-6 weeks)

## 2024-12-30 NOTE — PROGRESS NOTES
"Occupational Therapy    Occupational Therapy Treatment    Name: Dunia Moreau \"Erin\"  MRN: [id number]  : 1950  Date: 24    Time Entry:  Time Calculation  Start Time: 954  Stop Time:   Time Calculation (min): 45 min        OT Therapeutic Procedures Time Entry  Therapeutic Activity Time Entry: 15  Therapeutic Exercise Time Entry: 30                Assessment: Patient demos understanding of exercises provided this date. Some fatigue reported in thumb and forearm with exercises.    Plan: Continue with current POC towards OT goals  OT Plan: TE, TA, manual, modalities  Duration: 8 weeks  Onset Date: 24  Rehab Potential: Good  Plan of Care Agreement: Patient    Subjective   General: Patient states she is doing okay, states she is supposed to return to work this 2025, however has not talked to Dr. CRISTOBAL prior to OT    General  Reason for Referral: L wrist fx  Referred By: Russell Lowe  General Comment:     Pain Assessment:  Pain Assessment  Pain Assessment: 0-10  0-10 (Numeric) Pain Score: 1  Pain Location: Elbow  Pain Orientation: Left  Pain Descriptors: Aching  Patient's Stated Pain Goal: No pain    Objective        Therapy/Activity: Therapeutic Exercise  Therapeutic Exercise Performed: Yes  Therapeutic Exercise Activity 1: Initiated T putty exercises w/ pink T putty and HEP provided ([email or web address]    Access Code: [email or web address])  Therapeutic Exercise Activity 2: Initiated Isometric stretches, RD/UD/Flex/Ext/Sup/pro    Therapeutic Activity  Therapeutic Activity Performed: Yes  Therapeutic Activity 1: L hand stacking/unstacking cones sup/pro x 10 cones x 3 trials  Therapeutic Activity 2: Educated on heat/ice for pain        Goals:  Active       OT Goals       LTG - Patient will indicate/ demonstrate the ability to resume all preinjury ADLs and IADLs without significant limits secondary to decreased ROM, decreased strength and/or pain as indicated by " Quickdash score of less than 40%.        Start:  11/06/24    Expected End:  01/29/25            Develop and issue HEP to help maximize ROM, strength and tolerance to help maximize return to all pre-onset activities.        Start:  11/06/24    Expected End:  01/29/25            Pain to be less than or equal to 1/10 with greater than or equal to 45 minutes activity.        Start:  11/06/24    Expected End:  01/29/25            Patient will demonstrate a progressive increase in ROM as appropriate with R digits and wrist to be within 5-10 degrees of L digits and wrist to help patient resume normal ADL and IADL function.        Start:  11/06/24    Expected End:  01/29/25            Pt will demonstrate increased  strength as appropriate with the R  to be greater than or equal to 80% of the L  to help patient resume ADLs and IADLs.        Start:  11/06/24    Expected End:  01/29/25

## 2025-01-06 ENCOUNTER — DOCUMENTATION (OUTPATIENT)
Dept: OCCUPATIONAL THERAPY | Facility: CLINIC | Age: 75
End: 2025-01-06
Payer: COMMERCIAL

## 2025-01-06 ENCOUNTER — APPOINTMENT (OUTPATIENT)
Dept: OCCUPATIONAL THERAPY | Facility: CLINIC | Age: 75
End: 2025-01-06
Payer: COMMERCIAL

## 2025-01-06 ENCOUNTER — APPOINTMENT (OUTPATIENT)
Dept: PHYSICAL THERAPY | Facility: CLINIC | Age: 75
End: 2025-01-06
Payer: COMMERCIAL

## 2025-01-06 ENCOUNTER — DOCUMENTATION (OUTPATIENT)
Dept: PHYSICAL THERAPY | Facility: CLINIC | Age: 75
End: 2025-01-06
Payer: COMMERCIAL

## 2025-01-06 DIAGNOSIS — M25.612 DECREASED SHOULDER MOBILITY, LEFT: Primary | ICD-10-CM

## 2025-01-06 NOTE — PROGRESS NOTES
"Occupational Therapy                 Therapy Communication Note    Patient Name: Dunia Moreau \"Erin\"  MRN: [id number]  Today's Date: 1/6/2025     Discipline: Occupational Therapy    Missed Visit Reason:  pt cancelled due to weather. Pt will need to make a new apt for recheck.     Missed Time: Cancel    "

## 2025-01-06 NOTE — PROGRESS NOTES
"Physical Therapy                 Therapy Communication Note    Patient Name: Dunia Moreau \"Erin\"  MRN: [id number]  Department:   Room: Room/bed info not found  Today's Date: 1/6/2025     Discipline: Physical Therapy          Missed Visit Reason:  Weather    Missed Time: Cancel    Comment:  "

## 2025-01-20 ENCOUNTER — APPOINTMENT (OUTPATIENT)
Dept: OCCUPATIONAL THERAPY | Facility: CLINIC | Age: 75
End: 2025-01-20
Payer: COMMERCIAL

## 2025-01-20 ENCOUNTER — APPOINTMENT (OUTPATIENT)
Dept: PHYSICAL THERAPY | Facility: CLINIC | Age: 75
End: 2025-01-20
Payer: COMMERCIAL

## 2025-01-20 DIAGNOSIS — M25.612 DECREASED SHOULDER MOBILITY, LEFT: Primary | ICD-10-CM

## 2025-01-23 ENCOUNTER — TREATMENT (OUTPATIENT)
Dept: PHYSICAL THERAPY | Facility: CLINIC | Age: 75
End: 2025-01-23
Payer: COMMERCIAL

## 2025-01-23 ENCOUNTER — APPOINTMENT (OUTPATIENT)
Dept: PRIMARY CARE | Facility: CLINIC | Age: 75
End: 2025-01-23
Payer: COMMERCIAL

## 2025-01-23 ENCOUNTER — TREATMENT (OUTPATIENT)
Dept: OCCUPATIONAL THERAPY | Facility: CLINIC | Age: 75
End: 2025-01-23
Payer: COMMERCIAL

## 2025-01-23 VITALS
BODY MASS INDEX: 36.19 KG/M2 | OXYGEN SATURATION: 94 % | HEIGHT: 64 IN | WEIGHT: 212 LBS | SYSTOLIC BLOOD PRESSURE: 128 MMHG | HEART RATE: 69 BPM | DIASTOLIC BLOOD PRESSURE: 80 MMHG

## 2025-01-23 DIAGNOSIS — E66.812 CLASS 2 OBESITY DUE TO EXCESS CALORIES WITHOUT SERIOUS COMORBIDITY WITH BODY MASS INDEX (BMI) OF 36.0 TO 36.9 IN ADULT: ICD-10-CM

## 2025-01-23 DIAGNOSIS — K21.9 GASTROESOPHAGEAL REFLUX DISEASE WITHOUT ESOPHAGITIS: ICD-10-CM

## 2025-01-23 DIAGNOSIS — E03.9 ACQUIRED HYPOTHYROIDISM: Primary | ICD-10-CM

## 2025-01-23 DIAGNOSIS — E66.09 CLASS 2 OBESITY DUE TO EXCESS CALORIES WITHOUT SERIOUS COMORBIDITY WITH BODY MASS INDEX (BMI) OF 36.0 TO 36.9 IN ADULT: ICD-10-CM

## 2025-01-23 DIAGNOSIS — E78.2 MIXED HYPERLIPIDEMIA: ICD-10-CM

## 2025-01-23 DIAGNOSIS — M75.02 ADHESIVE CAPSULITIS OF LEFT SHOULDER: ICD-10-CM

## 2025-01-23 DIAGNOSIS — M25.612 DECREASED SHOULDER MOBILITY, LEFT: Primary | ICD-10-CM

## 2025-01-23 DIAGNOSIS — F41.8 DEPRESSION WITH ANXIETY: ICD-10-CM

## 2025-01-23 DIAGNOSIS — S62.102A LEFT WRIST FRACTURE, CLOSED, INITIAL ENCOUNTER: Primary | ICD-10-CM

## 2025-01-23 DIAGNOSIS — S53.105D ELBOW DISLOCATION, LEFT, SUBSEQUENT ENCOUNTER: ICD-10-CM

## 2025-01-23 PROCEDURE — 97110 THERAPEUTIC EXERCISES: CPT | Mod: GP

## 2025-01-23 PROCEDURE — 99214 OFFICE O/P EST MOD 30 MIN: CPT

## 2025-01-23 PROCEDURE — 1157F ADVNC CARE PLAN IN RCRD: CPT

## 2025-01-23 PROCEDURE — 97530 THERAPEUTIC ACTIVITIES: CPT | Mod: GO

## 2025-01-23 PROCEDURE — 1159F MED LIST DOCD IN RCRD: CPT

## 2025-01-23 PROCEDURE — 1036F TOBACCO NON-USER: CPT

## 2025-01-23 ASSESSMENT — ENCOUNTER SYMPTOMS
GASTROINTESTINAL NEGATIVE: 1
CONSTITUTIONAL NEGATIVE: 1
RESPIRATORY NEGATIVE: 1
CARDIOVASCULAR NEGATIVE: 1

## 2025-01-23 ASSESSMENT — PAIN DESCRIPTION - DESCRIPTORS
DESCRIPTORS: ACHING;DISCOMFORT;DULL
DESCRIPTORS: ACHING;DISCOMFORT;DULL

## 2025-01-23 ASSESSMENT — PATIENT HEALTH QUESTIONNAIRE - PHQ9
1. LITTLE INTEREST OR PLEASURE IN DOING THINGS: NOT AT ALL
SUM OF ALL RESPONSES TO PHQ9 QUESTIONS 1 AND 2: 0
2. FEELING DOWN, DEPRESSED OR HOPELESS: NOT AT ALL

## 2025-01-23 ASSESSMENT — PAIN SCALES - GENERAL
PAINLEVEL_OUTOF10: 2
PAINLEVEL_OUTOF10: 7
PAINLEVEL_OUTOF10: 3

## 2025-01-23 ASSESSMENT — PAIN - FUNCTIONAL ASSESSMENT
PAIN_FUNCTIONAL_ASSESSMENT: 0-10
PAIN_FUNCTIONAL_ASSESSMENT: 0-10

## 2025-01-23 NOTE — PROGRESS NOTES
"Subjective   Patient ID: Erin Moreau is a 74 y.o. female who presents for pt here for 6 month med check  (Pt would like left foot evaluated due to poor circulation ).    HPI   HYPOTHYROID: Compliant with levo 50mcg daily, no SE.  ALLERGIES: Stable on Flonase.  ANXIETY/DEPRESSION: Stable on Paxil, no SE.  GERD: Stable on Protonix, no SE. Following with Dr. Nunez.  INCONTINENCE: Urinary, urgency, frequency. No blood in urine. Would not like medication at this time.  HYPERLIPIDEMIA: SE to many statins in the past.     Mammo good 2024, due 8/2025.  Colonoscopy 2023, due 2033.  DEXA 2024 showed osteopenia, taking vit D/calcium daily.    Review of Systems   Constitutional: Negative.    Respiratory: Negative.     Cardiovascular: Negative.    Gastrointestinal: Negative.        Objective   /80   Pulse 69   Ht 1.626 m (5' 4\")   Wt 96.2 kg (212 lb)   SpO2 94%   BMI 36.39 kg/m²     Physical Exam  Constitutional:       General: She is not in acute distress.     Appearance: Normal appearance. She is not ill-appearing.   HENT:      Head: Normocephalic and atraumatic.   Eyes:      Extraocular Movements: Extraocular movements intact.      Conjunctiva/sclera: Conjunctivae normal.   Cardiovascular:      Rate and Rhythm: Normal rate.   Pulmonary:      Effort: Pulmonary effort is normal.   Abdominal:      General: There is no distension.   Musculoskeletal:         General: Normal range of motion.      Cervical back: Normal range of motion.   Skin:     General: Skin is warm and dry.   Neurological:      General: No focal deficit present.      Mental Status: She is alert and oriented to person, place, and time.   Psychiatric:         Mood and Affect: Mood normal.         Behavior: Behavior normal.         Thought Content: Thought content normal.         Judgment: Judgment normal.         Assessment/Plan        Never got labs done.  Chronic conditions seem stable.  No medication changes today.  Fasting labs soon.  Follow up " 6 months or sooner prn.

## 2025-01-23 NOTE — PROGRESS NOTES
Physical Therapy Treatment    Patient Name: Dunia Moreau  MRN: [id number]  : 1950   Russell Lowe  Today's Date: 2025  Time Calculation  Start Time: 1241  Stop Time: 1307  Time Calculation (min): 26 min  PT Therapeutic Procedures Time Entry  Therapeutic Exercise Time Entry: 24           General  Reason for Referral: L Shoulder adhesive capsilitis  Referred By: Russell Lowe  General Comment: Visit  POC  Visit #5     Current Problem  Problem List Items Addressed This Visit             ICD-10-CM       Musculoskeletal and Injuries    Adhesive capsulitis of left shoulder M75.02    Decreased shoulder mobility, left - Primary M25.612            Subjective   Pt reports that her shoulder pain is rated as a 7/10 and that she has been back to work but that she is only able to work with the groceries coming to her R. Pt has been able to complete HEP without any issues.      Pt. Reports compliance with HEP.    Precautions  Precautions  Precautions Comment: osteopenia, fx L ulnar/radius, wrist    Pain  Pain Assessment: 0-10  0-10 (Numeric) Pain Score: 7  Pain Location: Shoulder  Pain Orientation: Left    Objective   MMT 5/5 max  LEFT RIGHT   Shoulder Flexion 5, P! 5   Shoulder Abduction 5, P! 4+   Shoulder ER 5 5   Shoulder IR 5 5   Elbow Flexion     Elbow Extension     Mid Traps     Rhomboids     Lower Traps       Upper Extremity ROM:   UE ROM not listed below is WNL    Shoulder  Flexion AROM: Left 154, P!  Right 154  FlexionPROM: Left nt  Right nt  ABD AROM: Left 148, P!                          Right 150  ABD PROM: Left nt                          Right nt  ER PROM: nt  IR PROM: nt  Functional ER: Left T2                      Right T2  Functional IR: Left L4 ,P!                       Right T10       Outcome Measures:  Other Measures  Disability of Arm Shoulder Hand (DASH): 23        Treatments:    Therapeutic exercise  UBE 3' fwd/bkw (HEP and tissue extensibility)   Recheck   Shoulder IR and ext with dowel franklin  x10      Current HEP:   Access Code: [email or web address]  URL: [email or web address]  Date: 01/23/2025  Prepared by: Ravindra Fernández    Exercises  - Standing Isometric Shoulder External Rotation with Doorway  - 1 x daily - 7 x weekly - 3 sets - 10 reps - 5s hold  - Seated Shoulder Flexion AAROM with Pulley Behind  - 1 x daily - 7 x weekly - 3 sets - 10 reps  - Supine Shoulder Alphabet  - 1 x daily - 7 x weekly - 3 sets - 10 reps  - Sidelying Shoulder Abduction Palm Forward  - 1 x daily - 7 x weekly - 3 sets - 10 reps  - Standing Shoulder Row with Anchored Resistance  - 1 x daily - 7 x weekly - 3 sets - 10 reps  - Standing Bilateral Shoulder Internal Rotation AAROM with Dowel  - 1 x daily - 7 x weekly - 3 sets - 10 reps    Assessment:     Pt has made improvements in strength, Rom, and functional mobility but is still having resting pain. Pt has Met or partially met goals at this time. Pt educated on importance of continuing HEP to maintain gains made during therapy and to contact if any questions or concerns arise, Pt verbalizes understanding at this time. This note will serve a discharge note due to plateau of progress and accomplishment of goals, pt will follow up with MD as needed, pt in agreement.       Plan:  OP PT Plan  PT Plan: No Additional PT interventions required at this time  Onset Date: 10/02/24  Rehab Potential: Good  Plan of Care Agreement: Patient    Goals:  Active       PT Problem       PT L Shoulder Goals       Start:  11/26/24    Expected End:  02/04/25       1. Pt will adhere to and complete HEP in order to improve functionality outside of the clinic. (2 weeks) 1/23: Goal met    2.   Pt will report 1/10 pain when performing Lifting, Using arm at Shoulder level, Reaching, Shoulder elevation, and ADLs in order to improve quality of life, functional mobility, and maintain functional independence. (5-6 weeks) 1/23: Goal not met, pt with resting pain of 7/10 and will increase with shoulder  mobility     3.   Pt will improve postural awareness in order to reduce reoccurrence of impairments. (2-3 weeks) 1/23: Goal met    4.   Pt will increase strength of LUE to  4+/5 in order to improve Lifting, Using arm at Shoulder level, Reaching, Shoulder elevation, and ADLs. (3-4 weeks) 1/23: Goal met, pt with 5/5 LUE MMTs    5.   Pt will improve ROM of L shoulder to match R shoulder in order to improve Lifting, Using arm at Shoulder level, Reaching, Shoulder elevation, and ADLs. (4-5 weeks) 1/23: Goal mostly met, pt still lacking some IR compared to R side    6.  Pt goal: Reduce pain and regain motion     7. Pt will improve QDASH by 13% (MCID) to show reduction in disability and improvement in functionality. (5-6 weeks) 1/23: Goal met, pt with score of 27.27% from 72%

## 2025-01-23 NOTE — PROGRESS NOTES
"Occupational Therapy    OT Recheck     Patient Name: Dunia Moreau \"Erin\"  MRN: [id number]  Today's Date: 1/23/2025     Time Calculation  Start Time: 1315  Stop Time: 1358  Time Calculation (min): 43 min    Visit Number: 7  Therapeutic Procedures:      OT Therapeutic Procedures Time Entry  Therapeutic Activity Time Entry: 43                         Current Problem:  1. Left wrist fracture, closed, initial encounter  Follow Up In Occupational Therapy    Follow Up In Occupational Therapy      2. Elbow dislocation, left, subsequent encounter  Follow Up In Occupational Therapy    Follow Up In Occupational Therapy          Subjective     General: Pt reported that she is back to work and it's going okay and that her LUE aches. Pt reported edema in her LUE hand. 2/20/2025 pt will see ortho again. Pt was DC from PT today.     Reason for Referral: L wrist fx  Referred By: Russell Lowe  General Comment: 6/20  Pain:  Pain Assessment  Pain Assessment: 0-10  0-10 (Numeric) Pain Score: 2  Pain Location: Hand  Pain Orientation: Left  Pain Descriptors: Aching, Discomfort, Dull  Patient's Stated Pain Goal: No pain  Multiple Pain Sites: Two  Pain 2  Pain Score 2: 3  Pain Location 2: Elbow  Pain Descriptors 2: Aching, Discomfort, Dull    Objective       Therapy/Activity:      Therapeutic Activity  Therapeutic Activity 1: Assessed ROM and strength          OP EDUCATION:   Con with HEP     Assessment:  OT assessed ROM and strength, pt showed deficits in pain management and strength. Pt would benefit from skilled OT to address deficits.       1/23/2025  Wrist Measurements:  WFL unless documented below   Flexion  Extension Radial Dev Ulnar Dev   Right 70 80 15 35   Left 85 65 25 40     Elbow ROM:  WFL unless documented below   Flexion Extension  Supination Pronation   Right 145 0 90 90   Left 150 +5 85 85     Digit Measurements:  WFL unless documented below   MCP PIP DIP DPC   Thumb 25 75 NA 0   Index 80 (-20) 85 (+3) 50(0) 2.5   Long 80 " (-10) 90(0) 60(0) 1   Ring 80 (-10) 90(0) 60(-8) 1   Small 80 (-10) 90(0) 60(0) 1     :  Average taken from best 3 measurements.   Trials Average   RUE 40/40/40 40   LUE 15,15,15 15     Lateral Pinch:   Trials Average   RUE 10/10/10 10   LUE 4/4/4 4     Three Point Pinch:   Trials Average   RUE 8/7/7 7.3   LUE 4/4/4 4       Quickdash Scores:23; 27.27%     11/06/2024   Wrist Measurements:  WFL unless documented below    Flexion  Extension Radial Dev Ulnar Dev Supination Pronation   Right 75 80 22 35 90 80   Left 55 25 7 35 85 80      Elbow ROM:  WFL unless documented below    Flexion Extension    Right 40 180   Left 60 155      Digit Measurements:  WFL unless documented below    MCP PIP DIP DPC   Thumb 25(-5) 60(-3) NA 3   Index 50(+10) 50(-3) 20(0) 5   Long 60(+12) 65(-3) 25(0) 5   Ring 60(+20) 75(0) 25(+3) 4.5   Small 65(+30) 50((-10) 35(-10) 4         Sensation:   Pt reported some numbness in the LMF.   Outcome Measures:   Quickdash Scores: 50; 88.64%      Plan:  CONT tx 2 times per week 4 weeks   OT Plan: TE, TA, manual, modalities  Duration: 8 weeks  Onset Date: 11/06/24  Rehab Potential: Good  Plan of Care Agreement: Patient  Goals:  Active       OT Goals       LTG - Patient will indicate/ demonstrate the ability to resume all preinjury ADLs and IADLs without significant limits secondary to decreased ROM, decreased strength and/or pain as indicated by Quickdash score of less than 40%.        Start:  11/06/24    Expected End:  01/29/25            Develop and issue HEP to help maximize ROM, strength and tolerance to help maximize return to all pre-onset activities.        Start:  11/06/24    Expected End:  01/29/25            Pain to be less than or equal to 1/10 with greater than or equal to 45 minutes activity.        Start:  11/06/24    Expected End:  01/29/25            Patient will demonstrate a progressive increase in ROM as appropriate with R digits and wrist to be within 5-10 degrees of L digits and  wrist to help patient resume normal ADL and IADL function.        Start:  11/06/24    Expected End:  01/29/25            Pt will demonstrate increased  strength as appropriate with the R  to be greater than or equal to 80% of the L  to help patient resume ADLs and IADLs.        Start:  11/06/24    Expected End:  01/29/25

## 2025-01-29 ENCOUNTER — TREATMENT (OUTPATIENT)
Dept: OCCUPATIONAL THERAPY | Facility: CLINIC | Age: 75
End: 2025-01-29
Payer: COMMERCIAL

## 2025-01-29 DIAGNOSIS — S53.105D ELBOW DISLOCATION, LEFT, SUBSEQUENT ENCOUNTER: ICD-10-CM

## 2025-01-29 DIAGNOSIS — S62.102A LEFT WRIST FRACTURE, CLOSED, INITIAL ENCOUNTER: ICD-10-CM

## 2025-01-29 PROCEDURE — 97530 THERAPEUTIC ACTIVITIES: CPT | Mod: GO,CO

## 2025-01-29 PROCEDURE — 97140 MANUAL THERAPY 1/> REGIONS: CPT | Mod: GO,CO

## 2025-01-29 ASSESSMENT — PAIN SCALES - GENERAL
PAINLEVEL_OUTOF10: 0 - NO PAIN
PAINLEVEL_OUTOF10: 2

## 2025-01-29 ASSESSMENT — PAIN DESCRIPTION - DESCRIPTORS: DESCRIPTORS: DULL

## 2025-01-29 ASSESSMENT — PAIN - FUNCTIONAL ASSESSMENT: PAIN_FUNCTIONAL_ASSESSMENT: 0-10

## 2025-01-29 NOTE — PROGRESS NOTES
"Occupational Therapy    Occupational Therapy Treatment    Name: Dunia Moreau \"Erin\"  MRN: [id number]  : 1950  Date: 25    Time Entry:  Time Calculation  Start Time: 1000  Stop Time: 1042  Time Calculation (min): 42 min        OT Therapeutic Procedures Time Entry  Manual Therapy Time Entry: 15  Therapeutic Activity Time Entry: 27                Assessment: Some increased pain reported in D2/3 with PROM, patient demos increased composite fist post paraffin and stretching. Verbal cues to keep elbow down on Velcro dowel d/t overcompensation.     Plan: Continue with current POC towards OT goals  OT Plan: TE, TA, manual, modalities  Duration: 8 weeks  Onset Date: 24  Rehab Potential: Good  Plan of Care Agreement: Patient    Subjective   General: Patient states she is doing okay states she is frustrated with being unable to make a full fist with L hand.    General  Reason for Referral: L wrist fx  Referred By: Russell Lowe  General Comment:     Pain Assessment:  Pain Assessment  Pain Assessment: 0-10  0-10 (Numeric) Pain Score: 2  Pain Location: Hand  Pain Orientation: Left  Pain Descriptors: Dull  Patient's Stated Pain Goal: No pain  Pain 2  Pain Score 2: 0 - No pain  Pain Location 2: Elbow    Objective      Modalities:  Modalities Used: Yes  Modality 1: Untimed Other: (comment) (L hand dipped in paraffin x 6 dips)    Therapy/Activity:   Therapeutic Activity  Therapeutic Activity Performed: Yes  Therapeutic Activity 1: L hand on large orange tongs picking up 1\" blocks to place into bucket in supination x 30  Therapeutic Activity 2: L hand on large Velcro dowel flex/ext/sup/pro x 8 trials each  Therapeutic Activity 3: Education on heat and ice for pain    Manual Therapy  Manual Therapy Performed: Yes  Manual Therapy Activity 1: PROM to hand/wrist with paraffin donned  Manual Therapy Activity 2: STM to L hand/wrist      Goals:  Active       OT Goals       LTG - Patient will indicate/ demonstrate " the ability to resume all preinjury ADLs and IADLs without significant limits secondary to decreased ROM, decreased strength and/or pain as indicated by Quickdash score of less than 40%.        Start:  11/06/24    Expected End:  01/29/25            Develop and issue HEP to help maximize ROM, strength and tolerance to help maximize return to all pre-onset activities.        Start:  11/06/24    Expected End:  01/29/25            Pain to be less than or equal to 1/10 with greater than or equal to 45 minutes activity.        Start:  11/06/24    Expected End:  01/29/25            Patient will demonstrate a progressive increase in ROM as appropriate with R digits and wrist to be within 5-10 degrees of L digits and wrist to help patient resume normal ADL and IADL function.        Start:  11/06/24    Expected End:  01/29/25            Pt will demonstrate increased  strength as appropriate with the R  to be greater than or equal to 80% of the L  to help patient resume ADLs and IADLs.        Start:  11/06/24    Expected End:  01/29/25

## 2025-01-30 ENCOUNTER — TREATMENT (OUTPATIENT)
Dept: OCCUPATIONAL THERAPY | Facility: CLINIC | Age: 75
End: 2025-01-30
Payer: COMMERCIAL

## 2025-01-30 DIAGNOSIS — S53.105D ELBOW DISLOCATION, LEFT, SUBSEQUENT ENCOUNTER: ICD-10-CM

## 2025-01-30 DIAGNOSIS — S62.102A LEFT WRIST FRACTURE, CLOSED, INITIAL ENCOUNTER: ICD-10-CM

## 2025-01-30 PROCEDURE — 97530 THERAPEUTIC ACTIVITIES: CPT | Mod: GO

## 2025-01-30 PROCEDURE — 97140 MANUAL THERAPY 1/> REGIONS: CPT | Mod: GO

## 2025-01-30 ASSESSMENT — PAIN - FUNCTIONAL ASSESSMENT: PAIN_FUNCTIONAL_ASSESSMENT: 0-10

## 2025-01-30 ASSESSMENT — PAIN SCALES - GENERAL: PAINLEVEL_OUTOF10: 4

## 2025-01-30 NOTE — PROGRESS NOTES
"Occupational Therapy    OT Treatment    Patient Name: Dunia Moreau \"Erin\"  MRN: [id number]  Today's Date: 1/30/2025     Time Calculation  Start Time: 1010  Stop Time: 1053  Time Calculation (min): 43 min    Visit Number: 9  Therapeutic Procedures:      OT Therapeutic Procedures Time Entry  Manual Therapy Time Entry: 24  Therapeutic Activity Time Entry: 19                         Current Problem:  1. Left wrist fracture, closed, initial encounter  Follow Up In Occupational Therapy      2. Elbow dislocation, left, subsequent encounter  Follow Up In Occupational Therapy          Subjective     General: Patient is not doing HEP everyday. Pt reported that she can't do rubber band HEP for thumb       Reason for Referral: L wrist fx  Referred By: Russell Lowe  General Comment: 8/20  Pain:  Pain Assessment  Pain Assessment: 0-10  0-10 (Numeric) Pain Score: 4  Pain Location: Hand  Pain Orientation: Left  Patient's Stated Pain Goal: No pain    Objective       Therapy/Activity:   Modalities  Modalities Used: Yes  Modality 1: Untimed Other: (comment) (L hand dipped in paraffin x 6 dips)  Therapeutic Activity  Therapeutic Activity Performed: Yes  Therapeutic Activity 1: donning and doffing squigz x 24 x 2 sets  Therapeutic Activity 2: tennis ball with mouth picking marbles  Therapeutic Activity 3: Tweezer activity doffing magnetic stars  Manual Therapy  Manual Therapy Performed: Yes  Manual Therapy Activity 1: PROM to hand/wrist with paraffin donned  Manual Therapy Activity 2: STM to L hand/wrist  Manual Therapy Activity 3: finger blocking of each Digit joint x 5 reps  Therapeutic Exercise  Therapeutic Exercise Performed: Yes        OP EDUCATION:     Cont HEP   Assessment:   Pt participated in therapeutic exercises and activities as needed to increase Rom and strength. Pt tolerated parffin. Pt tolerated ROM and Finger blocking exercises to increase ROM in digits. Pt tolerated exercise.      Plan:  OT Plan: [place], manual, " modalities  Duration: 8 weeks  Onset Date: 11/06/24  Rehab Potential: Good  Plan of Care Agreement: Patient  Goals:  Active       OT Goals       LTG - Patient will indicate/ demonstrate the ability to resume all preinjury ADLs and IADLs without significant limits secondary to decreased ROM, decreased strength and/or pain as indicated by Quickdash score of less than 40%.        Start:  11/06/24    Expected End:  01/29/25            Develop and issue HEP to help maximize ROM, strength and tolerance to help maximize return to all pre-onset activities.        Start:  11/06/24    Expected End:  01/29/25            Pain to be less than or equal to 1/10 with greater than or equal to 45 minutes activity.        Start:  11/06/24    Expected End:  01/29/25            Patient will demonstrate a progressive increase in ROM as appropriate with R digits and wrist to be within 5-10 degrees of L digits and wrist to help patient resume normal ADL and IADL function.        Start:  11/06/24    Expected End:  01/29/25            Pt will demonstrate increased  strength as appropriate with the R  to be greater than or equal to 80% of the L  to help patient resume ADLs and IADLs.        Start:  11/06/24    Expected End:  01/29/25

## 2025-02-05 ENCOUNTER — TREATMENT (OUTPATIENT)
Dept: OCCUPATIONAL THERAPY | Facility: CLINIC | Age: 75
End: 2025-02-05
Payer: COMMERCIAL

## 2025-02-05 DIAGNOSIS — S53.105D ELBOW DISLOCATION, LEFT, SUBSEQUENT ENCOUNTER: ICD-10-CM

## 2025-02-05 DIAGNOSIS — S62.102A LEFT WRIST FRACTURE, CLOSED, INITIAL ENCOUNTER: ICD-10-CM

## 2025-02-05 PROCEDURE — 97140 MANUAL THERAPY 1/> REGIONS: CPT | Mod: GO,CO

## 2025-02-05 PROCEDURE — 97530 THERAPEUTIC ACTIVITIES: CPT | Mod: GO,CO

## 2025-02-05 ASSESSMENT — PAIN - FUNCTIONAL ASSESSMENT: PAIN_FUNCTIONAL_ASSESSMENT: 0-10

## 2025-02-05 ASSESSMENT — PAIN SCALES - GENERAL
PAINLEVEL_OUTOF10: 2
PAINLEVEL_OUTOF10: 0 - NO PAIN

## 2025-02-05 ASSESSMENT — PAIN DESCRIPTION - DESCRIPTORS: DESCRIPTORS: DULL

## 2025-02-05 NOTE — PROGRESS NOTES
"Occupational Therapy    Occupational Therapy Treatment    Name: Dunia Moreau \"Erin\"  MRN: [id number]  : 1950  Date: 25    Time Entry:  Time Calculation  Start Time: 958  Stop Time:   Time Calculation (min): 42 min        OT Therapeutic Procedures Time Entry  Manual Therapy Time Entry: 24  Therapeutic Activity Time Entry: 18                Assessment: Patient tolerating manual and activities well this date, minimal increase in pain reported with last activity of picking up blocks states \"my fingers are cramping\" States Massage and paraffin made her hand \"feel really good\". Demos increased composite fist post stretching.    Plan: Continue with current POC towards OT goals  OT Plan: TE, TA, manual, modalities  Duration: 8 weeks  Onset Date: 24  Rehab Potential: Good  Plan of Care Agreement: Patient    Subjective   General: Patient states she is doing okay states her hand feels \"tight\"    General  Reason for Referral: L wrist fx  Referred By: Russell Lowe  General Comment:     Pain Assessment:  Pain Assessment  Pain Assessment: 0-10  0-10 (Numeric) Pain Score: 2  Pain Location: Hand  Pain Descriptors: Dull  Patient's Stated Pain Goal: No pain  Pain 2  Pain Score 2: 0 - No pain  Pain Location 2: Elbow    Objective      Modalities:  Modalities Used: Yes  Modality 1: Untimed Other: (comment) (L hand dipped in paraffin x 6 dips)    Therapy/Activity:   Therapeutic Activity  Therapeutic Activity Performed: Yes  Therapeutic Activity 1: L hand squeeze and release on small weighted ball x 15 w/ 5 second holds each  Therapeutic Activity 2: L hand on large orange tongs stacking 1\" blocks into towers x 10 blocks x 3 trials  Therapeutic Activity 3: L hand on large orange tongs picking up 1\" blocks to put away x 30 blocks    Manual Therapy  Manual Therapy Performed: Yes  Manual Therapy Activity 1: PROM to hand/wrist with paraffin donned  Manual Therapy Activity 2: STM to L hand/wrist  Manual Therapy " Activity 3: finger blocking of each Digit joint x 5 reps  Manual Therapy Activity 4: provided with and educated on massage tool for palmar tissue to increase ROm. x 5 minutes today      Goals:  Active       OT Goals       LTG - Patient will indicate/ demonstrate the ability to resume all preinjury ADLs and IADLs without significant limits secondary to decreased ROM, decreased strength and/or pain as indicated by Quickdash score of less than 40%.        Start:  11/06/24    Expected End:  01/29/25            Develop and issue HEP to help maximize ROM, strength and tolerance to help maximize return to all pre-onset activities.        Start:  11/06/24    Expected End:  01/29/25            Pain to be less than or equal to 1/10 with greater than or equal to 45 minutes activity.        Start:  11/06/24    Expected End:  01/29/25            Patient will demonstrate a progressive increase in ROM as appropriate with R digits and wrist to be within 5-10 degrees of L digits and wrist to help patient resume normal ADL and IADL function.        Start:  11/06/24    Expected End:  01/29/25            Pt will demonstrate increased  strength as appropriate with the R  to be greater than or equal to 80% of the L  to help patient resume ADLs and IADLs.        Start:  11/06/24    Expected End:  01/29/25

## 2025-02-06 ENCOUNTER — TREATMENT (OUTPATIENT)
Dept: OCCUPATIONAL THERAPY | Facility: CLINIC | Age: 75
End: 2025-02-06
Payer: COMMERCIAL

## 2025-02-06 DIAGNOSIS — M25.532 PAIN, WRIST, LEFT: Primary | ICD-10-CM

## 2025-02-06 DIAGNOSIS — S53.105D ELBOW DISLOCATION, LEFT, SUBSEQUENT ENCOUNTER: ICD-10-CM

## 2025-02-06 DIAGNOSIS — S62.102A LEFT WRIST FRACTURE, CLOSED, INITIAL ENCOUNTER: ICD-10-CM

## 2025-02-06 PROCEDURE — 97530 THERAPEUTIC ACTIVITIES: CPT | Mod: GO,CO

## 2025-02-06 ASSESSMENT — PAIN - FUNCTIONAL ASSESSMENT: PAIN_FUNCTIONAL_ASSESSMENT: 0-10

## 2025-02-06 ASSESSMENT — PAIN DESCRIPTION - DESCRIPTORS: DESCRIPTORS: DULL

## 2025-02-06 ASSESSMENT — PAIN SCALES - GENERAL
PAINLEVEL_OUTOF10: 0 - NO PAIN
PAINLEVEL_OUTOF10: 3

## 2025-02-06 NOTE — PROGRESS NOTES
"Occupational Therapy    Occupational Therapy Treatment    Name: Dunia Moreau \"Erin\"  MRN: [id number]  : 1950  Date: 25    Time Entry:  Time Calculation  Start Time: 1015  Stop Time: 1058  Time Calculation (min): 43 min        OT Therapeutic Procedures Time Entry  Therapeutic Activity Time Entry: 43                Assessment: Patient tolerating activities well, some fatigue and increased pain reported with MRMT board in thenar region extended time for completion.    Plan: Continue with current POC towards OT goals  OT Plan: TE, TA, manual, modalities  Duration: 8 weeks  Onset Date: 24  Rehab Potential: Good  Plan of Care Agreement: Patient    Subjective   General: Patient states she is a little sore today however doing \"okay\"    General  Reason for Referral: L wrist fx  Referred By: Russell Lowe  General Comment: 10/20    Pain Assessment:  Pain Assessment  Pain Assessment: 0-10  0-10 (Numeric) Pain Score: 3  Pain Location: Hand  Pain Descriptors: Dull  Patient's Stated Pain Goal: No pain  Pain 2  Pain Score 2: 0 - No pain  Pain Location 2: Elbow    Objective      Modalities:  Modalities Used: Yes  Modality 1: Untimed Other: (comment) (L hand dipped in paraffin x 6 dips)    Therapy/Activity:   Therapeutic Activity  Therapeutic Activity Performed: Yes  Therapeutic Activity 1: Patient interview with paraffin donned  Therapeutic Activity 2: L hand on short handle reacher flipping pegs on MRMT board x 60 pegs sup/pro  Therapeutic Activity 3: L hand alternating digits FMC on grooved pegboard x 25 pegs in/out    Goals:  Active       OT Goals       LTG - Patient will indicate/ demonstrate the ability to resume all preinjury ADLs and IADLs without significant limits secondary to decreased ROM, decreased strength and/or pain as indicated by Quickdash score of less than 40%.        Start:  24    Expected End:  25            Develop and issue HEP to help maximize ROM, strength and tolerance to " help maximize return to all pre-onset activities.        Start:  11/06/24    Expected End:  01/29/25            Pain to be less than or equal to 1/10 with greater than or equal to 45 minutes activity.        Start:  11/06/24    Expected End:  01/29/25            Patient will demonstrate a progressive increase in ROM as appropriate with R digits and wrist to be within 5-10 degrees of L digits and wrist to help patient resume normal ADL and IADL function.        Start:  11/06/24    Expected End:  01/29/25            Pt will demonstrate increased  strength as appropriate with the R  to be greater than or equal to 80% of the L  to help patient resume ADLs and IADLs.        Start:  11/06/24    Expected End:  01/29/25

## 2025-02-12 ENCOUNTER — TREATMENT (OUTPATIENT)
Dept: OCCUPATIONAL THERAPY | Facility: CLINIC | Age: 75
End: 2025-02-12
Payer: COMMERCIAL

## 2025-02-12 DIAGNOSIS — S53.105D ELBOW DISLOCATION, LEFT, SUBSEQUENT ENCOUNTER: ICD-10-CM

## 2025-02-12 DIAGNOSIS — S62.102A LEFT WRIST FRACTURE, CLOSED, INITIAL ENCOUNTER: ICD-10-CM

## 2025-02-12 PROCEDURE — 97140 MANUAL THERAPY 1/> REGIONS: CPT | Mod: GO,CO

## 2025-02-12 PROCEDURE — 97530 THERAPEUTIC ACTIVITIES: CPT | Mod: GO,CO

## 2025-02-12 ASSESSMENT — PAIN SCALES - GENERAL
PAINLEVEL_OUTOF10: 0 - NO PAIN
PAINLEVEL_OUTOF10: 4

## 2025-02-12 ASSESSMENT — PAIN DESCRIPTION - DESCRIPTORS: DESCRIPTORS: DULL;ACHING

## 2025-02-12 ASSESSMENT — PAIN - FUNCTIONAL ASSESSMENT: PAIN_FUNCTIONAL_ASSESSMENT: 0-10

## 2025-02-12 NOTE — PROGRESS NOTES
"Occupational Therapy    Occupational Therapy Treatment    Name: Dunia Moreau \"Erin\"  MRN: [id number]  : 1950  Date: 25    Time Entry:  Time Calculation  Start Time: 1015  Stop Time: 1055  Time Calculation (min): 40 min        OT Therapeutic Procedures Time Entry  Manual Therapy Time Entry: 10  Therapeutic Activity Time Entry: 30                Assessment: Patient c/o tightness and some increase in pain with PROM wrist flex/ext in CMC region. Demos understanding of continuing exercises at home.    Plan: Continue with current POC towards OT goals   OT Plan: TE, TA, manual, modalities  Duration: 8 weeks  Onset Date: 24  Rehab Potential: Good  Plan of Care Agreement: Patient    Subjective   General: Patient states her arm has been sore from working    General  Reason for Referral: L wrist fx  Referred By: Russell Lowe  General Comment:     Pain Assessment:  Pain Assessment  Pain Assessment: 0-10  0-10 (Numeric) Pain Score: 4  Pain Location: Hand  Pain Orientation: Left  Pain Descriptors: Dull, Aching  Patient's Stated Pain Goal: No pain  Pain 2  Pain Score 2: 0 - No pain  Pain Location 2: Elbow    Objective      Modalities:  Modalities Used: Yes  Modality 1: Untimed Other: (comment) (L hand dipped in paraffin x 6 dips)    Therapy/Activity:   Therapeutic Activity  Therapeutic Activity Performed: Yes  Therapeutic Activity 1: L hand on black resistive clip picking up poker chips to place into slotted container x 30 chips  Therapeutic Activity 2: L hand pulling squigs off of table x 44  Therapeutic Activity 3: L hand flattening out light blue putty on table top  Therapeutic Activity 4: L hand T pinch pn light blue T putty x 10 each    Manual Therapy  Manual Therapy Performed: Yes  Manual Therapy Activity 1: PROM to hand/wrist with paraffin donned  Manual Therapy Activity 2: STM to L hand/wrist      Goals:  Active       OT Goals       LTG - Patient will indicate/ demonstrate the ability to resume " all preinjury ADLs and IADLs without significant limits secondary to decreased ROM, decreased strength and/or pain as indicated by Quickdash score of less than 40%.        Start:  11/06/24    Expected End:  01/29/25            Develop and issue HEP to help maximize ROM, strength and tolerance to help maximize return to all pre-onset activities.        Start:  11/06/24    Expected End:  01/29/25            Pain to be less than or equal to 1/10 with greater than or equal to 45 minutes activity.        Start:  11/06/24    Expected End:  01/29/25            Patient will demonstrate a progressive increase in ROM as appropriate with R digits and wrist to be within 5-10 degrees of L digits and wrist to help patient resume normal ADL and IADL function.        Start:  11/06/24    Expected End:  01/29/25            Pt will demonstrate increased  strength as appropriate with the R  to be greater than or equal to 80% of the L  to help patient resume ADLs and IADLs.        Start:  11/06/24    Expected End:  01/29/25

## 2025-02-13 ENCOUNTER — TREATMENT (OUTPATIENT)
Dept: OCCUPATIONAL THERAPY | Facility: CLINIC | Age: 75
End: 2025-02-13
Payer: COMMERCIAL

## 2025-02-13 DIAGNOSIS — S62.102A LEFT WRIST FRACTURE, CLOSED, INITIAL ENCOUNTER: ICD-10-CM

## 2025-02-13 DIAGNOSIS — S53.105D ELBOW DISLOCATION, LEFT, SUBSEQUENT ENCOUNTER: ICD-10-CM

## 2025-02-13 PROCEDURE — 97530 THERAPEUTIC ACTIVITIES: CPT | Mod: GO,CO

## 2025-02-13 PROCEDURE — 97110 THERAPEUTIC EXERCISES: CPT | Mod: GO,CO

## 2025-02-13 ASSESSMENT — PAIN SCALES - GENERAL
PAINLEVEL_OUTOF10: 0 - NO PAIN
PAINLEVEL_OUTOF10: 0 - NO PAIN

## 2025-02-13 ASSESSMENT — PAIN - FUNCTIONAL ASSESSMENT: PAIN_FUNCTIONAL_ASSESSMENT: 0-10

## 2025-02-13 NOTE — PROGRESS NOTES
"Occupational Therapy    Occupational Therapy Treatment    Name: Dunia Moreau \"Erin\"  MRN: [id number]  : 1950  Date: 25    Time Entry:  Time Calculation  Start Time: 1015  Stop Time: 1056  Time Calculation (min): 41 min        OT Therapeutic Procedures Time Entry  Therapeutic Activity Time Entry: 17  Therapeutic Exercise Time Entry: 24                Assessment: Patient continues to demo progress towards goals, demos increased composite fist and strength, demos decreased pain with exercises. Demos understanding of continuing exercises at home.    Plan: Continue with current POC towards OT goals  OT Plan: TE, TA, manual, modalities  Duration: 8 weeks  Onset Date: 24  Rehab Potential: Good  Plan of Care Agreement: Patient    Subjective   General: Patient states she is doing well, states shoulder is a little sore, however states she can now make a full fist.    General  Reason for Referral: L wrist fx  Referred By: Russell Lowe  General Comment:   Precautions:  Precautions Comment: N/A  Pain Assessment:  Pain Assessment  Pain Assessment: 0-10  0-10 (Numeric) Pain Score: 0 - No pain  Pain Location: Wrist  Pain Orientation: Left  Patient's Stated Pain Goal: No pain  Pain 2  Pain Score 2: 0 - No pain  Pain Location 2: Elbow  Pain Orientation 2: Left    Objective      Modalities:  Modalities Used: Yes  Modality 1: Untimed Other: (comment) (L hand dipped in paraffin x 6 dips)    Therapy/Activity: Therapeutic Exercise  Therapeutic Exercise Performed: Yes  Therapeutic Exercise Activity 1: L hand 3lb DB over wedge wrist flex/ext 2x10  Therapeutic Exercise Activity 2: L hand 3lb DB wrist sup/pro 2x10  Therapeutic Exercise Activity 3: L hand red power web digit flex/ext x 15 each    Therapeutic Activity  Therapeutic Activity Performed: Yes  Therapeutic Activity 1: LUE unstacking/stacking cones sup/pro x 10 cones x 3 trials  Therapeutic Activity 2: L hand on juxacisor elbow at side x 5 " trials  Therapeutic Activity 3: NELI CARLIN over ball flex/ext/sup/pro x 10 each w/ 5 second holds      Goals:  Active       OT Goals       LTG - Patient will indicate/ demonstrate the ability to resume all preinjury ADLs and IADLs without significant limits secondary to decreased ROM, decreased strength and/or pain as indicated by Quickdash score of less than 40%.        Start:  11/06/24    Expected End:  01/29/25            Develop and issue HEP to help maximize ROM, strength and tolerance to help maximize return to all pre-onset activities.        Start:  11/06/24    Expected End:  01/29/25            Pain to be less than or equal to 1/10 with greater than or equal to 45 minutes activity.        Start:  11/06/24    Expected End:  01/29/25            Patient will demonstrate a progressive increase in ROM as appropriate with R digits and wrist to be within 5-10 degrees of L digits and wrist to help patient resume normal ADL and IADL function.        Start:  11/06/24    Expected End:  01/29/25            Pt will demonstrate increased  strength as appropriate with the R  to be greater than or equal to 80% of the L  to help patient resume ADLs and IADLs.        Start:  11/06/24    Expected End:  01/29/25

## 2025-02-19 ENCOUNTER — APPOINTMENT (OUTPATIENT)
Dept: OCCUPATIONAL THERAPY | Facility: CLINIC | Age: 75
End: 2025-02-19
Payer: COMMERCIAL

## 2025-02-19 ENCOUNTER — DOCUMENTATION (OUTPATIENT)
Dept: OCCUPATIONAL THERAPY | Facility: CLINIC | Age: 75
End: 2025-02-19
Payer: COMMERCIAL

## 2025-02-19 NOTE — PROGRESS NOTES
"Occupational Therapy                 Therapy Communication Note    Patient Name: Dunia Moreau \"Erin\"  MRN: [id number]  Today's Date: 2/19/2025     Discipline: Occupational Therapy    Missed Visit Reason:  Pt cancelled, pt has nose bleed that won't stop    Missed Time: Cancel    "

## 2025-02-20 ENCOUNTER — HOSPITAL ENCOUNTER (OUTPATIENT)
Dept: RADIOLOGY | Facility: CLINIC | Age: 75
Discharge: HOME | End: 2025-02-20
Payer: COMMERCIAL

## 2025-02-20 ENCOUNTER — HOSPITAL ENCOUNTER (OUTPATIENT)
Dept: RADIOLOGY | Facility: EXTERNAL LOCATION | Age: 75
Discharge: HOME | End: 2025-02-20

## 2025-02-20 ENCOUNTER — APPOINTMENT (OUTPATIENT)
Dept: ORTHOPEDIC SURGERY | Facility: CLINIC | Age: 75
End: 2025-02-20
Payer: COMMERCIAL

## 2025-02-20 ENCOUNTER — APPOINTMENT (OUTPATIENT)
Dept: OCCUPATIONAL THERAPY | Facility: CLINIC | Age: 75
End: 2025-02-20
Payer: COMMERCIAL

## 2025-02-20 VITALS — WEIGHT: 210.6 LBS | BODY MASS INDEX: 36.15 KG/M2

## 2025-02-20 DIAGNOSIS — S53.105D ELBOW DISLOCATION, LEFT, SUBSEQUENT ENCOUNTER: ICD-10-CM

## 2025-02-20 DIAGNOSIS — M75.02 ADHESIVE CAPSULITIS OF LEFT SHOULDER: ICD-10-CM

## 2025-02-20 DIAGNOSIS — S62.102A LEFT WRIST FRACTURE, CLOSED, INITIAL ENCOUNTER: ICD-10-CM

## 2025-02-20 PROCEDURE — 1125F AMNT PAIN NOTED PAIN PRSNT: CPT | Performed by: SPECIALIST

## 2025-02-20 PROCEDURE — 73110 X-RAY EXAM OF WRIST: CPT | Mod: LT

## 2025-02-20 PROCEDURE — 99214 OFFICE O/P EST MOD 30 MIN: CPT | Performed by: SPECIALIST

## 2025-02-20 PROCEDURE — 73030 X-RAY EXAM OF SHOULDER: CPT | Mod: LT

## 2025-02-20 PROCEDURE — 1159F MED LIST DOCD IN RCRD: CPT | Performed by: SPECIALIST

## 2025-02-20 PROCEDURE — 73080 X-RAY EXAM OF ELBOW: CPT | Mod: LT

## 2025-02-20 PROCEDURE — 1157F ADVNC CARE PLAN IN RCRD: CPT | Performed by: SPECIALIST

## 2025-02-20 PROCEDURE — 76882 US LMTD JT/FCL EVL NVASC XTR: CPT | Performed by: SPECIALIST

## 2025-02-20 PROCEDURE — 1036F TOBACCO NON-USER: CPT | Performed by: SPECIALIST

## 2025-02-20 RX ORDER — LORATADINE 10 MG/1
TABLET ORAL
COMMUNITY

## 2025-02-20 RX ORDER — ALBUTEROL SULFATE 90 UG/1
INHALANT RESPIRATORY (INHALATION)
COMMUNITY

## 2025-02-20 ASSESSMENT — PAIN SCALES - GENERAL
PAINLEVEL_OUTOF10: 2

## 2025-02-20 ASSESSMENT — PAIN - FUNCTIONAL ASSESSMENT: PAIN_FUNCTIONAL_ASSESSMENT: 0-10

## 2025-02-20 ASSESSMENT — PAIN DESCRIPTION - DESCRIPTORS
DESCRIPTORS: ACHING

## 2025-02-20 NOTE — ASSESSMENT & PLAN NOTE
Assessment: Left elbow dislocation now approximately 4-1/2 months status post 10/2/2024 date of injury.  Left wrist minimally displaced but intra-articular fracture with greater than 3 fragments from the 10/2/2024 injury.  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  She states this is the most active and disabling to her at this point.    Plan:  Continue occupational therapy for elbow range of motion advancing as tolerated.  Continue occupational therapy for distal radius range of motion and strengthening advancing as tolerated.  Elastic or rigid hand-based thumb spica splint  Follow-up in 3 months for reevaluation and new x-rays of the left elbow, shoulder, hand and left wrist.    She can use her bracing on a as needed basis.  Continue with the therapy for the shoulder as well  She did not need refills of any pain medications.

## 2025-02-20 NOTE — PROGRESS NOTES
"Assessment/Plan   Encounter Diagnoses:  Left wrist fracture, closed, initial encounter  Elbow dislocation, left, subsequent encounter  Assessment: Left elbow dislocation now approximately 4-1/2 months status post 10/2/2024 date of injury.  Left wrist minimally displaced but intra-articular fracture with greater than 3 fragments from the 10/2/2024 injury.  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  She states this is the most active and disabling to her at this point.    Plan:  Continue occupational therapy for elbow range of motion advancing as tolerated.  Continue occupational therapy for distal radius range of motion and strengthening advancing as tolerated.  Elastic or rigid hand-based thumb spica splint  Follow-up in 3 months for reevaluation and new x-rays of the left elbow, shoulder, hand and left wrist.    She can use her bracing on a as needed basis.  Continue with the therapy for the shoulder as well  She did not need refills of any pain medications.       Subjective    Patient ID: Dunia Moreau \"Izzy" is a 74 y.o. female.    Chief Complaint: Follow-up of the Left Elbow, Follow-up of the Left Wrist, Follow-up of the Left Hand, and Follow-up of the Left Shoulder     Last Surgery: No surgery found  Last Surgery Date: No surgery found    HPI  74-year-old who is 4-1/2 months status post 10/2/2024 elbow dislocation and left distal radius fracture.  She also had a left shoulder contusion and is now complaining of some CMC joint arthritis of the thumb.  Of all of her problems the thumb CMC joint seems to be the worst for her.      OBJECTIVE: ORTHO EXAM    Left elbow exam  No swelling or bruising is noted.    She demonstrates flexion to 140 degrees.  Full elbow extension without any apprehension.    Full pronation and supination.  She had no apprehension with range of motion nor with varus or valgus stress.  With range of motion especially at the radiocarpal " joint she had some minimal crepitation.    Left wrist  Swelling and bruising is resolved.  Neurovascularly intact distally.  She has dorsiflexion to greater than 45 degrees and palmar flexion to greater than 45.  Full pronation and supination.  None tender to palpation over the distal radius but this is improved from previous evaluations.  She states that she has some numbness and tingling in the middle and ring fingers.  She points to the dorsum and to the palmar aspect of both fingers.  On the ring finger she does not distinguish between the ulnar border and the radial border.  This has been slowly improving.  Over the left thumb she has shouldering of the thumb metacarpal with large osteophytes palpable.  She does have crepitation in the CMC joint with circumduction.    Left shoulder  Forward elevation to 170.  External rotation to 80 at 90 degrees of abduction  Internal rotation is nontender she gets about 80 degrees with her arm abducted at 90.  Nontender  to palpation  Strength testing was limited somewhat by patient cooperation and guarding.  She has at least 5/5 to all 3.    IMAGE RESULTS:  Point of Care Ultrasound  These images are not reportable by radiology and will not be interpreted   by  Radiologists.      ULTRASOUND  Site: Elbow  Indication: ELBOW PAIN  Technique: B-Mode Ultrasound Examination performed using 8-13 MHz linear transducer with [employer]  STUDY TYPE:   1. ULTRASOUND EXTREMITY INCLUDING BUT NOT LIMITED TO ELBOW MUSCLE, TENDONS, LIGAMENTS, FATTY TISSUES, SUBCUTANEOUS TISSUES AND OTHER SOFT TISSUE STRUCTURES SUCH AS ABSCESSES OR FREE FLUID ACCUMULATION WITHIN THE PRIMARY JOINT AS WELL AS ADJACENT JOINTS.  2. REAL TIME WITH IMAGE DOCUMENTATION  3. NON-VASCULAR  4. COMPLETE STUDY WHICH INCLUDES A THOROUGH EVALUATION OF THE ELBOW MUSCLE, TENDONS, LIGAMENTS, FATTY TISSUES, SUBCUTANEOUS TISSUES AND OTHER SOFT TISSUE STRUCTURES SUCH AS ABSCESSES OR FREE FLUID ACCUMULATION WITHIN THE PRIMARY  JOINT AS WELL AS ADJACENT JOINTS.  Live ultrasound was performed of the patient´s ELBOW and PERMANENTLY documented.  This is a thorough and complete evaluation of a specific anatomic region specifically the  ELBOW.  PERMANENT Image documentation was performed.  This is the complete and final ultrasound report of the patient's ELBOW.  The patient was positioned in order to optimize the ultrasound evaluation of the  ELBOW.  Ultrasound gel was used as a conductive medium in order to both transmit and receive ultrasonic signals that characterize the soft tissues. . I personally performed the ultrasound and reviewed the findings. These show:  Findings:  ELBOW  Cony-articular evaluation: Live ultrasound was performed of the patient's  ELBOW that shows a normal and intact appearance of the biceps/brachialis tendon. The triceps tendon shows a normal and intact appearance. The  lateral epicondylar areas and  tendon attachments show normal sonographic configuration.  The  medial epicondylar areas and  tendon attachments show normal sonographic configuration.  The olecranon bursa appears normal.  Joint Evaluation: The radial head and neck show no bony abnormality or fracture.  The olecranon shows no bony abnormality or fracture.  A minimal joint effusion is still seen.    Wrist Ultrasound    DIAGNOSTIC ULTRASOUND REPORT FINAL: Left HAND AND WRIST  Sonographer: Russell Lowe MD  Procedure: Ultrasound, extremity, nonvascular, real-time, COMPLETE, anatomic specific.  Indication: Wrist Pain  Technique: B-Mode Ultrasound Examination performed using 8-13 MHz linear transducer with [employer]  STUDY TYPE:  1. ULTRASOUND EXTREMITY  2. REAL TIME WITH IMAGE DOCUMENTATION  3. NON-VASCULAR  4. COMPLETE STUDY  Site: LEFT HAND AND WRIST  Live ultrasound was performed with the patient's HAND AND WRIST and PERMANENTLY documented. COMPLETE and FINAL ULTRASOUND REPORT of the patient's  HAND AND WRIST. . I personally performed the ultrasound  and reviewed the findings. These show:    Live ultrasound was performed of the patient's HAND AND WRIST that shows intact Extensor digitorum communis, Extensor digiti minimi, Extensor indicis proprius, Extensor Pollicus Longus, Flexor Pollicus Longus, Flexor digitorum profundus, Flexor digitorum superficialis tendon with the THENAR and HYPOTHENAR muscle fibers showing normal striations. NO FLUID NOTED WITHIN THE CARPAL TUNNEL, THE MEDIAN NERVE SHOWS NORMAL PATTERN OF ECHOGENICITY. The median n. remains swollen.  The tendons appear normal in appearance and size as they pass the styloid process. No fracture is appreciated. Nonsterile gloves were used for this procedure  gauze pads were then used to clean the area after the procedure was compete. The patient tolerated the procedure well.  Moderate to severe arthritis in the CMC joint with lateral subluxation of the metacarpal base and mild to moderate effusion.  Med. nerve is slightly swollen.      Procedures     Orders Placed This Encounter    Point of Care Ultrasound

## 2025-02-21 ENCOUNTER — TREATMENT (OUTPATIENT)
Dept: OCCUPATIONAL THERAPY | Facility: CLINIC | Age: 75
End: 2025-02-21
Payer: COMMERCIAL

## 2025-02-21 DIAGNOSIS — S53.105D ELBOW DISLOCATION, LEFT, SUBSEQUENT ENCOUNTER: ICD-10-CM

## 2025-02-21 DIAGNOSIS — M79.642 LEFT HAND PAIN: ICD-10-CM

## 2025-02-21 DIAGNOSIS — S62.102A LEFT WRIST FRACTURE, CLOSED, INITIAL ENCOUNTER: ICD-10-CM

## 2025-02-21 PROCEDURE — 97530 THERAPEUTIC ACTIVITIES: CPT | Mod: GO,CO

## 2025-02-21 ASSESSMENT — PAIN DESCRIPTION - DESCRIPTORS
DESCRIPTORS: ACHING
DESCRIPTORS: TIGHTNESS

## 2025-02-21 ASSESSMENT — PAIN - FUNCTIONAL ASSESSMENT: PAIN_FUNCTIONAL_ASSESSMENT: 0-10

## 2025-02-21 ASSESSMENT — PAIN SCALES - GENERAL
PAINLEVEL_OUTOF10: 1
PAINLEVEL_OUTOF10: 1

## 2025-02-21 NOTE — PROGRESS NOTES
"Occupational Therapy    Occupational Therapy Treatment    Name: Dunia Moreau \"Erin\"  MRN: [id number]  : 1950  Date: 25    Time Entry:  Time Calculation  Start Time: 1025  Stop Time: 1110  Time Calculation (min): 45 min        OT Therapeutic Procedures Time Entry  Therapeutic Activity Time Entry: 45                Assessment: Patient demos understanding of importance of completing exercises, no increased pain reported in elbow region, some pain and fatigue reported in CMC region.     Plan: Recheck next session with OT  OT Plan: TE, TA, manual, modalities  Duration: 8 weeks  Onset Date: 24  Rehab Potential: Good  Plan of Care Agreement: Patient    Subjective   General: Patient states she is doing okay, states CMC region has been bothering her the most. Patient states she has not been doing her exercises at all from OT or PT.    General  Reason for Referral: L wrist fx  Referred By: Russell Lowe  General Comment:   Precautions:  Precautions Comment: N/A  Pain Assessment:  Pain Assessment  Pain Assessment: 0-10  0-10 (Numeric) Pain Score: 1  Pain Location: Wrist  Pain Orientation: Left  Pain Descriptors: Aching  Pain Onset: Ongoing  Clinical Progression: Gradually improving  Patient's Stated Pain Goal: No pain  Pain 2  Pain Score 2: 1  Pain Type 2: Acute pain  Pain Location 2: Elbow  Pain Descriptors 2: Tightness    Objective          Therapy/Activity:   Therapeutic Activity  Therapeutic Activity Performed: Yes  Therapeutic Activity 1: L hand alternating digits to pinch loops on velcro blocks x 30 blocks  Therapeutic Activity 2: L hand alternating digits to push down on Velcro blocks to tighten x 30 blocks  Therapeutic Activity 3: L hand FMC altnerating digits to  mini pegs to place into purple T putty to tighten x 20 pegs  Therapeutic Activity 4: L hand on short handle reacher with elbow at side picking up blocks in pronation to place in bucket in supination x 30 blocks  Therapeutic " Activity 5: Extensive education on completing HEP for progress      Goals:  Active       OT Goals       LTG - Patient will indicate/ demonstrate the ability to resume all preinjury ADLs and IADLs without significant limits secondary to decreased ROM, decreased strength and/or pain as indicated by Quickdash score of less than 40%.        Start:  11/06/24    Expected End:  01/29/25            Develop and issue HEP to help maximize ROM, strength and tolerance to help maximize return to all pre-onset activities.        Start:  11/06/24    Expected End:  01/29/25            Pain to be less than or equal to 1/10 with greater than or equal to 45 minutes activity.        Start:  11/06/24    Expected End:  01/29/25            Patient will demonstrate a progressive increase in ROM as appropriate with R digits and wrist to be within 5-10 degrees of L digits and wrist to help patient resume normal ADL and IADL function.        Start:  11/06/24    Expected End:  01/29/25            Pt will demonstrate increased  strength as appropriate with the R  to be greater than or equal to 80% of the L  to help patient resume ADLs and IADLs.        Start:  11/06/24    Expected End:  01/29/25

## 2025-02-26 ENCOUNTER — TREATMENT (OUTPATIENT)
Dept: OCCUPATIONAL THERAPY | Facility: CLINIC | Age: 75
End: 2025-02-26
Payer: COMMERCIAL

## 2025-02-26 DIAGNOSIS — S62.102A LEFT WRIST FRACTURE, CLOSED, INITIAL ENCOUNTER: ICD-10-CM

## 2025-02-26 DIAGNOSIS — S53.105D ELBOW DISLOCATION, LEFT, SUBSEQUENT ENCOUNTER: ICD-10-CM

## 2025-02-26 PROCEDURE — 97530 THERAPEUTIC ACTIVITIES: CPT | Mod: GO

## 2025-02-26 ASSESSMENT — PAIN SCALES - GENERAL
PAINLEVEL_OUTOF10: 3
PAINLEVEL_OUTOF10: 3

## 2025-02-26 ASSESSMENT — PAIN - FUNCTIONAL ASSESSMENT: PAIN_FUNCTIONAL_ASSESSMENT: 0-10

## 2025-02-26 ASSESSMENT — PAIN DESCRIPTION - DESCRIPTORS: DESCRIPTORS: ACHING

## 2025-02-26 NOTE — PROGRESS NOTES
"Occupational Therapy    OT Recheck    Patient Name: Dunia Moreau \"Erin\"  MRN: [id number]  Today's Date: 2/26/2025     Time Calculation  Start Time: 1000  Stop Time: 1045  Time Calculation (min): 45 min    Visit Number: 15  Therapeutic Procedures:      OT Therapeutic Procedures Time Entry  Therapeutic Activity Time Entry: 45                         Current Problem:  1. Left wrist fracture, closed, initial encounter  Follow Up In Occupational Therapy      2. Elbow dislocation, left, subsequent encounter  Follow Up In Occupational Therapy          Subjective     General: Pt reports that she still has trouble with he LMF and that she still has swelling. Pt has not done any HEP and does not wear isotoner glove for edema. Pt agrees to cont therapy 1 x per weeks for 2 to focus on DIP ROM. Pt verbalized that she will do HEP this week,      Reason for Referral: L wrist fx  Referred By: Russell Lowe  General Comment: 14/20  Pain:  Pain Assessment  Pain Assessment: 0-10  0-10 (Numeric) Pain Score: 3  Pain Location: Other (Comment) (Middle finger)  Pain Orientation: Left  Pain Descriptors: Aching  Pain Onset: Ongoing  Patient's Stated Pain Goal: No pain  Pain 2  Pain Score 2: 3  Pain Type 2: Acute pain  Pain Location 2: Elbow    Objective       Therapy/Activity:                     OP EDUCATION:   EDU on POC and HEP importance     Assessment:  OT assessed ROM and strength, pt showed deficits in Rom and strength. Pt would benefit from skilled OT to address deficits.      2/26/2025  Wrist Measurements:  WFL unless documented below   Flexion  Extension Radial Dev Ulnar Dev Supination Pronation   Left 75 75 25 55 85 90     Elbow ROM:  WFL unless documented below   Flexion Extension    Left 125 0     Digit Measurements:  WFL unless documented below   MCP PIP DIP DPC   Thumb 20 75 NA 0   Index 85/-20 80/+5 55/-10 1   Long 90/-15 90/0 65/-5 0   Ring 90/-20 90/0 65/-5 0   Small 90/-20 70/-3 65/0 0     :  Average taken from best 3 " measurements.   Trials Average   RUE 45/40/40 41.6   LUE 27/30/30 29     Lateral Pinch:   Trials Average   RUE 10/9/9 9.3   LUE 4/4/3 3.6     Three Point Pinch:   Trials Average   RUE 7/7/6 6.6   LUE 4/3/4 3.6     Quickdash Scores:20; 20.45%      1/23/2025  Wrist Measurements:  WFL unless documented below    Flexion  Extension Radial Dev Ulnar Dev   Right 70 80 15 35   Left 85 65 25 40      Elbow ROM:  WFL unless documented below    Flexion Extension  Supination Pronation   Right 145 0 90 90   Left 150 +5 85 85      Digit Measurements:  WFL unless documented below    MCP PIP DIP DPC   Thumb 25 75 NA 0   Index 80 (-20) 85 (+3) 50(0) 2.5   Long 80 (-10) 90(0) 60(0) 1   Ring 80 (-10) 90(0) 60(-8) 1   Small 80 (-10) 90(0) 60(0) 1      :  Average taken from best 3 measurements.    Trials Average   RUE 40/40/40 40   LUE 15,15,15 15      Lateral Pinch:    Trials Average   RUE 10/10/10 10   LUE 4/4/4 4      Three Point Pinch:    Trials Average   RUE 8/7/7 7.3   LUE 4/4/4 4         Quickdash Scores:23; 27.27%      11/06/2024   Wrist Measurements:  WFL unless documented below    Flexion  Extension Radial Dev Ulnar Dev Supination Pronation   Right 75 80 22 35 90 80   Left 55 25 7 35 85 80      Elbow ROM:  WFL unless documented below    Flexion Extension    Right 40 180   Left 60 155      Digit Measurements:  WFL unless documented below    MCP PIP DIP DPC   Thumb 25(-5) 60(-3) NA 3   Index 50(+10) 50(-3) 20(0) 5   Long 60(+12) 65(-3) 25(0) 5   Ring 60(+20) 75(0) 25(+3) 4.5   Small 65(+30) 50((-10) 35(-10) 4         Sensation:   Pt reported some numbness in the LMF.   Outcome Measures:   Quickdash Scores: 50; 88.64%  Plan:   Pt to continue addressing ROM and strength. 1 time per week 2 weeks.   OT Plan: TE, TA, manual, modalities  Duration: 8 weeks  Onset Date: 11/06/24  Rehab Potential: Good  Plan of Care Agreement: Patient  Goals:  Active       OT Goals       LTG - Patient will indicate/ demonstrate the ability to resume  all preinjury ADLs and IADLs without significant limits secondary to decreased ROM, decreased strength and/or pain as indicated by Quickdash score of less than 40%.        Start:  11/06/24    Expected End:  01/29/25            Develop and issue HEP to help maximize ROM, strength and tolerance to help maximize return to all pre-onset activities.        Start:  11/06/24    Expected End:  01/29/25            Pain to be less than or equal to 1/10 with greater than or equal to 45 minutes activity.        Start:  11/06/24    Expected End:  01/29/25            Patient will demonstrate a progressive increase in ROM as appropriate with R digits and wrist to be within 5-10 degrees of L digits and wrist to help patient resume normal ADL and IADL function.        Start:  11/06/24    Expected End:  01/29/25            Pt will demonstrate increased  strength as appropriate with the R  to be greater than or equal to 80% of the L  to help patient resume ADLs and IADLs.        Start:  11/06/24    Expected End:  01/29/25

## 2025-03-05 ENCOUNTER — CLINICAL SUPPORT (OUTPATIENT)
Dept: OCCUPATIONAL THERAPY | Facility: CLINIC | Age: 75
End: 2025-03-05
Payer: COMMERCIAL

## 2025-03-05 DIAGNOSIS — S53.105D ELBOW DISLOCATION, LEFT, SUBSEQUENT ENCOUNTER: ICD-10-CM

## 2025-03-05 DIAGNOSIS — S62.102A LEFT WRIST FRACTURE, CLOSED, INITIAL ENCOUNTER: ICD-10-CM

## 2025-03-05 PROCEDURE — 97530 THERAPEUTIC ACTIVITIES: CPT | Mod: GO,CO

## 2025-03-05 ASSESSMENT — PAIN SCALES - GENERAL
PAINLEVEL_OUTOF10: 1
PAINLEVEL_OUTOF10: 0 - NO PAIN

## 2025-03-05 ASSESSMENT — PAIN DESCRIPTION - DESCRIPTORS: DESCRIPTORS: TIGHTNESS

## 2025-03-05 ASSESSMENT — PAIN - FUNCTIONAL ASSESSMENT: PAIN_FUNCTIONAL_ASSESSMENT: 0-10

## 2025-03-05 NOTE — PROGRESS NOTES
"Occupational Therapy    Occupational Therapy Treatment    Name: Dunia Moreau \"Erin\"  MRN: [id number]  : 1950  Date: 25    Time Entry:  Time Calculation  Start Time: 1000  Stop Time: 1045  Time Calculation (min): 45 min        OT Therapeutic Procedures Time Entry  Therapeutic Activity Time Entry: 45                Assessment: Patient tolerating activities well, states \"my elbow actually is feeling better from continuing to use it\" demos understanding of Isotoner glove and how to obtain comfort cool splint.    Plan: Continue with current POC towards OT goals  OT Plan: TE, TA, manual, modalities  Duration: 8 weeks  Onset Date: 24  Rehab Potential: Good  Plan of Care Agreement: Patient    Subjective   General: Patient states she is doing okay, states her thumb is the biggest thing that bothers her (around CMC region of L hand)    General  Reason for Referral: L wrist fx  Referred By: Russell Lowe  General Comment: 15/20  Precautions:  Precautions Comment: N/A  Pain Assessment:  Pain Assessment  Pain Assessment: 0-10  0-10 (Numeric) Pain Score: 0 - No pain  Pain Orientation: Left  Patient's Stated Pain Goal: No pain  Pain 2  Pain Score 2: 1 (up to 5/10 pain with movement)  Pain Type 2: Acute pain  Pain Location 2: Elbow  Pain Descriptors 2: Tightness    Objective        Therapy/Activity:   Therapeutic Activity  Therapeutic Activity Performed: Yes  Therapeutic Activity 1: patient educated on comfort cool splint and how to obtain  Therapeutic Activity 2: Patient education to continue exercises  Therapeutic Activity 3: L hand on red tweezers picking up mini poms to reach out in extension and place into extension x 40 poms  Therapeutic Activity 4: Provided and educated on Isotoner glove (fingerless) size medium for L hand only  Therapeutic Activity 5: L hand grasping cones on R side of body thumbs up placing on L side of body thumbs down x 10 cones x 3 trials  Therapeutic Activity 6: L hand flattening " out green foam block on table x 10 w/ 3 second holds while standing and elbow extended        Goals:  Active       OT Goals       LTG - Patient will indicate/ demonstrate the ability to resume all preinjury ADLs and IADLs without significant limits secondary to decreased ROM, decreased strength and/or pain as indicated by Quickdash score of less than 40%.        Start:  11/06/24    Expected End:  01/29/25            Develop and issue HEP to help maximize ROM, strength and tolerance to help maximize return to all pre-onset activities.        Start:  11/06/24    Expected End:  01/29/25            Pain to be less than or equal to 1/10 with greater than or equal to 45 minutes activity.        Start:  11/06/24    Expected End:  01/29/25            Patient will demonstrate a progressive increase in ROM as appropriate with R digits and wrist to be within 5-10 degrees of L digits and wrist to help patient resume normal ADL and IADL function.        Start:  11/06/24    Expected End:  01/29/25            Pt will demonstrate increased  strength as appropriate with the R  to be greater than or equal to 80% of the L  to help patient resume ADLs and IADLs.        Start:  11/06/24    Expected End:  01/29/25

## 2025-03-12 ENCOUNTER — TREATMENT (OUTPATIENT)
Dept: OCCUPATIONAL THERAPY | Facility: CLINIC | Age: 75
End: 2025-03-12
Payer: COMMERCIAL

## 2025-03-12 DIAGNOSIS — S53.105D ELBOW DISLOCATION, LEFT, SUBSEQUENT ENCOUNTER: ICD-10-CM

## 2025-03-12 DIAGNOSIS — S62.102A LEFT WRIST FRACTURE, CLOSED, INITIAL ENCOUNTER: ICD-10-CM

## 2025-03-12 PROCEDURE — 97140 MANUAL THERAPY 1/> REGIONS: CPT | Mod: GO

## 2025-03-12 PROCEDURE — 97110 THERAPEUTIC EXERCISES: CPT | Mod: GO

## 2025-03-12 ASSESSMENT — PAIN DESCRIPTION - DESCRIPTORS
DESCRIPTORS: ACHING
DESCRIPTORS: ACHING

## 2025-03-12 ASSESSMENT — PAIN SCALES - GENERAL
PAINLEVEL_OUTOF10: 3
PAINLEVEL_OUTOF10: 3

## 2025-03-12 ASSESSMENT — PAIN - FUNCTIONAL ASSESSMENT: PAIN_FUNCTIONAL_ASSESSMENT: 0-10

## 2025-03-12 NOTE — PROGRESS NOTES
"Occupational Therapy    OT Recheck     Patient Name: Dunia Moreau \"Erin\"  MRN: [id number]  Today's Date: 3/12/2025     Time Calculation  Start Time: 1030  Stop Time: 1110  Time Calculation (min): 40 min    Visit Number: 17  Therapeutic Procedures:      OT Therapeutic Procedures Time Entry  Manual Therapy Time Entry: 25  Therapeutic Exercise Time Entry: 20                         Current Problem:  1. Left wrist fracture, closed, initial encounter  Follow Up In Occupational Therapy      2. Elbow dislocation, left, subsequent encounter  Follow Up In Occupational Therapy          Subjective     General: pt reported that she had a busy week. Patient is independent with current HEP, doing it ever other day. Pt receptive to cont therapy. Session not marked as recheck, OT completed check on  strength and determined need to cont to work on strengthening.        Reason for Referral: L wrist fx  Referred By: Russell Lowe  General Comment: 16/20  Pain:  Pain Assessment  Pain Assessment: 0-10  0-10 (Numeric) Pain Score: 3  Pain Orientation: Left  Pain Descriptors: Aching  Patient's Stated Pain Goal: No pain  Pain 2  Pain Score 2: 3  Pain Type 2: Acute pain  Pain Location 2: Elbow  Pain Descriptors 2: Aching    Objective       Therapy/Activity:   Modalities  Modalities Used: Yes     Manual Therapy  Manual Therapy Activity 1: STM on LUE  Manual Therapy Activity 2: IASTM on LUE dorsal forearm  Therapeutic Exercise  Therapeutic Exercise Activity 1: thera bar, dark green,  Therapeutic Exercise Activity 2: flex web (push pull, twist) x 10 reps      OP EDUCATION:   Cont strengthening HEP     Assessment:   Pt participated in therapeutic exercises and activities as needed to increase Rom and strength. Pt demo decrease strength. OT assessed strength, pt showed deficits in LUE. Pt would benefit from skilled OT to address deficits. Pt agrees to work on strengthening and then be done after 2 more weeks.        R  -35 #  L  25 " #    Plan:  OT Plan: TE, TA, manual, modalities  Duration: 8 weeks  Onset Date: 11/06/24  Rehab Potential: Good  Plan of Care Agreement: Patient  Goals:  Active       OT Goals       LTG - Patient will indicate/ demonstrate the ability to resume all preinjury ADLs and IADLs without significant limits secondary to decreased ROM, decreased strength and/or pain as indicated by Quickdash score of less than 40%.        Start:  11/06/24    Expected End:  01/29/25            Develop and issue HEP to help maximize ROM, strength and tolerance to help maximize return to all pre-onset activities.        Start:  11/06/24    Expected End:  01/29/25            Pain to be less than or equal to 1/10 with greater than or equal to 45 minutes activity.        Start:  11/06/24    Expected End:  01/29/25            Patient will demonstrate a progressive increase in ROM as appropriate with R digits and wrist to be within 5-10 degrees of L digits and wrist to help patient resume normal ADL and IADL function.        Start:  11/06/24    Expected End:  01/29/25            Pt will demonstrate increased  strength as appropriate with the R  to be greater than or equal to 80% of the L  to help patient resume ADLs and IADLs.        Start:  11/06/24    Expected End:  01/29/25

## 2025-03-19 ENCOUNTER — TREATMENT (OUTPATIENT)
Dept: OCCUPATIONAL THERAPY | Facility: CLINIC | Age: 75
End: 2025-03-19
Payer: COMMERCIAL

## 2025-03-19 DIAGNOSIS — S62.102A LEFT WRIST FRACTURE, CLOSED, INITIAL ENCOUNTER: ICD-10-CM

## 2025-03-19 DIAGNOSIS — S53.105D ELBOW DISLOCATION, LEFT, SUBSEQUENT ENCOUNTER: ICD-10-CM

## 2025-03-19 PROCEDURE — 97530 THERAPEUTIC ACTIVITIES: CPT | Mod: GO

## 2025-03-19 PROCEDURE — 97110 THERAPEUTIC EXERCISES: CPT | Mod: GO

## 2025-03-19 ASSESSMENT — PAIN SCALES - GENERAL: PAINLEVEL_OUTOF10: 4

## 2025-03-19 ASSESSMENT — PAIN - FUNCTIONAL ASSESSMENT: PAIN_FUNCTIONAL_ASSESSMENT: 0-10

## 2025-03-19 NOTE — PROGRESS NOTES
"Occupational Therapy    OT Treatment    Patient Name: Dunia Moreau \"Erin\"  MRN: [id number]  Today's Date: 3/19/2025     Time Calculation  Start Time: 1030  Stop Time: 1113  Time Calculation (min): 43 min    Visit Number: 18  Therapeutic Procedures:      OT Therapeutic Procedures Time Entry  Therapeutic Activity Time Entry: 25  Therapeutic Exercise Time Entry: 17                         Current Problem:  1. Left wrist fracture, closed, initial encounter  Follow Up In Occupational Therapy      2. Elbow dislocation, left, subsequent encounter  Follow Up In Occupational Therapy          Subjective     General: Pt reported they she got a bloody noise last night for 1 hour. Pt reported she was sore after last session.     Reason for Referral: L wrist fx  Referred By: Russell Lowe  General Comment: 17/20  Pain:  Pain Assessment  Pain Assessment: 0-10  0-10 (Numeric) Pain Score: 4  Pain Location: Shoulder  Pain Orientation: Left  Patient's Stated Pain Goal: No pain    Objective       Therapy/Activity:   Modalities  Modalities Used: Yes  Therapeutic Activity  Therapeutic Activity 1: handhelp with red band picking up blocks  Therapeutic Activity 2: pink putty finding 14 beads  Therapeutic Activity 3: tennis ball with flat marbles     Therapeutic Exercise  Therapeutic Exercise Activity 1: thera bar, dark green ( U and n, twist, RD/UD)  Therapeutic Exercise Activity 2: hammer with 1lbs weight ( rd/ud , bicep curls/ext, sup/pro) x 15 reps        OP EDUCATION:   Cont HEP     Assessment:   Pt participated in therapeutic exercises and activities as needed to increase strength. Pt tolerated flexbar with some reports of muscle fatigue. Pt required weight down grade with SUP and PRO with hammer, OT doffed 1lb wrist weight. Pt tolerated activities with ext time to complete. Pt completed tennis ball with mouth.      Plan:  OT Plan: TE, TA, manual, modalities  Duration: 8 weeks  Onset Date: 11/06/24  Rehab Potential: Good  Plan of Care " Agreement: Patient  Goals:  Active       OT Goals       LTG - Patient will indicate/ demonstrate the ability to resume all preinjury ADLs and IADLs without significant limits secondary to decreased ROM, decreased strength and/or pain as indicated by Quickdash score of less than 40%.        Start:  11/06/24    Expected End:  01/29/25            Develop and issue HEP to help maximize ROM, strength and tolerance to help maximize return to all pre-onset activities.        Start:  11/06/24    Expected End:  01/29/25            Pain to be less than or equal to 1/10 with greater than or equal to 45 minutes activity.        Start:  11/06/24    Expected End:  01/29/25            Patient will demonstrate a progressive increase in ROM as appropriate with R digits and wrist to be within 5-10 degrees of L digits and wrist to help patient resume normal ADL and IADL function.        Start:  11/06/24    Expected End:  01/29/25            Pt will demonstrate increased  strength as appropriate with the R  to be greater than or equal to 80% of the L  to help patient resume ADLs and IADLs.        Start:  11/06/24    Expected End:  01/29/25

## 2025-03-26 ENCOUNTER — TREATMENT (OUTPATIENT)
Dept: OCCUPATIONAL THERAPY | Facility: CLINIC | Age: 75
End: 2025-03-26
Payer: COMMERCIAL

## 2025-03-26 DIAGNOSIS — S53.105D ELBOW DISLOCATION, LEFT, SUBSEQUENT ENCOUNTER: ICD-10-CM

## 2025-03-26 DIAGNOSIS — S62.102A LEFT WRIST FRACTURE, CLOSED, INITIAL ENCOUNTER: ICD-10-CM

## 2025-03-26 PROCEDURE — 97110 THERAPEUTIC EXERCISES: CPT | Mod: GO

## 2025-03-26 PROCEDURE — 97530 THERAPEUTIC ACTIVITIES: CPT | Mod: GO

## 2025-03-26 ASSESSMENT — PAIN - FUNCTIONAL ASSESSMENT: PAIN_FUNCTIONAL_ASSESSMENT: 0-10

## 2025-03-26 ASSESSMENT — PAIN SCALES - GENERAL: PAINLEVEL_OUTOF10: 0 - NO PAIN

## 2025-03-26 NOTE — PROGRESS NOTES
"Occupational Therapy    OT Discharge    Patient Name: Dunia Moreau \"Erin\"  MRN: [id number]  Today's Date: 3/26/2025     Time Calculation  Start Time: 1030  Stop Time: 1115  Time Calculation (min): 45 min    Visit Number: 19  Therapeutic Procedures:      OT Therapeutic Procedures Time Entry  Therapeutic Activity Time Entry: 20  Therapeutic Exercise Time Entry: 25                         Current Problem:  1. Left wrist fracture, closed, initial encounter  Follow Up In Occupational Therapy      2. Elbow dislocation, left, subsequent encounter  Follow Up In Occupational Therapy          Subjective     General:     Reason for Referral: L wrist fx  Referred By: Russell Lowe  General Comment: 18/20  Pain:  Pain Assessment  Pain Assessment: 0-10  0-10 (Numeric) Pain Score: 0 - No pain  Patient's Stated Pain Goal: No pain    Objective       Therapy/Activity:   Modalities  Modalities Used: Yes  Therapeutic Activity  Therapeutic Activity 1: asssessed ROm and strength     Therapeutic Exercise  Therapeutic Exercise Activity 1: thera bar, dark green ( U and n, twist, RD/UD)  Therapeutic Exercise Activity 2: flex web (push pull, twist) x 10 reps        OP EDUCATION:   Cont with HEP after DC,     Assessment:   Pt participated in therapeutic exercises and activities as needed to increase strength. OT assessed Rom and strength, pt showed no deficits in Rom and strength. Pt is ready for DC. Pt tolerate all exercises.      3/26/2025  Wrist Measurements:  WFL unless documented below   Flexion  Extension Radial Dev Ulnar Dev Supination Pronation   Right 80 60 25 45 90 90   Left 80 65 20 40 90 90     :  Average taken from best 3 measurements.   Trials Average   RUE 30/30/30 30   LUE 25/30/25 26.6     Lateral Pinch:   Trials Average   RUE 10/11/10 10.3   LUE 6/6/6 6     Three Point Pinch:   Trials Average   RUE 8/7/6 7   LUE 6/7/7 6.6       Quickdash Scores:18; 15.91%      2/26/2025  Wrist Measurements:  WFL unless documented below   "  Flexion  Extension Radial Dev Ulnar Dev Supination Pronation   Left 75 75 25 55 85 90      Elbow ROM:  WFL unless documented below    Flexion Extension    Left 125 0      Digit Measurements:  WFL unless documented below    MCP PIP DIP DPC   Thumb 20 75 NA 0   Index 85/-20 80/+5 55/-10 1   Long 90/-15 90/0 65/-5 0   Ring 90/-20 90/0 65/-5 0   Small 90/-20 70/-3 65/0 0      :  Average taken from best 3 measurements.    Trials Average   RUE 45/40/40 41.6   LUE 27/30/30 29      Lateral Pinch:    Trials Average   RUE 10/9/9 9.3   LUE 4/4/3 3.6      Three Point Pinch:    Trials Average   RUE 7/7/6 6.6   LUE 4/3/4 3.6      Quickdash Scores:20; 20.45%       1/23/2025  Wrist Measurements:  WFL unless documented below    Flexion  Extension Radial Dev Ulnar Dev   Right 70 80 15 35   Left 85 65 25 40      Elbow ROM:  WFL unless documented below    Flexion Extension  Supination Pronation   Right 145 0 90 90   Left 150 +5 85 85      Digit Measurements:  WFL unless documented below    MCP PIP DIP DPC   Thumb 25 75 NA 0   Index 80 (-20) 85 (+3) 50(0) 2.5   Long 80 (-10) 90(0) 60(0) 1   Ring 80 (-10) 90(0) 60(-8) 1   Small 80 (-10) 90(0) 60(0) 1      :  Average taken from best 3 measurements.    Trials Average   RUE 40/40/40 40   LUE 15,15,15 15      Lateral Pinch:    Trials Average   RUE 10/10/10 10   LUE 4/4/4 4      Three Point Pinch:    Trials Average   RUE 8/7/7 7.3   LUE 4/4/4 4         Quickdash Scores:23; 27.27%      11/06/2024   Wrist Measurements:  WFL unless documented below    Flexion  Extension Radial Dev Ulnar Dev Supination Pronation   Right 75 80 22 35 90 80   Left 55 25 7 35 85 80      Elbow ROM:  WFL unless documented below    Flexion Extension    Right 40 180   Left 60 155      Digit Measurements:  WFL unless documented below    MCP PIP DIP DPC   Thumb 25(-5) 60(-3) NA 3   Index 50(+10) 50(-3) 20(0) 5   Long 60(+12) 65(-3) 25(0) 5   Ring 60(+20) 75(0) 25(+3) 4.5   Small 65(+30) 50((-10) 35(-10) 4          Sensation:   Pt reported some numbness in the LMF.   Outcome Measures:   Quickdash Scores: 50; 88.64%  Plan:  Discharge  OT Plan: TE, TA, manual, modalities  Duration: 8 weeks  Onset Date: 11/06/24  Rehab Potential: Good  Plan of Care Agreement: Patient  Goals:  Resolved       OT Goals       LTG - Patient will indicate/ demonstrate the ability to resume all preinjury ADLs and IADLs without significant limits secondary to decreased ROM, decreased strength and/or pain as indicated by Quickdash score of less than 40%.  (Met)       Start:  11/06/24    Expected End:  01/29/25    Resolved:  03/26/25         Develop and issue HEP to help maximize ROM, strength and tolerance to help maximize return to all pre-onset activities.  (Met)       Start:  11/06/24    Expected End:  01/29/25    Resolved:  03/26/25         Pain to be less than or equal to 1/10 with greater than or equal to 45 minutes activity.  (Met)       Start:  11/06/24    Expected End:  01/29/25    Resolved:  03/26/25         Patient will demonstrate a progressive increase in ROM as appropriate with R digits and wrist to be within 5-10 degrees of L digits and wrist to help patient resume normal ADL and IADL function.  (Met)       Start:  11/06/24    Expected End:  01/29/25    Resolved:  03/26/25         Pt will demonstrate increased  strength as appropriate with the R  to be greater than or equal to 80% of the L  to help patient resume ADLs and IADLs.  (Met)       Start:  11/06/24    Expected End:  01/29/25    Resolved:  03/26/25

## 2025-04-09 DIAGNOSIS — K21.9 GASTROESOPHAGEAL REFLUX DISEASE WITHOUT ESOPHAGITIS: ICD-10-CM

## 2025-04-09 DIAGNOSIS — R13.19 ESOPHAGEAL DYSPHAGIA: ICD-10-CM

## 2025-04-10 RX ORDER — PANTOPRAZOLE SODIUM 40 MG/1
40 TABLET, DELAYED RELEASE ORAL DAILY
Qty: 30 TABLET | Refills: 11 | Status: SHIPPED | OUTPATIENT
Start: 2025-04-10 | End: 2026-04-10

## 2025-04-30 DIAGNOSIS — K44.9 HIATAL HERNIA: Primary | ICD-10-CM

## 2025-04-30 DIAGNOSIS — R13.19 ESOPHAGEAL DYSPHAGIA: ICD-10-CM

## 2025-04-30 DIAGNOSIS — K21.9 GASTROESOPHAGEAL REFLUX DISEASE WITHOUT ESOPHAGITIS: ICD-10-CM

## 2025-05-07 ENCOUNTER — TELEPHONE (OUTPATIENT)
Dept: GASTROENTEROLOGY | Facility: CLINIC | Age: 75
End: 2025-05-07
Payer: COMMERCIAL

## 2025-05-07 RX ORDER — PANTOPRAZOLE SODIUM 40 MG/1
40 TABLET, DELAYED RELEASE ORAL 2 TIMES DAILY
Qty: 60 TABLET | Refills: 11 | Status: SHIPPED | OUTPATIENT
Start: 2025-05-07 | End: 2026-05-07

## 2025-05-07 NOTE — TELEPHONE ENCOUNTER
PATIENT NOTIFIED AND VERBALIZED UNDERSTANDING     ----- Message from Ede Nunez sent at 5/7/2025  1:45 PM EDT -----  Regarding: RE: GERD  Have her increase Protonix to BID. I sent in updated Rx to pharmacy  ----- Message -----  From: Marina Senior MA  Sent: 5/7/2025   1:15 PM EDT  To: Vandana Rocha MA; Ede Nunez DO; Tiffa#  Subject: GERD                                             Pt calling stating that her GERD is out of control taking pantoprazole, tried drinking milk soda. Has an appt with Dr. Chapman on 6/4

## 2025-05-29 ENCOUNTER — HOSPITAL ENCOUNTER (OUTPATIENT)
Dept: RADIOLOGY | Facility: EXTERNAL LOCATION | Age: 75
Discharge: HOME | End: 2025-05-29

## 2025-05-29 ENCOUNTER — HOSPITAL ENCOUNTER (OUTPATIENT)
Dept: RADIOLOGY | Facility: CLINIC | Age: 75
Discharge: HOME | End: 2025-05-29
Payer: COMMERCIAL

## 2025-05-29 ENCOUNTER — APPOINTMENT (OUTPATIENT)
Dept: ORTHOPEDIC SURGERY | Facility: CLINIC | Age: 75
End: 2025-05-29
Payer: COMMERCIAL

## 2025-05-29 DIAGNOSIS — S53.105S ELBOW DISLOCATION, LEFT, SEQUELA: ICD-10-CM

## 2025-05-29 DIAGNOSIS — S62.102A LEFT WRIST FRACTURE, CLOSED, INITIAL ENCOUNTER: ICD-10-CM

## 2025-05-29 DIAGNOSIS — S53.105D ELBOW DISLOCATION, LEFT, SUBSEQUENT ENCOUNTER: ICD-10-CM

## 2025-05-29 DIAGNOSIS — S62.102S LEFT WRIST FRACTURE, SEQUELA: ICD-10-CM

## 2025-05-29 DIAGNOSIS — M79.642 LEFT HAND PAIN: ICD-10-CM

## 2025-05-29 DIAGNOSIS — M75.02 ADHESIVE CAPSULITIS OF LEFT SHOULDER: ICD-10-CM

## 2025-05-29 PROCEDURE — 73080 X-RAY EXAM OF ELBOW: CPT | Mod: LT

## 2025-05-29 PROCEDURE — 1036F TOBACCO NON-USER: CPT | Performed by: SPECIALIST

## 2025-05-29 PROCEDURE — 99214 OFFICE O/P EST MOD 30 MIN: CPT | Performed by: SPECIALIST

## 2025-05-29 PROCEDURE — 1159F MED LIST DOCD IN RCRD: CPT | Performed by: SPECIALIST

## 2025-05-29 PROCEDURE — 76882 US LMTD JT/FCL EVL NVASC XTR: CPT | Performed by: SPECIALIST

## 2025-05-29 PROCEDURE — 1125F AMNT PAIN NOTED PAIN PRSNT: CPT | Performed by: SPECIALIST

## 2025-05-29 PROCEDURE — 1160F RVW MEDS BY RX/DR IN RCRD: CPT | Performed by: SPECIALIST

## 2025-05-29 PROCEDURE — 73130 X-RAY EXAM OF HAND: CPT | Mod: LT

## 2025-05-29 PROCEDURE — 73110 X-RAY EXAM OF WRIST: CPT | Mod: LT

## 2025-05-29 PROCEDURE — 73080 X-RAY EXAM OF ELBOW: CPT | Mod: LEFT SIDE | Performed by: RADIOLOGY

## 2025-05-29 PROCEDURE — 73030 X-RAY EXAM OF SHOULDER: CPT | Mod: LT

## 2025-05-29 RX ORDER — DICLOFENAC SODIUM 10 MG/G
4 GEL TOPICAL 4 TIMES DAILY PRN
Qty: 100 G | Refills: 1 | Status: SHIPPED | OUTPATIENT
Start: 2025-05-29

## 2025-05-29 ASSESSMENT — PAIN SCALES - GENERAL
PAINLEVEL_OUTOF10: 0 - NO PAIN
PAINLEVEL_OUTOF10: 1

## 2025-05-29 ASSESSMENT — PAIN DESCRIPTION - DESCRIPTORS: DESCRIPTORS: DISCOMFORT

## 2025-05-29 ASSESSMENT — PAIN - FUNCTIONAL ASSESSMENT: PAIN_FUNCTIONAL_ASSESSMENT: 0-10

## 2025-05-29 NOTE — ASSESSMENT & PLAN NOTE
Assessment: Left elbow dislocation now approximately 8 months status post 10/2/2024 date of injury.  Left wrist minimally displaced but intra-articular fracture with greater than 3 fragments from the 10/2/2024 injury.  Left shoulder contusion and soreness  left thumb CMC joint arthritis.  She states this is the most active and disabling to her at this point.    Plan:  Continue her home exercise program.  She states that she is returned to activities of daily living with minimal pain and disability.    She did not need refills of any pain medications.

## 2025-05-29 NOTE — PROGRESS NOTES
Alyssa Moreau  is a 74 y.o. female who presents for evaluation of hiatal hernia.    This patient presented to her gastroenterologist with worsening symptoms of reflux and dysphagia.  She notes dysphagia to solid foods such as bread rice and chicken.  She has been treating this with Pepcid without improvement.  Her gastroenterologist (Enrique) recommended an EGD, which was performed on 1/24/2024 and showed a small type II hiatal hernia with the GE junction at 38 cm from the incisors.  Biopsies of the GE junction showed no evidence of Johnston's esophagus.  Her gastro doctor also recommended twice daily PPI treatment, which has helped her symptoms, but they remain significant.     Currently the patient is reporting symptoms of chronic cough, dysphagia, and heartburn. She denies the following symptoms: chest pain, shortness of breath at rest, shortness of breath with activity, hemoptysis, fevers, chills, weight loss, vomiting, and changes to bowel function.      Medical history is notable for no history of MI, CVA or other major cardiovascular disease. She has no history of prior chest surgery.   She has no history of prior cancer.  She has no first degree relatives with lung cancer.     She  reports that she has never smoked. She has never used smokeless tobacco. She reports current alcohol use. She reports that she does not use drugs.    Objective   Physical Exam  The patient is well-appearing and in no acute distress. The trachea is midline and there is no crepitus. The lungs were clear to auscultation grossly. There was good effort and excursion. The heart had a regular rate and rhythm. The abdomen was soft, nontender and nondistended. The extremities had no edema or gross deformities. Mood and affect are appropriate.  Diagnostic Studies  I reviewed the test results described in the HPI    Assessment/Plan   I believe that the patient has a diagnosis of hiatal hernia with acid reflux/GERD.     This  patient has significant symptoms of reflux despite appropriate medical treatment.  In this setting, I believe hiatal hernia repair improves gastrointestinal quality of life and often patients are able to stop acid reflux medications. She is obese and this may be involved in her symptoms as well. We discussed weight loss and its role in symptoms.     We discussed the surgery in detail. I carefully described the risks, benefits, and alternatives to surgical intervention. We discussed the possibility of serious complications including death. I answered any questions they expressed. After this, the patient agreed to proceed with surgery    I recommend CT A/P and surgery. Robotic HHR.    I discussed this in detail with the patient, including a discussion of alternatives. They were comfortable with this approach.     Jairon Chapman MD  [phone]

## 2025-05-29 NOTE — PROGRESS NOTES
"Assessment/Plan   Encounter Diagnoses:  Elbow dislocation, left, subsequent encounter    Adhesive capsulitis of left shoulder    Left wrist fracture, sequela    Elbow dislocation, left, sequela  Elbow dislocation, left, subsequent encounter  Assessment: Left elbow dislocation now approximately 8 months status post 10/2/2024 date of injury.  Left wrist minimally displaced but intra-articular fracture with greater than 3 fragments from the 10/2/2024 injury.  Left shoulder contusion and soreness  left thumb CMC joint arthritis.  She states this is the most active and disabling to her at this point.    Plan:  Continue her home exercise program.  She states that she is returned to activities of daily living with minimal pain and disability.    She did not need refills of any pain medications.       Subjective    Patient ID: Dunia Moreau \"Izzy" is a 74 y.o. female.    Chief Complaint: Follow-up of the Left Elbow, Follow-up of the Left Wrist, Follow-up of the Left Hand, and Follow-up of the Left Shoulder     Last Surgery: No surgery found  Last Surgery Date: No surgery found    HPI  74-year-old who is 4-1/2 months status post 10/2/2024 elbow dislocation and left distal radius fracture.  She also had a left shoulder contusion and is now complaining of some CMC joint arthritis of the thumb.  Of all of her problems the thumb CMC joint seems to be the worst for her.    5/29/25-8 months status post elbow dislocation and distal radius fracture.  She also has a shoulder contusion on the left and some complaints of CMC joint arthritis of the thumb which radiographically and clinically is severe.  OBJECTIVE: ORTHO EXAM    Left elbow exam  No swelling or bruising is noted.    She demonstrates flexion to 145 degrees.  Full elbow extension without any apprehension.    Full pronation and supination.  She had no apprehension with range of motion nor with varus or valgus stress.  With range of motion especially at the radiocarpal " joint she had some minimal crepitation.    Left wrist  Swelling and bruising is resolved.  Neurovascularly intact distally.  She has dorsiflexion to greater than 45 degrees and palmar flexion to greater than 45.  Full pronation and supination.  None tender to palpation over the distal radius but this is improved from previous evaluations.    Over the left thumb she has shouldering of the thumb metacarpal with large osteophytes palpable.  She does have crepitation in the CMC joint with circumduction.    Left shoulder  Forward elevation to 170.  External rotation to 80 at 90 degrees of abduction  Internal rotation is nontender she gets about 80 degrees with her arm abducted at 90.  Nontender  to palpation  Strength testing was limited somewhat by patient cooperation and guarding.  She has at least 5/5 to all 3.    IMAGE RESULTS:  Point of Care Ultrasound  These images are not reportable by radiology and will not be interpreted   by  Radiologists.      ULTRASOUND    Wrist Ultrasound    DIAGNOSTIC ULTRASOUND REPORT FINAL: Left HAND AND WRIST  Sonographer: Russell Lowe MD  Procedure: Ultrasound, extremity, nonvascular, real-time, COMPLETE, anatomic specific.  Indication: Wrist Pain  Technique: B-Mode Ultrasound Examination performed using 8-13 MHz linear transducer with Fullscreen Software  STUDY TYPE:  1. ULTRASOUND EXTREMITY  2. REAL TIME WITH IMAGE DOCUMENTATION  3. NON-VASCULAR  4. COMPLETE STUDY  Site: LEFT HAND AND WRIST  Live ultrasound was performed with the patient's HAND AND WRIST and PERMANENTLY documented. COMPLETE and FINAL ULTRASOUND REPORT of the patient's  HAND AND WRIST. . I personally performed the ultrasound and reviewed the findings. These show:    Live ultrasound was performed of the patient's HAND AND WRIST that shows intact Extensor digitorum communis, Extensor digiti minimi, Extensor indicis proprius, Extensor Pollicus Longus, Flexor Pollicus Longus, Flexor digitorum profundus, Flexor digitorum  superficialis tendon with the THENAR and HYPOTHENAR muscle fibers showing normal striations. NO FLUID NOTED WITHIN THE CARPAL TUNNEL, THE MEDIAN NERVE SHOWS NORMAL PATTERN OF ECHOGENICITY. The median n. remains swollen.  The tendons appear normal in appearance and size as they pass the styloid process. No fracture is appreciated. Nonsterile gloves were used for this procedure  gauze pads were then used to clean the area after the procedure was compete. The patient tolerated the procedure well.  Moderate to severe arthritis in the CMC joint with lateral subluxation of the metacarpal base and mild effusion.  Med. nerve is slightly swollen.      Procedures     Orders Placed This Encounter    Point of Care Ultrasound

## 2025-06-04 ENCOUNTER — OFFICE VISIT (OUTPATIENT)
Dept: SURGERY | Facility: CLINIC | Age: 75
End: 2025-06-04
Payer: COMMERCIAL

## 2025-06-04 VITALS
SYSTOLIC BLOOD PRESSURE: 185 MMHG | WEIGHT: 212.4 LBS | HEART RATE: 67 BPM | TEMPERATURE: 98.2 F | HEIGHT: 64 IN | DIASTOLIC BLOOD PRESSURE: 89 MMHG | OXYGEN SATURATION: 99 % | BODY MASS INDEX: 36.26 KG/M2

## 2025-06-04 DIAGNOSIS — K44.9 HIATAL HERNIA: Primary | ICD-10-CM

## 2025-06-04 PROCEDURE — 1159F MED LIST DOCD IN RCRD: CPT | Performed by: THORACIC SURGERY (CARDIOTHORACIC VASCULAR SURGERY)

## 2025-06-04 PROCEDURE — 1126F AMNT PAIN NOTED NONE PRSNT: CPT | Performed by: THORACIC SURGERY (CARDIOTHORACIC VASCULAR SURGERY)

## 2025-06-04 PROCEDURE — 99215 OFFICE O/P EST HI 40 MIN: CPT | Mod: 57 | Performed by: THORACIC SURGERY (CARDIOTHORACIC VASCULAR SURGERY)

## 2025-06-04 PROCEDURE — 99205 OFFICE O/P NEW HI 60 MIN: CPT | Performed by: THORACIC SURGERY (CARDIOTHORACIC VASCULAR SURGERY)

## 2025-06-04 PROCEDURE — 1036F TOBACCO NON-USER: CPT | Performed by: THORACIC SURGERY (CARDIOTHORACIC VASCULAR SURGERY)

## 2025-06-04 RX ORDER — ACETAMINOPHEN 325 MG/1
650 TABLET ORAL ONCE
OUTPATIENT
Start: 2025-06-04 | End: 2025-06-04

## 2025-06-04 RX ORDER — HEPARIN SODIUM 5000 [USP'U]/ML
5000 INJECTION, SOLUTION INTRAVENOUS; SUBCUTANEOUS ONCE
OUTPATIENT
Start: 2025-06-04 | End: 2025-06-04

## 2025-06-04 RX ORDER — CEFAZOLIN SODIUM 2 G/100ML
2 INJECTION, SOLUTION INTRAVENOUS ONCE
OUTPATIENT
Start: 2025-06-04 | End: 2025-06-04

## 2025-06-04 RX ORDER — GABAPENTIN 300 MG/1
300 CAPSULE ORAL ONCE
OUTPATIENT
Start: 2025-06-04 | End: 2025-06-04

## 2025-06-04 SDOH — ECONOMIC STABILITY: FOOD INSECURITY: WITHIN THE PAST 12 MONTHS, THE FOOD YOU BOUGHT JUST DIDN'T LAST AND YOU DIDN'T HAVE MONEY TO GET MORE.: NEVER TRUE

## 2025-06-04 SDOH — ECONOMIC STABILITY: FOOD INSECURITY: WITHIN THE PAST 12 MONTHS, YOU WORRIED THAT YOUR FOOD WOULD RUN OUT BEFORE YOU GOT MONEY TO BUY MORE.: NEVER TRUE

## 2025-06-04 ASSESSMENT — COLUMBIA-SUICIDE SEVERITY RATING SCALE - C-SSRS
2. HAVE YOU ACTUALLY HAD ANY THOUGHTS OF KILLING YOURSELF?: NO
1. IN THE PAST MONTH, HAVE YOU WISHED YOU WERE DEAD OR WISHED YOU COULD GO TO SLEEP AND NOT WAKE UP?: NO
6. HAVE YOU EVER DONE ANYTHING, STARTED TO DO ANYTHING, OR PREPARED TO DO ANYTHING TO END YOUR LIFE?: NO

## 2025-06-04 ASSESSMENT — LIFESTYLE VARIABLES
HOW MANY STANDARD DRINKS CONTAINING ALCOHOL DO YOU HAVE ON A TYPICAL DAY: 1 OR 2
SKIP TO QUESTIONS 9-10: 1
HOW MANY STANDARD DRINKS CONTAINING ALCOHOL DO YOU HAVE ON A TYPICAL DAY: PATIENT DOES NOT DRINK
HOW OFTEN DO YOU HAVE SIX OR MORE DRINKS ON ONE OCCASION: NEVER
HOW OFTEN DO YOU HAVE A DRINK CONTAINING ALCOHOL: 2-4 TIMES A MONTH
AUDIT-C TOTAL SCORE: 2
HOW OFTEN DO YOU HAVE SIX OR MORE DRINKS ON ONE OCCASION: NEVER
HOW OFTEN DO YOU HAVE A DRINK CONTAINING ALCOHOL: NEVER
SKIP TO QUESTIONS 9-10: 1
AUDIT-C TOTAL SCORE: 0

## 2025-06-04 ASSESSMENT — ENCOUNTER SYMPTOMS
LOSS OF SENSATION IN FEET: 0
DEPRESSION: 0
OCCASIONAL FEELINGS OF UNSTEADINESS: 0

## 2025-06-04 ASSESSMENT — PAIN SCALES - GENERAL: PAINLEVEL_OUTOF10: 0-NO PAIN

## 2025-06-10 PROBLEM — K44.9 HIATAL HERNIA: Status: ACTIVE | Noted: 2025-06-04

## 2025-06-13 ENCOUNTER — APPOINTMENT (OUTPATIENT)
Dept: RADIOLOGY | Facility: HOSPITAL | Age: 75
End: 2025-06-13
Payer: COMMERCIAL

## 2025-06-13 DIAGNOSIS — K44.9 HIATAL HERNIA: ICD-10-CM

## 2025-06-13 PROCEDURE — 74176 CT ABD & PELVIS W/O CONTRAST: CPT

## 2025-06-16 ENCOUNTER — TELEPHONE (OUTPATIENT)
Dept: CARDIOTHORACIC SURGERY | Facility: HOSPITAL | Age: 75
End: 2025-06-16
Payer: COMMERCIAL

## 2025-06-16 DIAGNOSIS — K44.9 HIATAL HERNIA: ICD-10-CM

## 2025-06-16 NOTE — TELEPHONE ENCOUNTER
I called pt to discuss CT results.  I explained to her that Dr. Chapman would like her to get a Renal MRI due to the incidental finding of right renal mass.  She stated she has been told in the past about this right renal mass.  She had a gallbladder scan (2/2024) that showed a 6 x 7 cm cyst which was similar to prior renal U/S done on 9/6/2019.  She also saw a urologist 4/17/24 who had her do an U/S.  I did see a U/S renal from [facility] done on 5/8/24.  This information was discussed with Dr. Chapman, and he was comfortable with her previous renal work-up, and stated we could proceed with hiatal hernia surgery scheduled for 7/8 with no further testing needed for the renal mass.  I did remind Erin that she could get her labs drawn at any  facility and that [facility] will call her the day before her surgery to let her know time and exact location she needed to go day of surgery.  She verbalized understanding, and had no further questions at this time.

## 2025-06-19 ENCOUNTER — LAB (OUTPATIENT)
Dept: LAB | Facility: HOSPITAL | Age: 75
End: 2025-06-19
Payer: COMMERCIAL

## 2025-06-19 LAB
ABO GROUP (TYPE) IN BLOOD: NORMAL
ANTIBODY SCREEN: NORMAL
RH FACTOR (ANTIGEN D): NORMAL

## 2025-06-19 PROCEDURE — 86901 BLOOD TYPING SEROLOGIC RH(D): CPT

## 2025-06-19 PROCEDURE — 86900 BLOOD TYPING SEROLOGIC ABO: CPT

## 2025-06-19 PROCEDURE — 86850 RBC ANTIBODY SCREEN: CPT

## 2025-06-20 LAB
ANION GAP SERPL CALCULATED.4IONS-SCNC: 10 MMOL/L (CALC) (ref 7–17)
BUN SERPL-MCNC: 21 MG/DL (ref 7–25)
BUN/CREAT SERPL: ABNORMAL (CALC) (ref 6–22)
CALCIUM SERPL-MCNC: 9.6 MG/DL (ref 8.6–10.4)
CHLORIDE SERPL-SCNC: 104 MMOL/L (ref 98–110)
CO2 SERPL-SCNC: 28 MMOL/L (ref 20–32)
CREAT SERPL-MCNC: 0.86 MG/DL (ref 0.6–1)
EGFRCR SERPLBLD CKD-EPI 2021: 70 ML/MIN/1.73M2
ERYTHROCYTE [DISTWIDTH] IN BLOOD BY AUTOMATED COUNT: 12.5 % (ref 11–15)
GLUCOSE SERPL-MCNC: 110 MG/DL (ref 65–99)
HCT VFR BLD AUTO: 44.5 % (ref 35–45)
HGB BLD-MCNC: 13.7 G/DL (ref 11.7–15.5)
INR PPP: 1
MCH RBC QN AUTO: 28.7 PG (ref 27–33)
MCHC RBC AUTO-ENTMCNC: 30.8 G/DL (ref 32–36)
MCV RBC AUTO: 93.1 FL (ref 80–100)
PLATELET # BLD AUTO: 254 THOUSAND/UL (ref 140–400)
PMV BLD REES-ECKER: 12.2 FL (ref 7.5–12.5)
POTASSIUM SERPL-SCNC: 4.8 MMOL/L (ref 3.5–5.3)
PROTHROMBIN TIME: 10.4 SEC (ref 9–11.5)
RBC # BLD AUTO: 4.78 MILLION/UL (ref 3.8–5.1)
SODIUM SERPL-SCNC: 142 MMOL/L (ref 135–146)
WBC # BLD AUTO: 4.5 THOUSAND/UL (ref 3.8–10.8)

## 2025-06-25 RX ORDER — CHLORHEXIDINE GLUCONATE 40 MG/ML
SOLUTION TOPICAL DAILY
Qty: 118 ML | Refills: 0 | Status: SHIPPED | OUTPATIENT
Start: 2025-06-25 | End: 2025-06-30

## 2025-06-25 RX ORDER — CHLORHEXIDINE GLUCONATE ORAL RINSE 1.2 MG/ML
15 SOLUTION DENTAL AS NEEDED
Qty: 120 ML | Refills: 0 | Status: SHIPPED | OUTPATIENT
Start: 2025-06-25 | End: 2025-07-06 | Stop reason: ALTCHOICE

## 2025-06-26 ENCOUNTER — PRE-ADMISSION TESTING (OUTPATIENT)
Dept: PREADMISSION TESTING | Facility: HOSPITAL | Age: 75
End: 2025-06-26
Payer: COMMERCIAL

## 2025-06-26 VITALS
DIASTOLIC BLOOD PRESSURE: 93 MMHG | RESPIRATION RATE: 20 BRPM | OXYGEN SATURATION: 96 % | BODY MASS INDEX: 35.68 KG/M2 | WEIGHT: 209 LBS | SYSTOLIC BLOOD PRESSURE: 175 MMHG | HEART RATE: 84 BPM | HEIGHT: 64 IN | TEMPERATURE: 96.8 F

## 2025-06-26 DIAGNOSIS — R73.03 PREDIABETES: ICD-10-CM

## 2025-06-26 DIAGNOSIS — Z01.818 PRE-OP TESTING: Primary | ICD-10-CM

## 2025-06-26 LAB
ABO GROUP (TYPE) IN BLOOD: NORMAL
ABO GROUP (TYPE) IN BLOOD: NORMAL
ANTIBODY SCREEN: NORMAL
RH FACTOR (ANTIGEN D): NORMAL
RH FACTOR (ANTIGEN D): NORMAL
T4 FREE SERPL-MCNC: 0.98 NG/DL (ref 0.61–1.12)
TSH SERPL-ACNC: 4.44 MIU/L (ref 0.44–3.98)

## 2025-06-26 PROCEDURE — 93005 ELECTROCARDIOGRAM TRACING: CPT

## 2025-06-26 PROCEDURE — 36415 COLL VENOUS BLD VENIPUNCTURE: CPT

## 2025-06-26 PROCEDURE — 87081 CULTURE SCREEN ONLY: CPT | Mod: PARLAB

## 2025-06-26 PROCEDURE — 86850 RBC ANTIBODY SCREEN: CPT | Performed by: THORACIC SURGERY (CARDIOTHORACIC VASCULAR SURGERY)

## 2025-06-26 PROCEDURE — 84439 ASSAY OF FREE THYROXINE: CPT

## 2025-06-26 PROCEDURE — 93010 ELECTROCARDIOGRAM REPORT: CPT | Performed by: STUDENT IN AN ORGANIZED HEALTH CARE EDUCATION/TRAINING PROGRAM

## 2025-06-26 PROCEDURE — 84443 ASSAY THYROID STIM HORMONE: CPT

## 2025-06-26 PROCEDURE — 99204 OFFICE O/P NEW MOD 45 MIN: CPT | Performed by: NURSE PRACTITIONER

## 2025-06-26 PROCEDURE — 36415 COLL VENOUS BLD VENIPUNCTURE: CPT | Performed by: THORACIC SURGERY (CARDIOTHORACIC VASCULAR SURGERY)

## 2025-06-26 ASSESSMENT — PAIN - FUNCTIONAL ASSESSMENT: PAIN_FUNCTIONAL_ASSESSMENT: 0-10

## 2025-06-26 ASSESSMENT — DUKE ACTIVITY SCORE INDEX (DASI)
DASI METS SCORE: 9
CAN YOU WALK A BLOCK OR TWO ON LEVEL GROUND: YES
CAN YOU PARTICIPATE IN MODERATE RECREATIONAL ACTIVITIES LIKE GOLF, BOWLING, DANCING, DOUBLES TENNIS OR THROWING A BASEBALL OR FOOTBALL: YES
TOTAL_SCORE: 50.7
CAN YOU DO MODERATE WORK AROUND THE HOUSE LIKE VACUUMING, SWEEPING FLOORS OR CARRYING GROCERIES: YES
CAN YOU TAKE CARE OF YOURSELF (EAT, DRESS, BATHE, OR USE TOILET): YES
CAN YOU PARTICIPATE IN STRENOUS SPORTS LIKE SWIMMING, SINGLES TENNIS, FOOTBALL, BASKETBALL, OR SKIING: NO
CAN YOU DO LIGHT WORK AROUND THE HOUSE LIKE DUSTING OR WASHING DISHES: YES
CAN YOU WALK INDOORS, SUCH AS AROUND YOUR HOUSE: YES
CAN YOU CLIMB A FLIGHT OF STAIRS OR WALK UP A HILL: YES
CAN YOU RUN A SHORT DISTANCE: YES
CAN YOU DO YARD WORK LIKE RAKING LEAVES, WEEDING OR PUSHING A MOWER: YES
CAN YOU HAVE SEXUAL RELATIONS: YES
CAN YOU DO HEAVY WORK AROUND THE HOUSE LIKE SCRUBBING FLOORS OR LIFTING AND MOVING HEAVY FURNITURE: YES

## 2025-06-26 ASSESSMENT — PAIN DESCRIPTION - DESCRIPTORS: DESCRIPTORS: BURNING

## 2025-06-26 ASSESSMENT — PAIN SCALES - GENERAL: PAINLEVEL_OUTOF10: 8

## 2025-06-26 NOTE — CPM/PAT H&P
"[facility]/PAT Evaluation       Name: Dunia Moreau (Dunia Moreau \"Erin\")  /Age: 1950/75 y.o.     In-Person       Chief Complaint:     75 yr old female presents to [facility] for pre-operative evaluation, with c/o hiatal hernia  ROBOTIC ASSISTED TRANSABDOMINAL HIATAL HERNIA REPAIR/ POSSIBLE FUNDOPLICATION/  POSSIBLE MESH/ POSSIBLE LAPAROTOMY  is Scheduled on 2025  with Dr. Chapman    The patient has the following past medical history:  Hypothyroid, anxiety/depression, GERD, VIGNESH, HLD,     Chief complaint:  She reports c/o heartburn and reflux, taking omeprazole per her PCP, for years  Reports worsening acid reflux and now has dysphagia---> r/t solid foods and has difficulty with carbonated beverages---> Had the EGD done and started on Pantoprazole     States no improvement with Pantoprazole and was referred to Dr. Chapman for surgery    She c/o at meal time or sitting, will feel acid come and needs to drink water or chew gum  Feels like there is phlegm with throat clearing  Reports voice changes  States throat feels like a burning with clear mucus  \"Doesn't always have trouble swallowing, trouble with dry stuff like bread, acidic foods like tomatoes burn.\"  Denies any bloody sputum/spit up     Currently she avoids any spicy food.  Drinking water helps  Sleeps will 2 pillows under head and may sleep in a chair    Reports PND---d/t allergies and has chronic cough  States was recently on ATB for sinus infection    PCP BARBIE Márquez--LOV 2025    Denies fever, chills or nausea.   Denies any past issues with anesthesia.      Vitals:    25 0951   BP: (!) 175/93   Pulse: 84   Resp: 20   Temp: 36 °C (96.8 °F)   SpO2: 96%          Medical History[1]    Surgical History[2]    Patient  reports that she is not currently sexually active and has had partner(s) who are male. She reports using the following method of birth control/protection: Post-menopausal.    Family History[3]  Sisters and mom with " AFIB    Allergies[4]    Prior to Admission medications    Medication Sig Start Date End Date Taking? Authorizing Provider   albuterol 90 mcg/actuation inhaler Inhale.  Patient taking differently: Inhale if needed.    Historical Provider, MD   b complex vitamins (Vitamins B Complex) capsule Take 1 capsule by mouth once daily.    Historical Provider, MD   CALCIUM CITRATE-VITAMIN D3 ORAL Take 1 tablet by mouth 1 (one) time each day.    Historical Provider, MD   chlorhexidine (Hibiclens) 4 % external liquid Apply topically once daily for 5 days. Begin using CHG for a total of 5 days before surgery Day 5 is the day of surgery See directions from the hospital 6/25/25 6/30/25  PALOMA Tovar   chlorhexidine (Peridex) 0.12 % solution Use 15 mL in the mouth or throat if needed for wound care (oral rinse the night before surgery and the morning on the day of surgery) for up to 2 doses. Swish in mouth and spit out 6/25/25   PALOMA Tvoar   co-enzyme Q-10 30 mg capsule Take 1 capsule (30 mg) by mouth once daily.    Historical Provider, MD   diclofenac sodium (Voltaren) 1 % gel Apply 4.5 inches (4 g) topically 4 times a day as needed (USE AS NEED FOR HAND PAIN). 5/29/25   Russell Lowe MD   fluticasone (Flonase) 50 mcg/actuation nasal spray Administer 2 sprays into each nostril once daily. 7/11/24   Lucas Esquivel PA-C   levothyroxine (Synthroid, Levoxyl) 50 mcg tablet Take 1 tablet (50 mcg) by mouth once daily. 7/11/24 7/11/25  Lucas Esquivel PA-C   loratadine (Claritin) 10 mg tablet Take by mouth.    Historical Provider, MD   magnesium oxide-Mg AA chelate (Magnesium, oxide/AA chelate,) 300 mg capsule Take 1 capsule (300 mg) by mouth once daily.    Historical Provider, MD   multivitamin tablet Take 1 tablet by mouth once daily.    Historical Provider, MD   omega-3 acid ethyl esters (Lovaza) 1 gram capsule Take 1 capsule (1 g) by mouth once daily.    Historical Provider, MD   pantoprazole (ProtoNix) 40  "mg EC tablet Take 1 tablet (40 mg) by mouth 2 times a day. Do not crush, chew, or split. 5/7/25 5/7/26  Ede Nunez, DO   PARoxetine (Paxil) 30 mg tablet Take 1 tablet (30 mg) by mouth once daily. 7/11/24 7/11/25  Lucas Esquivel PA-C      Review of Systems  Constitutional: NO F, chills, or sweats  Eyes: feels like there is water in the head with sinus pain;  no blurred vision or visual disturbance  ENT: presented to [facility] clinic 6/5 with c/o sinus congestion -->tx with doxycycline . Symptoms resolved / still c/o headache, puffy eyes, and PND, now taking tylenol sinus and saline nasal gtts  Cardiovascular: was told she has a heart murmur, + ankle swelling, no chest pain, no palps and no syncope.   Respiratory: +cough d/t PND,  no s.o.b. and no wheezing  Gastrointestinal: see HPI, nausea with ice cream, no abdominal pain, no blood in stools  Genitourinary: VIGNESH, nocturia, + urgency and frequency, no dysuria,  no urinary hesitancy  Musculoskeletal: stiff neck and lower back, no arthralgias,  no back pain and no myalgias.   Integumentary: \"skin very thin on arms, easily bruised,\" \"bruises d/t dog\"  Neurological: no difficulty walking, no headache, no limb weakness, no numbness and no tingling.   Endocrine: no recent weight gain and no recent weight loss.   Hematologic/Lymphatic: + tendency for easy bruising and no swollen glands.        Physical Exam  Constitutional:       Appearance: Normal appearance. She is obese.   HENT:      Mouth/Throat:      Pharynx: Posterior oropharyngeal erythema and postnasal drip present.   Cardiovascular:      Rate and Rhythm: Normal rate.      Heart sounds: Normal heart sounds.   Pulmonary:      Effort: Pulmonary effort is normal.      Breath sounds: Normal breath sounds.   Abdominal:      General: Bowel sounds are normal.      Palpations: Abdomen is soft.   Musculoskeletal:         General: Normal range of motion.      Cervical back: Normal range of motion.      Right lower leg: Edema " "present.      Left lower leg: Edema present.   Skin:     General: Skin is warm and dry.             Comments: + scattered bruising to BUE and BLE  Various sizes and various stages of healing   Neurological:      Mental Status: She is alert and oriented to person, place, and time.          PAT AIRWAY:   Airway:     Mallampati::  II    TM distance::  <3 FB    Neck ROM::  Full  normal        Visit Vitals  BP (!) 175/93   Pulse 84   Temp 36 °C (96.8 °F) (Temporal)   Resp 20   Ht 1.626 m (5' 4\")   Wt 94.8 kg (208 lb 15.9 oz)   SpO2 96%   BMI 35.87 kg/m²   OB Status Postmenopausal   Smoking Status Never   BSA 2.07 m²       DASI Risk Score      Flowsheet Row Pre-Admission Testing from 6/26/2025 in [facility]   Can you take care of yourself (eat, dress, bathe, or use toilet)?  2.75 filed at 06/26/2025 1003   Can you walk indoors, such as around your house? 1.75 filed at 06/26/2025 1003   Can you walk a block or two on level ground?  2.75 filed at 06/26/2025 1003   Can you climb a flight of stairs or walk up a hill? 5.5 filed at 06/26/2025 1003   Can you run a short distance? 8 filed at 06/26/2025 1003   Can you do light work around the house like dusting or washing dishes? 2.7 filed at 06/26/2025 1003   Can you do moderate work around the house like vacuuming, sweeping floors or carrying groceries? 3.5 filed at 06/26/2025 1003   Can you do heavy work around the house like scrubbing floors or lifting and moving heavy furniture?  8 filed at 06/26/2025 1003   Can you do yard work like raking leaves, weeding or pushing a mower? 4.5 filed at 06/26/2025 1003   Can you have sexual relations? 5.25 filed at 06/26/2025 1003   Can you participate in moderate recreational activities like golf, bowling, dancing, doubles tennis or throwing a baseball or football? 6 filed at 06/26/2025 1003   Can you participate in strenous sports like swimming, singles tennis, football, basketball, or skiing? 0 filed at 06/26/2025 1003 "   DASI SCORE 50.7 filed at 06/26/2025 1003   METS Score (Will be calculated only when all the questions are answered) 9 filed at 06/26/2025 1003          Caprini DVT Assessment    No data to display       Modified Frailty Index    No data to display       [id number] Stroke Risk Points  Current as of just now        N/A 0 to 9 Points:      Last Change: N/A          The [id number] risk score (Lip GULSHAN, et al. 2009. © 2010 [employer]) quantifies the risk of stroke for a patient with atrial fibrillation. For patients without atrial fibrillation or under the age of 18 this score appears as N/A. Higher score values generally indicate higher risk of stroke.        This score is not applicable to this patient. Components are not calculated.          Revised Cardiac Risk Index    No data to display       Apfel Simplified Score    No data to display       Risk Analysis Index Results This Encounter    No data found in the last 10 encounters.       Stop Bang Score      Flowsheet Row Pre-Admission Testing from 6/26/2025 in [facility]   Do you snore loudly? 1 filed at 06/26/2025 1002   Do you often feel tired or fatigued after your sleep? 0 filed at 06/26/2025 1002   Has anyone ever observed you stop breathing in your sleep? 0 filed at 06/26/2025 1002   Do you have or are you being treated for high blood pressure? 0 filed at 06/26/2025 1002   Recent BMI (Calculated) 35.9 filed at 06/26/2025 1002   Is BMI greater than 35 kg/m2? 1=Yes filed at 06/26/2025 1002   Age older than 50 years old? 1=Yes filed at 06/26/2025 1002   Is your neck circumference greater than 17 inches (Male) or 16 inches (Female)? 0 filed at 06/26/2025 1002   Gender - Male 0=No filed at 06/26/2025 1002   STOP-BANG Total Score 3 filed at 06/26/2025 1002          Prodigy: High Risk  Total Score: 12              Prodigy Age Score           ARISCAT Score for Postoperative Pulmonary Complications    No data to display        Mikael Perioperative Risk for Myocardial Infarction or Cardiac Arrest (CARLA)    No data to display         ASSESSMENT  Obesity   BMI 34.46    Hypothyroid  Takes Levothyroxine  TSH 6/26/3035 4.44, Free T4-    anxiety/depression  Takes Paxil     VIGNESH- with coughing sneezing and lifting  Uses pads/liners    HLD  Takes Lovaza  ECG 6/26/2025- Sinus rhythm with frequent PVC's, 72 bpm    GERD/ hiatal hernia    Takes Pantoprazole  Plan for robotic assisted hiatal hernia repair possible mesh, as scheduled          ANESTHESIA FINDINGS:  Intubation History: No history of difficult intubation  Significant Anesthesia Considerations:      Airway History: No abnormal airway history  Predictors of Difficult Airway Management  ? STOP BANG- 3  ? Obesity        CONSULTS:    Patient does not require consults for optimization at this time.     The Following Tests/Procedures Have Been Initiated and Reviewed/ interpreted by me:   CBC, BMP, Coag screen, MRSA screen, Type / screen, TSH, ECG     Planned Anesthetic: general     Instructions Given to Patient:  *Reviewed Medications to be taken in AM of surgery or held  Patient given verbal and written preop instructions and voices comprehension and compliance.     No further testing required.      PLAN  This patient is optimally prepared for surgery.           [1]   Past Medical History:  Diagnosis Date    Anxiety     Arthritis     Asthma     Biliary disease     Depression     Disease of thyroid gland     GERD (gastroesophageal reflux disease)     Hiatal hernia     Hypothyroid    [2]   Past Surgical History:  Procedure Laterality Date    CATARACT EXTRACTION Bilateral 05/28/2024 06/11/2024    COLONOSCOPY      HIP ARTHROPLASTY      OTHER SURGICAL HISTORY  12/09/2019    Bunionectomy    OTHER SURGICAL HISTORY  12/09/2019    Hysteroscopy    OTHER SURGICAL HISTORY  12/09/2019    Loop electrosurgical excision procedure    OTHER SURGICAL HISTORY  12/09/2019    Dilation and curettage    OTHER  SURGICAL HISTORY  12/09/2019    Esophagogastroduodenoscopy    OTHER SURGICAL HISTORY  12/09/2019    Tonsillectomy    OTHER SURGICAL HISTORY  09/30/2020    Hip replacement    TONSILLECTOMY      UPPER GASTROINTESTINAL ENDOSCOPY     [3]   Family History  Problem Relation Name Age of Onset    Atrial fibrillation Mother      Dementia Mother      Diabetes Mother      Transient ischemic attack Mother      Uterine cancer Mother      Hypertension Mother      Thyroid disease Sister      Uterine cancer Sister      Diabetes Maternal Grandmother      Hypertension Maternal Grandmother      Prostate cancer Paternal Grandfather      Lung cancer Paternal Grandfather     [4]   Allergies  Allergen Reactions    Biotin Swelling    Penicillins Itching    Adhesive Tape-Silicones Unknown    Amoxicillin Itching    Pravastatin Other

## 2025-06-26 NOTE — PREPROCEDURE INSTRUCTIONS
Medication List            Accurate as of June 26, 2025 10:41 AM. Always use your most recent med list.                albuterol 90 mcg/actuation inhaler  Medication Adjustments for Surgery: Take/Use as prescribed  Notes to patient: Not using     CALCIUM CITRATE-VITAMIN D3 ORAL  Additional Medication Adjustments for Surgery: Take last dose 7 days before surgery  Notes to patient: Last day to take is 6/30 and then hold until after surgery     * chlorhexidine 0.12 % solution  Commonly known as: Peridex  Use 15 mL in the mouth or throat if needed for wound care (oral rinse the night before surgery and the morning on the day of surgery) for up to 2 doses. Swish in mouth and spit out  Medication Adjustments for Surgery: Take/Use as prescribed     * chlorhexidine 4 % external liquid  Commonly known as: Hibiclens  Apply topically once daily for 5 days. Begin using CHG for a total of 5 days before surgery Day 5 is the day of surgery See directions from the hospital  Medication Adjustments for Surgery: Take/Use as prescribed     co-enzyme Q-10 30 mg capsule  Additional Medication Adjustments for Surgery: Take last dose 7 days before surgery  Notes to patient: Last day to take is 6/30 and then hold until after surgery     diclofenac sodium 1 % gel  Commonly known as: Voltaren  Apply 4.5 inches (4 g) topically 4 times a day as needed (USE AS NEED FOR HAND PAIN).  Medication Adjustments for Surgery: Do Not take on the morning of surgery     fluticasone 50 mcg/actuation nasal spray  Commonly known as: Flonase  Administer 2 sprays into each nostril once daily.  Medication Adjustments for Surgery: Take/Use as prescribed     levothyroxine 50 mcg tablet  Commonly known as: Synthroid, Levoxyl  Take 1 tablet (50 mcg) by mouth once daily.  Medication Adjustments for Surgery: Take on the morning of surgery     loratadine 10 mg tablet  Commonly known as: Claritin  Medication Adjustments for Surgery: Take/Use as prescribed      Magnesium (oxide/AA chelate) 300 mg capsule  Generic drug: magnesium oxide-Mg AA chelate  Additional Medication Adjustments for Surgery: Take last dose 7 days before surgery  Notes to patient: Last day to take is 6/30 and then hold until after surgery     multivitamin tablet  Additional Medication Adjustments for Surgery: Take last dose 7 days before surgery  Notes to patient: Last day to take is 6/30 and then hold until after surgery     omega-3 acid ethyl esters 1 gram capsule  Commonly known as: Lovaza  Additional Medication Adjustments for Surgery: Take last dose 7 days before surgery  Notes to patient: Last day to take is 6/30 and then hold until after surgery     pantoprazole 40 mg EC tablet  Commonly known as: ProtoNix  Take 1 tablet (40 mg) by mouth 2 times a day. Do not crush, chew, or split.     PARoxetine 30 mg tablet  Commonly known as: Paxil  Take 1 tablet (30 mg) by mouth once daily.  Medication Adjustments for Surgery: Take on the morning of surgery     Vitamins B Complex capsule  Generic drug: b complex vitamins  Additional Medication Adjustments for Surgery: Take last dose 7 days before surgery  Notes to patient: Last day to take is 6/30 and then hold until after surgery           * This list has 2 medication(s) that are the same as other medications prescribed for you. Read the directions carefully, and ask your doctor or other care provider to review them with you.                                  NPO Instructions:    Do not eat any food after midnight the night before your surgery/procedure.    Additional Instructions:     Day of Surgery:  Wear  comfortable loose fitting clothing  Do not use moisturizers, creams, lotions or perfume  All jewelry and valuables should be left at home                          PRE-OPERATIVE INSTRUCTIONS FOR SURGERY    *Do not eat anything after midnight the night of surgery.  This includes food of any kind (including hard candy, cough drops, mints).   You may have  up to 13 ounces of clear liquid  until TWO hours prior to your scheduled arrival time.  Clear liquids include water, black tea/coffee, (no milk or cream) apple juice and electrolyte drinks (GATORADE).  You may chew gum until TWO hours prior you your surgery/procedure.         *One of our staff members will call you ONE business day before your surgery, between 11 am-2 pm to let you know the time to arrive.  If you have not received a call by 2 pm, call [phone]    *When you arrive at the hospital-->GO TO Registration on the ground floor  *Stop smoking 24 hours prior to surgery.  No Marijuana, CBD Oil or Vaping for 48 hours  *No alcohol 24 hours prior to surgery  *You will need a responsible adult to drive you home  -No acrylic nails or nail polish on at least one fingernail, NO polish on toes for foot surgery  -You may be asked to remove your dentures, partial plate, eyeglasses or contact lenses before going to surgery.  Please bring a case for these items.  -Body piercings need to be removed.  Jewelry and valuables should be left at home.  -Put on loose,  comfortable, clean clothing, that will accommodate bandages        What is a home antibacterial shower?  This shower is a way of cleaning the skin with a germ killing solution before surgery.  The solution contains chlorhexidine, commonly known as CHG.  CHG is a skin cleanser with germ killing ability.  Let your doctor know if you are allergic to chlorhexidine.    Why do I need to take a preoperative antibacterial shower?  Skin is not sterile.  It is best to try to make your skin as free of germs as possible before surgery.  Proper cleansing with a germ killing soap before surgery can lower the number of germs on your skin.  This helps to reduce the risk of infection at the surgical site.  Following the instructions listed below will help you prepare your skin for surgery.      How do I use the solution?    Steps: Begin using your CHG soap 5 days before your  surgery on __________________.    *First, wash and rinse your hair using the CHG soap.  Keep CHG soap away from ear canals and eyes.   Rinse completely, do not condition.  Hair extensions should be removed.    *Wash your face with your normal soap and rinse.   *Apply the CHG solution to a clean wet washcloth.  Turn the water off or move away from the water spray to avoid premature rinsing of the CHG soap as you are applying.  Firmly lather your entire body from the neck down.  Do not use on your face.    *Pay special attention to the area(s) where your incision(s) will be located unless they are on your face.  Avoid scrubbing your skin too hard.  The important part is to have the CHG soap sit on your skin for 3 minutes.   *When the 3 minutes are up, turn on the water and rinse the CHG solution off your body completely.  *Do not wash with regular soap after you  have  used the CHG soap solution.  *Pat  yourself dry with a clean, freshly laundered towel.  *Do not apply powders, deodorants or lotions.  *Dress in clean freshly laundered night clothes.    *Be sure to sleep with clean freshly laundered sheets.    *Be aware the CHG will cause stains on fabrics; if you wash them with bleach after use.  Rinse your washcloth and other linens that have contact with CHG completely.  Use only non-chlorine detergents to launder the items  used.  *The morning of surgery is the fifth day.  Repeat the above steps and dress in clean comfortable clothing.     What is oral/dental rinse?  It is mouthwash.  It is a way of cleaning the he mouth with a germ-killing solution before your surgery.  The solution contains chlorhexidine, commonly known as CHG.  It is used inside the mouth to kill a bacteria known as Staphylococcus aureus.  Let your doctor know if you are allergic to Chlorhexidine.    Why do I need to use CHG oral/ dental rinse?  The CHG oral/dental rinse helps to kill bacteria in your mouth know as Staphylococcus aureus.  This  reduces the risk of infection at the surgical site.    Using your CHG oral/dental rinse    STEPS:    Use your CHG oral/dental rinse after you brush your teeth the night before (at bedtime) and the morning of your surgery.  Follow all the directions on your prescription label.  *Use the cap on the container to measure 15 ml  *Swish (gargle if you can) the mouthwash in your mouth for at least 30 seconds, (do not swallow) and spit out  *After you use your CHG rinse, do not rinse your mouth with water, drink or eat.  Please refer to the prescription label for the appropriate time to resume oral intake.    What side effects might I have using the CHG oral/dental rinse?  CHG rinse will stick you plaque on the teeth.  Brush and floss just before use.   Teeth brushing will help avoid staining of the plaque during  use.  Teeth brushing will help avoid staining of plaque during  use.      *If you have any further questions about your pre-op instructions,  not mentioned in this handout, then call [phone]*    What you may be asked to bring to surgery:  ___Crutches, walker  ___CPAP machine  ___Urine specimen

## 2025-06-27 LAB
ATRIAL RATE: 72 BPM
P AXIS: 36 DEGREES
P OFFSET: 203 MS
P ONSET: 156 MS
PR INTERVAL: 130 MS
Q ONSET: 221 MS
QRS COUNT: 12 BEATS
QRS DURATION: 72 MS
QT INTERVAL: 392 MS
QTC CALCULATION(BAZETT): 429 MS
QTC FREDERICIA: 416 MS
R AXIS: -23 DEGREES
STAPHYLOCOCCUS SPEC CULT: NORMAL
T AXIS: 79 DEGREES
T OFFSET: 417 MS
VENTRICULAR RATE: 72 BPM

## 2025-06-30 LAB
ATRIAL RATE: 72 BPM
P AXIS: 36 DEGREES
P OFFSET: 203 MS
P ONSET: 156 MS
PR INTERVAL: 130 MS
Q ONSET: 221 MS
QRS COUNT: 12 BEATS
QRS DURATION: 72 MS
QT INTERVAL: 392 MS
QTC CALCULATION(BAZETT): 429 MS
QTC FREDERICIA: 416 MS
R AXIS: -23 DEGREES
T AXIS: 79 DEGREES
T OFFSET: 417 MS
VENTRICULAR RATE: 72 BPM

## 2025-07-02 ENCOUNTER — APPOINTMENT (OUTPATIENT)
Age: 75
End: 2025-07-02
Payer: COMMERCIAL

## 2025-07-02 VITALS
HEIGHT: 64 IN | BODY MASS INDEX: 35.77 KG/M2 | DIASTOLIC BLOOD PRESSURE: 80 MMHG | HEART RATE: 72 BPM | SYSTOLIC BLOOD PRESSURE: 124 MMHG | WEIGHT: 209.5 LBS

## 2025-07-02 DIAGNOSIS — E66.812 CLASS 2 SEVERE OBESITY DUE TO EXCESS CALORIES WITH SERIOUS COMORBIDITY AND BODY MASS INDEX (BMI) OF 35.0 TO 35.9 IN ADULT: ICD-10-CM

## 2025-07-02 DIAGNOSIS — E03.9 ACQUIRED HYPOTHYROIDISM: ICD-10-CM

## 2025-07-02 DIAGNOSIS — Z12.31 ENCOUNTER FOR SCREENING MAMMOGRAM FOR MALIGNANT NEOPLASM OF BREAST: ICD-10-CM

## 2025-07-02 DIAGNOSIS — F41.8 DEPRESSION WITH ANXIETY: ICD-10-CM

## 2025-07-02 DIAGNOSIS — R05.3 PERSISTENT COUGH: ICD-10-CM

## 2025-07-02 DIAGNOSIS — Z76.89 ENCOUNTER TO ESTABLISH CARE: Primary | ICD-10-CM

## 2025-07-02 DIAGNOSIS — E66.01 CLASS 2 SEVERE OBESITY DUE TO EXCESS CALORIES WITH SERIOUS COMORBIDITY AND BODY MASS INDEX (BMI) OF 35.0 TO 35.9 IN ADULT: ICD-10-CM

## 2025-07-02 PROBLEM — H10.13 ALLERGIC CONJUNCTIVITIS OF BOTH EYES: Status: RESOLVED | Noted: 2023-02-09 | Resolved: 2025-07-02

## 2025-07-02 PROBLEM — R25.2 LEG CRAMPS: Status: RESOLVED | Noted: 2023-02-09 | Resolved: 2025-07-02

## 2025-07-02 PROBLEM — M25.612 DECREASED SHOULDER MOBILITY, LEFT: Status: RESOLVED | Noted: 2024-11-26 | Resolved: 2025-07-02

## 2025-07-02 PROBLEM — M25.551 RIGHT HIP PAIN: Status: RESOLVED | Noted: 2023-10-18 | Resolved: 2025-07-02

## 2025-07-02 PROBLEM — M79.10 MYALGIA DUE TO STATIN: Status: ACTIVE | Noted: 2025-07-02

## 2025-07-02 PROBLEM — S53.105D ELBOW DISLOCATION, LEFT, SUBSEQUENT ENCOUNTER: Status: RESOLVED | Noted: 2024-10-11 | Resolved: 2025-07-02

## 2025-07-02 PROBLEM — S62.102A LEFT WRIST FRACTURE, CLOSED, INITIAL ENCOUNTER: Status: RESOLVED | Noted: 2024-10-04 | Resolved: 2025-07-02

## 2025-07-02 PROBLEM — J02.9 SORE THROAT: Status: RESOLVED | Noted: 2023-02-09 | Resolved: 2025-07-02

## 2025-07-02 PROBLEM — M25.632 STIFFNESS OF LEFT WRIST JOINT: Status: RESOLVED | Noted: 2024-11-06 | Resolved: 2025-07-02

## 2025-07-02 PROBLEM — T46.6X5A MYALGIA DUE TO STATIN: Status: ACTIVE | Noted: 2025-07-02

## 2025-07-02 PROBLEM — J20.9 ACUTE BRONCHITIS: Status: RESOLVED | Noted: 2023-02-09 | Resolved: 2025-07-02

## 2025-07-02 PROBLEM — J01.90 ACUTE SINUSITIS: Status: RESOLVED | Noted: 2023-02-09 | Resolved: 2025-07-02

## 2025-07-02 PROBLEM — M25.532 PAIN, WRIST, LEFT: Status: RESOLVED | Noted: 2024-11-06 | Resolved: 2025-07-02

## 2025-07-02 PROCEDURE — 1160F RVW MEDS BY RX/DR IN RCRD: CPT | Performed by: NURSE PRACTITIONER

## 2025-07-02 PROCEDURE — G2211 COMPLEX E/M VISIT ADD ON: HCPCS | Performed by: NURSE PRACTITIONER

## 2025-07-02 PROCEDURE — 1036F TOBACCO NON-USER: CPT | Performed by: NURSE PRACTITIONER

## 2025-07-02 PROCEDURE — 99214 OFFICE O/P EST MOD 30 MIN: CPT | Performed by: NURSE PRACTITIONER

## 2025-07-02 PROCEDURE — 1159F MED LIST DOCD IN RCRD: CPT | Performed by: NURSE PRACTITIONER

## 2025-07-02 RX ORDER — ALBUTEROL SULFATE 90 UG/1
2 INHALANT RESPIRATORY (INHALATION) EVERY 4 HOURS PRN
Qty: 18 G | Refills: 1 | Status: SHIPPED | OUTPATIENT
Start: 2025-07-02

## 2025-07-02 RX ORDER — PAROXETINE 30 MG/1
30 TABLET, FILM COATED ORAL DAILY
Qty: 90 TABLET | Refills: 1 | Status: SHIPPED | OUTPATIENT
Start: 2025-07-02

## 2025-07-02 RX ORDER — LEVOTHYROXINE SODIUM 50 UG/1
50 TABLET ORAL DAILY
Qty: 90 TABLET | Refills: 1 | Status: SHIPPED | OUTPATIENT
Start: 2025-07-02

## 2025-07-02 ASSESSMENT — ENCOUNTER SYMPTOMS
OCCASIONAL FEELINGS OF UNSTEADINESS: 0
CONSTITUTIONAL NEGATIVE: 1
CARDIOVASCULAR NEGATIVE: 1
NAUSEA: 1
LOSS OF SENSATION IN FEET: 0
RESPIRATORY NEGATIVE: 1
NEUROLOGICAL NEGATIVE: 1
ABDOMINAL DISTENTION: 1
DEPRESSION: 1
MUSCULOSKELETAL NEGATIVE: 1
PSYCHIATRIC NEGATIVE: 1

## 2025-07-02 ASSESSMENT — PATIENT HEALTH QUESTIONNAIRE - PHQ9
SUM OF ALL RESPONSES TO PHQ9 QUESTIONS 1 AND 2: 2
2. FEELING DOWN, DEPRESSED OR HOPELESS: SEVERAL DAYS
1. LITTLE INTEREST OR PLEASURE IN DOING THINGS: SEVERAL DAYS

## 2025-07-02 NOTE — H&P (VIEW-ONLY)
"Subjective   Patient ID: Erin Moreau is a 75 y.o. female who presents for Establish Care ([facility], [place]     Having Hiatal hernia in [place] ).    HPI   NPV  Erin here to establish care. She has history of hypothyroid, depression/anxiety, GERD (having surgery for hernia repair on the 8th),     Hypothyroid: level slightly hypo with last check. Currently on levothyroxine 50 mcg daily. Denies symptoms of hypothyroid.     Depression/anxiety: controlled on paxil. Denies SI or self harm.     GERD: currently on pantoprazole and takes TUMS. She will be getting a hernia repair on July 8th in [place] w/Dr. Chapman.    Chronic cough: uses albuterol as needed.       Mammogram: 8/16/24-ordered (would like to wait until late August due to upcoming surgery)  DEXA: 8/16/24-showed osteopenia-takes calcium/vitamin D supplement  Colonoscopy: 10/17/2023-surv 10 yrs (dov)    Review of Systems   Constitutional: Negative.    HENT:  Positive for postnasal drip.    Respiratory: Negative.     Cardiovascular: Negative.    Gastrointestinal:  Positive for abdominal distention (bloating) and nausea.   Genitourinary: Negative.    Musculoskeletal: Negative.    Skin: Negative.    Neurological: Negative.    Psychiatric/Behavioral: Negative.         Objective   /80   Pulse 72   Ht 1.626 m (5' 4\")   Wt 95 kg (209 lb 8 oz)   BMI 35.96 kg/m²     Physical Exam  Vitals and nursing note reviewed.   Constitutional:       Appearance: Normal appearance.   HENT:      Head: Normocephalic and atraumatic.   Cardiovascular:      Rate and Rhythm: Normal rate and regular rhythm.      Pulses: Normal pulses.      Heart sounds: Normal heart sounds.   Pulmonary:      Effort: Pulmonary effort is normal.      Breath sounds: Normal breath sounds.   Abdominal:      General: Bowel sounds are normal.      Palpations: Abdomen is soft.   Skin:     General: Skin is warm and dry.   Neurological:      General: No focal deficit present.      Mental Status: She is alert and " oriented to person, place, and time.   Psychiatric:         Mood and Affect: Mood normal.         Behavior: Behavior normal.         Thought Content: Thought content normal.         Judgment: Judgment normal.         Assessment/Plan   Problem List Items Addressed This Visit           ICD-10-CM    Depression with anxiety F41.8    Relevant Medications    levothyroxine (Synthroid, Levoxyl) 50 mcg tablet    PARoxetine (Paxil) 30 mg tablet    Hypothyroidism E03.9    Levothyroxine 50 mcg daily- refilled  6/26/25: TSH 4.44, free T4 0.98         Relevant Medications    levothyroxine (Synthroid, Levoxyl) 50 mcg tablet    Class 2 severe obesity due to excess calories with serious comorbidity and body mass index (BMI) of 35.0 to 35.9 in adult E66.812, E66.01, Z68.35    Persistent cough R05.3    Relevant Medications    albuterol 90 mcg/actuation inhaler     Other Visit Diagnoses         Codes      Encounter to establish care    -  Primary Z76.89      Encounter for screening mammogram for malignant neoplasm of breast     Z12.31    Relevant Orders    BI mammo bilateral screening tomosynthesis             Follow up in September for W exam. Return precautions discussed.     For any new medications that were prescribed today, the patient was educated about their indications for use, administration, frequency and potential side effects of the medication.

## 2025-07-02 NOTE — PROGRESS NOTES
"Subjective   Patient ID: Erin Moreau is a 75 y.o. female who presents for Establish Care ([facility]     Having Hiatal hernia in [place] ).    HPI   NPV  Erin here to establish care. She has history of hypothyroid, depression/anxiety, GERD (having surgery for hernia repair on the 8th),     Hypothyroid: level slightly hypo with last check. Currently on levothyroxine 50 mcg daily. Denies symptoms of hypothyroid.     Depression/anxiety: controlled on paxil. Denies SI or self harm.     GERD: currently on pantoprazole and takes TUMS. She will be getting a hernia repair on July 8th in [place] w/Dr. Chapman.    Chronic cough: uses albuterol as needed.       Mammogram: 8/16/24-ordered (would like to wait until late August due to upcoming surgery)  DEXA: 8/16/24-showed osteopenia-takes calcium/vitamin D supplement  Colonoscopy: 10/17/2023-surv 10 yrs (dov)    Review of Systems   Constitutional: Negative.    HENT:  Positive for postnasal drip.    Respiratory: Negative.     Cardiovascular: Negative.    Gastrointestinal:  Positive for abdominal distention (bloating) and nausea.   Genitourinary: Negative.    Musculoskeletal: Negative.    Skin: Negative.    Neurological: Negative.    Psychiatric/Behavioral: Negative.         Objective   /80   Pulse 72   Ht 1.626 m (5' 4\")   Wt 95 kg (209 lb 8 oz)   BMI 35.96 kg/m²     Physical Exam  Vitals and nursing note reviewed.   Constitutional:       Appearance: Normal appearance.   HENT:      Head: Normocephalic and atraumatic.   Cardiovascular:      Rate and Rhythm: Normal rate and regular rhythm.      Pulses: Normal pulses.      Heart sounds: Normal heart sounds.   Pulmonary:      Effort: Pulmonary effort is normal.      Breath sounds: Normal breath sounds.   Abdominal:      General: Bowel sounds are normal.      Palpations: Abdomen is soft.   Skin:     General: Skin is warm and dry.   Neurological:      General: No focal deficit present.      Mental Status: She is alert and " oriented to person, place, and time.   Psychiatric:         Mood and Affect: Mood normal.         Behavior: Behavior normal.         Thought Content: Thought content normal.         Judgment: Judgment normal.         Assessment/Plan   Problem List Items Addressed This Visit           ICD-10-CM    Depression with anxiety F41.8    Relevant Medications    levothyroxine (Synthroid, Levoxyl) 50 mcg tablet    PARoxetine (Paxil) 30 mg tablet    Hypothyroidism E03.9    Levothyroxine 50 mcg daily- refilled  6/26/25: TSH 4.44, free T4 0.98         Relevant Medications    levothyroxine (Synthroid, Levoxyl) 50 mcg tablet    Class 2 severe obesity due to excess calories with serious comorbidity and body mass index (BMI) of 35.0 to 35.9 in adult E66.812, E66.01, Z68.35    Persistent cough R05.3    Relevant Medications    albuterol 90 mcg/actuation inhaler     Other Visit Diagnoses         Codes      Encounter to establish care    -  Primary Z76.89      Encounter for screening mammogram for malignant neoplasm of breast     Z12.31    Relevant Orders    BI mammo bilateral screening tomosynthesis             Follow up in September for W exam. Return precautions discussed.     For any new medications that were prescribed today, the patient was educated about their indications for use, administration, frequency and potential side effects of the medication.

## 2025-07-08 ENCOUNTER — HOSPITAL ENCOUNTER (INPATIENT)
Facility: HOSPITAL | Age: 75
LOS: 1 days | Discharge: HOME | DRG: 327 | End: 2025-07-11
Attending: THORACIC SURGERY (CARDIOTHORACIC VASCULAR SURGERY) | Admitting: THORACIC SURGERY (CARDIOTHORACIC VASCULAR SURGERY)
Payer: COMMERCIAL

## 2025-07-08 ENCOUNTER — APPOINTMENT (OUTPATIENT)
Age: 75
End: 2025-07-08
Payer: COMMERCIAL

## 2025-07-08 ENCOUNTER — ANESTHESIA (OUTPATIENT)
Dept: OPERATING ROOM | Facility: HOSPITAL | Age: 75
End: 2025-07-08
Payer: COMMERCIAL

## 2025-07-08 ENCOUNTER — APPOINTMENT (OUTPATIENT)
Dept: RADIOLOGY | Facility: HOSPITAL | Age: 75
DRG: 327 | End: 2025-07-08
Payer: COMMERCIAL

## 2025-07-08 ENCOUNTER — ANESTHESIA EVENT (OUTPATIENT)
Dept: OPERATING ROOM | Facility: HOSPITAL | Age: 75
End: 2025-07-08
Payer: COMMERCIAL

## 2025-07-08 DIAGNOSIS — K21.9 GASTROESOPHAGEAL REFLUX DISEASE WITHOUT ESOPHAGITIS: ICD-10-CM

## 2025-07-08 DIAGNOSIS — R91.1 LUNG NODULE SEEN ON IMAGING STUDY: ICD-10-CM

## 2025-07-08 DIAGNOSIS — K44.9 HIATAL HERNIA: Primary | ICD-10-CM

## 2025-07-08 LAB
ANION GAP SERPL CALC-SCNC: 11 MMOL/L (ref 10–20)
BUN SERPL-MCNC: 21 MG/DL (ref 6–23)
CALCIUM SERPL-MCNC: 8.7 MG/DL (ref 8.6–10.3)
CHLORIDE SERPL-SCNC: 101 MMOL/L (ref 98–107)
CO2 SERPL-SCNC: 29 MMOL/L (ref 21–32)
CREAT SERPL-MCNC: 0.93 MG/DL (ref 0.5–1.05)
EGFRCR SERPLBLD CKD-EPI 2021: 64 ML/MIN/1.73M*2
ERYTHROCYTE [DISTWIDTH] IN BLOOD BY AUTOMATED COUNT: 13.9 % (ref 11.5–14.5)
GLUCOSE SERPL-MCNC: 139 MG/DL (ref 74–99)
HCT VFR BLD AUTO: 41.3 % (ref 36–46)
HGB BLD-MCNC: 13.1 G/DL (ref 12–16)
MAGNESIUM SERPL-MCNC: 1.76 MG/DL (ref 1.6–2.4)
MCH RBC QN AUTO: 29.8 PG (ref 26–34)
MCHC RBC AUTO-ENTMCNC: 31.7 G/DL (ref 32–36)
MCV RBC AUTO: 94 FL (ref 80–100)
NRBC BLD-RTO: 0 /100 WBCS (ref 0–0)
PHOSPHATE SERPL-MCNC: 4.7 MG/DL (ref 2.5–4.9)
PLATELET # BLD AUTO: 237 X10*3/UL (ref 150–450)
POTASSIUM SERPL-SCNC: 4.3 MMOL/L (ref 3.5–5.3)
RBC # BLD AUTO: 4.39 X10*6/UL (ref 4–5.2)
SODIUM SERPL-SCNC: 137 MMOL/L (ref 136–145)
WBC # BLD AUTO: 9.7 X10*3/UL (ref 4.4–11.3)

## 2025-07-08 PROCEDURE — 83735 ASSAY OF MAGNESIUM: CPT | Performed by: NURSE PRACTITIONER

## 2025-07-08 PROCEDURE — 2500000004 HC RX 250 GENERAL PHARMACY W/ HCPCS (ALT 636 FOR OP/ED)

## 2025-07-08 PROCEDURE — 2500000004 HC RX 250 GENERAL PHARMACY W/ HCPCS (ALT 636 FOR OP/ED): Performed by: NURSE PRACTITIONER

## 2025-07-08 PROCEDURE — 2500000001 HC RX 250 WO HCPCS SELF ADMINISTERED DRUGS (ALT 637 FOR MEDICARE OP)

## 2025-07-08 PROCEDURE — 2500000001 HC RX 250 WO HCPCS SELF ADMINISTERED DRUGS (ALT 637 FOR MEDICARE OP): Performed by: THORACIC SURGERY (CARDIOTHORACIC VASCULAR SURGERY)

## 2025-07-08 PROCEDURE — 2500000004 HC RX 250 GENERAL PHARMACY W/ HCPCS (ALT 636 FOR OP/ED): Mod: JZ | Performed by: ANESTHESIOLOGY

## 2025-07-08 PROCEDURE — 96372 THER/PROPH/DIAG INJ SC/IM: CPT | Performed by: NURSE PRACTITIONER

## 2025-07-08 PROCEDURE — 85027 COMPLETE CBC AUTOMATED: CPT | Performed by: NURSE PRACTITIONER

## 2025-07-08 PROCEDURE — 8E0W4CZ ROBOTIC ASSISTED PROCEDURE OF TRUNK REGION, PERCUTANEOUS ENDOSCOPIC APPROACH: ICD-10-PCS | Performed by: PHYSICIAN ASSISTANT

## 2025-07-08 PROCEDURE — 2500000002 HC RX 250 W HCPCS SELF ADMINISTERED DRUGS (ALT 637 FOR MEDICARE OP, ALT 636 FOR OP/ED): Performed by: NURSE PRACTITIONER

## 2025-07-08 PROCEDURE — 36415 COLL VENOUS BLD VENIPUNCTURE: CPT | Performed by: NURSE PRACTITIONER

## 2025-07-08 PROCEDURE — 2500000005 HC RX 250 GENERAL PHARMACY W/O HCPCS: Performed by: THORACIC SURGERY (CARDIOTHORACIC VASCULAR SURGERY)

## 2025-07-08 PROCEDURE — 2500000004 HC RX 250 GENERAL PHARMACY W/ HCPCS (ALT 636 FOR OP/ED): Performed by: THORACIC SURGERY (CARDIOTHORACIC VASCULAR SURGERY)

## 2025-07-08 PROCEDURE — 71045 X-RAY EXAM CHEST 1 VIEW: CPT

## 2025-07-08 PROCEDURE — 2720000007 HC OR 272 NO HCPCS: Performed by: THORACIC SURGERY (CARDIOTHORACIC VASCULAR SURGERY)

## 2025-07-08 PROCEDURE — 0BQT4ZZ REPAIR DIAPHRAGM, PERCUTANEOUS ENDOSCOPIC APPROACH: ICD-10-PCS | Performed by: PHYSICIAN ASSISTANT

## 2025-07-08 PROCEDURE — 7100000002 HC RECOVERY ROOM TIME - EACH INCREMENTAL 1 MINUTE: Performed by: THORACIC SURGERY (CARDIOTHORACIC VASCULAR SURGERY)

## 2025-07-08 PROCEDURE — 84100 ASSAY OF PHOSPHORUS: CPT | Performed by: NURSE PRACTITIONER

## 2025-07-08 PROCEDURE — 0DV44ZZ RESTRICTION OF ESOPHAGOGASTRIC JUNCTION, PERCUTANEOUS ENDOSCOPIC APPROACH: ICD-10-PCS | Performed by: PHYSICIAN ASSISTANT

## 2025-07-08 PROCEDURE — 2500000005 HC RX 250 GENERAL PHARMACY W/O HCPCS: Performed by: NURSE PRACTITIONER

## 2025-07-08 PROCEDURE — 3600000017 HC OR TIME - EACH INCREMENTAL 1 MINUTE - PROCEDURE LEVEL SIX: Performed by: THORACIC SURGERY (CARDIOTHORACIC VASCULAR SURGERY)

## 2025-07-08 PROCEDURE — 3600000018 HC OR TIME - INITIAL BASE CHARGE - PROCEDURE LEVEL SIX: Performed by: THORACIC SURGERY (CARDIOTHORACIC VASCULAR SURGERY)

## 2025-07-08 PROCEDURE — 7100000011 HC EXTENDED STAY RECOVERY HOURLY - NURSING UNIT

## 2025-07-08 PROCEDURE — 3700000002 HC GENERAL ANESTHESIA TIME - EACH INCREMENTAL 1 MINUTE: Performed by: THORACIC SURGERY (CARDIOTHORACIC VASCULAR SURGERY)

## 2025-07-08 PROCEDURE — 71045 X-RAY EXAM CHEST 1 VIEW: CPT | Performed by: RADIOLOGY

## 2025-07-08 PROCEDURE — 94760 N-INVAS EAR/PLS OXIMETRY 1: CPT

## 2025-07-08 PROCEDURE — 99223 1ST HOSP IP/OBS HIGH 75: CPT | Performed by: NURSE PRACTITIONER

## 2025-07-08 PROCEDURE — 7100000001 HC RECOVERY ROOM TIME - INITIAL BASE CHARGE: Performed by: THORACIC SURGERY (CARDIOTHORACIC VASCULAR SURGERY)

## 2025-07-08 PROCEDURE — 80048 BASIC METABOLIC PNL TOTAL CA: CPT | Performed by: NURSE PRACTITIONER

## 2025-07-08 PROCEDURE — 96372 THER/PROPH/DIAG INJ SC/IM: CPT | Performed by: THORACIC SURGERY (CARDIOTHORACIC VASCULAR SURGERY)

## 2025-07-08 PROCEDURE — 3700000001 HC GENERAL ANESTHESIA TIME - INITIAL BASE CHARGE: Performed by: THORACIC SURGERY (CARDIOTHORACIC VASCULAR SURGERY)

## 2025-07-08 PROCEDURE — 43281 LAP PARAESOPHAG HERN REPAIR: CPT | Performed by: THORACIC SURGERY (CARDIOTHORACIC VASCULAR SURGERY)

## 2025-07-08 PROCEDURE — 43281 LAP PARAESOPHAG HERN REPAIR: CPT | Performed by: PHYSICIAN ASSISTANT

## 2025-07-08 RX ORDER — FAMOTIDINE 10 MG/ML
20 INJECTION, SOLUTION INTRAVENOUS ONCE
Status: DISCONTINUED | OUTPATIENT
Start: 2025-07-08 | End: 2025-07-08 | Stop reason: HOSPADM

## 2025-07-08 RX ORDER — SCOPOLAMINE 1 MG/3D
1 PATCH, EXTENDED RELEASE TRANSDERMAL ONCE
Status: DISCONTINUED | OUTPATIENT
Start: 2025-07-08 | End: 2025-07-08 | Stop reason: HOSPADM

## 2025-07-08 RX ORDER — GABAPENTIN 300 MG/1
300 CAPSULE ORAL ONCE
Status: COMPLETED | OUTPATIENT
Start: 2025-07-08 | End: 2025-07-08

## 2025-07-08 RX ORDER — BUPIVACAINE HYDROCHLORIDE 2.5 MG/ML
INJECTION, SOLUTION INFILTRATION; PERINEURAL AS NEEDED
Status: DISCONTINUED | OUTPATIENT
Start: 2025-07-08 | End: 2025-07-08 | Stop reason: HOSPADM

## 2025-07-08 RX ORDER — SODIUM CHLORIDE, SODIUM LACTATE, POTASSIUM CHLORIDE, CALCIUM CHLORIDE 600; 310; 30; 20 MG/100ML; MG/100ML; MG/100ML; MG/100ML
100 INJECTION, SOLUTION INTRAVENOUS CONTINUOUS
Status: DISCONTINUED | OUTPATIENT
Start: 2025-07-08 | End: 2025-07-08 | Stop reason: HOSPADM

## 2025-07-08 RX ORDER — MIDAZOLAM HYDROCHLORIDE 1 MG/ML
1 INJECTION, SOLUTION INTRAMUSCULAR; INTRAVENOUS ONCE AS NEEDED
Status: DISCONTINUED | OUTPATIENT
Start: 2025-07-08 | End: 2025-07-08 | Stop reason: HOSPADM

## 2025-07-08 RX ORDER — ALBUTEROL SULFATE 90 UG/1
2 INHALANT RESPIRATORY (INHALATION) EVERY 4 HOURS PRN
Status: DISCONTINUED | OUTPATIENT
Start: 2025-07-08 | End: 2025-07-11 | Stop reason: HOSPADM

## 2025-07-08 RX ORDER — ALBUTEROL SULFATE 90 UG/1
INHALANT RESPIRATORY (INHALATION) AS NEEDED
Status: DISCONTINUED | OUTPATIENT
Start: 2025-07-08 | End: 2025-07-08

## 2025-07-08 RX ORDER — DIPHENHYDRAMINE HYDROCHLORIDE 50 MG/ML
25 INJECTION, SOLUTION INTRAMUSCULAR; INTRAVENOUS ONCE AS NEEDED
Status: DISCONTINUED | OUTPATIENT
Start: 2025-07-08 | End: 2025-07-08 | Stop reason: HOSPADM

## 2025-07-08 RX ORDER — CEFAZOLIN SODIUM 2 G/50ML
2 SOLUTION INTRAVENOUS ONCE
Status: COMPLETED | OUTPATIENT
Start: 2025-07-08 | End: 2025-07-08

## 2025-07-08 RX ORDER — LIDOCAINE HCL/PF 100 MG/5ML
SYRINGE (ML) INTRAVENOUS AS NEEDED
Status: DISCONTINUED | OUTPATIENT
Start: 2025-07-08 | End: 2025-07-08

## 2025-07-08 RX ORDER — FENTANYL CITRATE 50 UG/ML
INJECTION, SOLUTION INTRAMUSCULAR; INTRAVENOUS AS NEEDED
Status: DISCONTINUED | OUTPATIENT
Start: 2025-07-08 | End: 2025-07-08

## 2025-07-08 RX ORDER — HYDROMORPHONE HYDROCHLORIDE 1 MG/ML
1 INJECTION, SOLUTION INTRAMUSCULAR; INTRAVENOUS; SUBCUTANEOUS EVERY 5 MIN PRN
Status: DISCONTINUED | OUTPATIENT
Start: 2025-07-08 | End: 2025-07-08 | Stop reason: HOSPADM

## 2025-07-08 RX ORDER — ENOXAPARIN SODIUM 100 MG/ML
40 INJECTION SUBCUTANEOUS EVERY 24 HOURS
Status: DISCONTINUED | OUTPATIENT
Start: 2025-07-08 | End: 2025-07-11 | Stop reason: HOSPADM

## 2025-07-08 RX ORDER — METOCLOPRAMIDE HYDROCHLORIDE 5 MG/ML
10 INJECTION INTRAMUSCULAR; INTRAVENOUS EVERY 6 HOURS PRN
Status: DISCONTINUED | OUTPATIENT
Start: 2025-07-08 | End: 2025-07-11 | Stop reason: HOSPADM

## 2025-07-08 RX ORDER — LEVOTHYROXINE SODIUM ANHYDROUS 100 UG/5ML
25 INJECTION, POWDER, LYOPHILIZED, FOR SOLUTION INTRAVENOUS
Status: DISCONTINUED | OUTPATIENT
Start: 2025-07-09 | End: 2025-07-10

## 2025-07-08 RX ORDER — LABETALOL HYDROCHLORIDE 5 MG/ML
10 INJECTION, SOLUTION INTRAVENOUS ONCE AS NEEDED
Status: DISCONTINUED | OUTPATIENT
Start: 2025-07-08 | End: 2025-07-08 | Stop reason: HOSPADM

## 2025-07-08 RX ORDER — ROCURONIUM BROMIDE 10 MG/ML
INJECTION, SOLUTION INTRAVENOUS AS NEEDED
Status: DISCONTINUED | OUTPATIENT
Start: 2025-07-08 | End: 2025-07-08

## 2025-07-08 RX ORDER — ONDANSETRON HYDROCHLORIDE 2 MG/ML
4 INJECTION, SOLUTION INTRAVENOUS ONCE AS NEEDED
Status: DISCONTINUED | OUTPATIENT
Start: 2025-07-08 | End: 2025-07-08 | Stop reason: HOSPADM

## 2025-07-08 RX ORDER — FLUTICASONE PROPIONATE 50 MCG
2 SPRAY, SUSPENSION (ML) NASAL DAILY
Status: DISCONTINUED | OUTPATIENT
Start: 2025-07-08 | End: 2025-07-11 | Stop reason: HOSPADM

## 2025-07-08 RX ORDER — HEPARIN SODIUM 5000 [USP'U]/ML
5000 INJECTION, SOLUTION INTRAVENOUS; SUBCUTANEOUS ONCE
Status: COMPLETED | OUTPATIENT
Start: 2025-07-08 | End: 2025-07-08

## 2025-07-08 RX ORDER — LABETALOL HYDROCHLORIDE 5 MG/ML
INJECTION, SOLUTION INTRAVENOUS AS NEEDED
Status: DISCONTINUED | OUTPATIENT
Start: 2025-07-08 | End: 2025-07-08

## 2025-07-08 RX ORDER — MORPHINE SULFATE 2 MG/ML
2 INJECTION, SOLUTION INTRAMUSCULAR; INTRAVENOUS EVERY 5 MIN PRN
Status: DISCONTINUED | OUTPATIENT
Start: 2025-07-08 | End: 2025-07-08 | Stop reason: HOSPADM

## 2025-07-08 RX ORDER — METOCLOPRAMIDE HYDROCHLORIDE 5 MG/ML
10 INJECTION INTRAMUSCULAR; INTRAVENOUS ONCE AS NEEDED
Status: DISCONTINUED | OUTPATIENT
Start: 2025-07-08 | End: 2025-07-08 | Stop reason: HOSPADM

## 2025-07-08 RX ORDER — ALBUTEROL SULFATE 0.83 MG/ML
2.5 SOLUTION RESPIRATORY (INHALATION) ONCE AS NEEDED
Status: DISCONTINUED | OUTPATIENT
Start: 2025-07-08 | End: 2025-07-08 | Stop reason: HOSPADM

## 2025-07-08 RX ORDER — PROPOFOL 10 MG/ML
INJECTION, EMULSION INTRAVENOUS AS NEEDED
Status: DISCONTINUED | OUTPATIENT
Start: 2025-07-08 | End: 2025-07-08

## 2025-07-08 RX ORDER — WATER 1 ML/ML
INJECTION IRRIGATION AS NEEDED
Status: DISCONTINUED | OUTPATIENT
Start: 2025-07-08 | End: 2025-07-08 | Stop reason: HOSPADM

## 2025-07-08 RX ORDER — ACETAMINOPHEN 325 MG/1
650 TABLET ORAL ONCE
Status: COMPLETED | OUTPATIENT
Start: 2025-07-08 | End: 2025-07-08

## 2025-07-08 RX ORDER — HYDROMORPHONE HYDROCHLORIDE 1 MG/ML
INJECTION, SOLUTION INTRAMUSCULAR; INTRAVENOUS; SUBCUTANEOUS CONTINUOUS PRN
Status: DISCONTINUED | OUTPATIENT
Start: 2025-07-08 | End: 2025-07-08

## 2025-07-08 RX ORDER — PANTOPRAZOLE SODIUM 40 MG/10ML
40 INJECTION, POWDER, LYOPHILIZED, FOR SOLUTION INTRAVENOUS DAILY
Status: DISCONTINUED | OUTPATIENT
Start: 2025-07-08 | End: 2025-07-10

## 2025-07-08 RX ORDER — ONDANSETRON HYDROCHLORIDE 2 MG/ML
4 INJECTION, SOLUTION INTRAVENOUS EVERY 8 HOURS PRN
Status: DISCONTINUED | OUTPATIENT
Start: 2025-07-08 | End: 2025-07-11 | Stop reason: HOSPADM

## 2025-07-08 RX ORDER — HYDROMORPHONE HCL/0.9% NACL/PF 15 MG/30ML
PATIENT CONTROLLED ANALGESIA SYRINGE INTRAVENOUS CONTINUOUS
Status: DISCONTINUED | OUTPATIENT
Start: 2025-07-08 | End: 2025-07-10

## 2025-07-08 RX ORDER — ONDANSETRON 4 MG/1
4 TABLET, ORALLY DISINTEGRATING ORAL EVERY 8 HOURS PRN
Status: DISCONTINUED | OUTPATIENT
Start: 2025-07-08 | End: 2025-07-11 | Stop reason: HOSPADM

## 2025-07-08 RX ORDER — ACETAMINOPHEN 325 MG/1
975 TABLET ORAL ONCE
Status: DISCONTINUED | OUTPATIENT
Start: 2025-07-08 | End: 2025-07-08 | Stop reason: HOSPADM

## 2025-07-08 RX ORDER — ONDANSETRON HYDROCHLORIDE 2 MG/ML
INJECTION, SOLUTION INTRAVENOUS AS NEEDED
Status: DISCONTINUED | OUTPATIENT
Start: 2025-07-08 | End: 2025-07-08

## 2025-07-08 RX ORDER — NALOXONE HYDROCHLORIDE 1 MG/ML
0.2 INJECTION INTRAMUSCULAR; INTRAVENOUS; SUBCUTANEOUS AS NEEDED
Status: DISCONTINUED | OUTPATIENT
Start: 2025-07-08 | End: 2025-07-11 | Stop reason: HOSPADM

## 2025-07-08 RX ORDER — HYDRALAZINE HYDROCHLORIDE 20 MG/ML
10 INJECTION INTRAMUSCULAR; INTRAVENOUS EVERY 30 MIN PRN
Status: DISCONTINUED | OUTPATIENT
Start: 2025-07-08 | End: 2025-07-08 | Stop reason: HOSPADM

## 2025-07-08 RX ORDER — METOPROLOL TARTRATE 1 MG/ML
5 INJECTION, SOLUTION INTRAVENOUS ONCE
Status: DISCONTINUED | OUTPATIENT
Start: 2025-07-08 | End: 2025-07-08 | Stop reason: HOSPADM

## 2025-07-08 RX ADMIN — PANTOPRAZOLE SODIUM 40 MG: 40 INJECTION, POWDER, FOR SOLUTION INTRAVENOUS at 15:14

## 2025-07-08 RX ADMIN — POVIDONE-IODINE 1 APPLICATION: 5 SOLUTION TOPICAL at 06:47

## 2025-07-08 RX ADMIN — LIDOCAINE HYDROCHLORIDE 60 MG: 20 INJECTION, SOLUTION INTRAVENOUS at 07:43

## 2025-07-08 RX ADMIN — SODIUM CHLORIDE, POTASSIUM CHLORIDE, SODIUM LACTATE AND CALCIUM CHLORIDE: 600; 310; 30; 20 INJECTION, SOLUTION INTRAVENOUS at 07:35

## 2025-07-08 RX ADMIN — ALBUTEROL SULFATE 8 PUFF: 90 AEROSOL, METERED RESPIRATORY (INHALATION) at 08:00

## 2025-07-08 RX ADMIN — MORPHINE SULFATE 2 MG: 2 INJECTION, SOLUTION INTRAMUSCULAR; INTRAVENOUS at 10:32

## 2025-07-08 RX ADMIN — PROPOFOL 150 MG: 10 INJECTION, EMULSION INTRAVENOUS at 07:43

## 2025-07-08 RX ADMIN — MORPHINE SULFATE 2 MG: 2 INJECTION, SOLUTION INTRAMUSCULAR; INTRAVENOUS at 10:20

## 2025-07-08 RX ADMIN — SUGAMMADEX 200 MG: 100 INJECTION, SOLUTION INTRAVENOUS at 09:34

## 2025-07-08 RX ADMIN — HYDROMORPHONE HYDROCHLORIDE 1 MG: 1 INJECTION, SOLUTION INTRAMUSCULAR; INTRAVENOUS; SUBCUTANEOUS at 11:00

## 2025-07-08 RX ADMIN — HEPARIN SODIUM 5000 UNITS: 5000 INJECTION, SOLUTION INTRAVENOUS; SUBCUTANEOUS at 06:46

## 2025-07-08 RX ADMIN — ENOXAPARIN SODIUM 40 MG: 40 INJECTION SUBCUTANEOUS at 20:36

## 2025-07-08 RX ADMIN — ROCURONIUM BROMIDE 20 MG: 10 INJECTION, SOLUTION INTRAVENOUS at 08:05

## 2025-07-08 RX ADMIN — FENTANYL CITRATE 50 MCG: 50 INJECTION, SOLUTION INTRAMUSCULAR; INTRAVENOUS at 07:43

## 2025-07-08 RX ADMIN — MORPHINE SULFATE 2 MG: 2 INJECTION, SOLUTION INTRAMUSCULAR; INTRAVENOUS at 10:11

## 2025-07-08 RX ADMIN — Medication 1.5 L/MIN: at 15:22

## 2025-07-08 RX ADMIN — ACETAMINOPHEN 650 MG: 325 TABLET ORAL at 06:46

## 2025-07-08 RX ADMIN — CEFAZOLIN SODIUM 2 G: 2 SOLUTION INTRAVENOUS at 07:49

## 2025-07-08 RX ADMIN — MORPHINE SULFATE 2 MG: 2 INJECTION, SOLUTION INTRAMUSCULAR; INTRAVENOUS at 10:25

## 2025-07-08 RX ADMIN — FENTANYL CITRATE 50 MCG: 50 INJECTION, SOLUTION INTRAMUSCULAR; INTRAVENOUS at 07:52

## 2025-07-08 RX ADMIN — MORPHINE SULFATE 2 MG: 2 INJECTION, SOLUTION INTRAMUSCULAR; INTRAVENOUS at 10:00

## 2025-07-08 RX ADMIN — SODIUM CHLORIDE, POTASSIUM CHLORIDE, SODIUM LACTATE AND CALCIUM CHLORIDE: 600; 310; 30; 20 INJECTION, SOLUTION INTRAVENOUS at 09:28

## 2025-07-08 RX ADMIN — FLUTICASONE PROPIONATE 2 SPRAY: 50 SPRAY, METERED NASAL at 20:36

## 2025-07-08 RX ADMIN — MORPHINE SULFATE 2 MG: 2 INJECTION, SOLUTION INTRAMUSCULAR; INTRAVENOUS at 10:06

## 2025-07-08 RX ADMIN — MORPHINE SULFATE 2 MG: 2 INJECTION, SOLUTION INTRAMUSCULAR; INTRAVENOUS at 09:54

## 2025-07-08 RX ADMIN — ONDANSETRON 4 MG: 2 INJECTION INTRAMUSCULAR; INTRAVENOUS at 09:21

## 2025-07-08 RX ADMIN — GABAPENTIN 300 MG: 300 CAPSULE ORAL at 06:46

## 2025-07-08 RX ADMIN — ROCURONIUM BROMIDE 50 MG: 10 INJECTION, SOLUTION INTRAVENOUS at 07:44

## 2025-07-08 RX ADMIN — HYDROMORPHONE HYDROCHLORIDE 1 MG: 1 INJECTION, SOLUTION INTRAMUSCULAR; INTRAVENOUS; SUBCUTANEOUS at 10:41

## 2025-07-08 RX ADMIN — DEXAMETHASONE SODIUM PHOSPHATE 4 MG: 4 INJECTION, SOLUTION INTRAMUSCULAR; INTRAVENOUS at 07:52

## 2025-07-08 RX ADMIN — LABETALOL HYDROCHLORIDE 10 MG: 5 INJECTION, SOLUTION INTRAVENOUS at 09:37

## 2025-07-08 RX ADMIN — ROCURONIUM BROMIDE 20 MG: 10 INJECTION, SOLUTION INTRAVENOUS at 08:40

## 2025-07-08 RX ADMIN — HYDROMORPHONE HYDROCHLORIDE: 10 INJECTION, SOLUTION INTRAMUSCULAR; INTRAVENOUS; SUBCUTANEOUS at 11:49

## 2025-07-08 RX ADMIN — Medication 1 L/MIN: at 20:38

## 2025-07-08 SDOH — HEALTH STABILITY: MENTAL HEALTH: HOW OFTEN DO YOU HAVE A DRINK CONTAINING ALCOHOL?: NEVER

## 2025-07-08 SDOH — SOCIAL STABILITY: SOCIAL INSECURITY: HAVE YOU HAD ANY THOUGHTS OF HARMING ANYONE ELSE?: NO

## 2025-07-08 SDOH — SOCIAL STABILITY: SOCIAL INSECURITY: WITHIN THE LAST YEAR, HAVE YOU BEEN HUMILIATED OR EMOTIONALLY ABUSED IN OTHER WAYS BY YOUR PARTNER OR EX-PARTNER?: NO

## 2025-07-08 SDOH — ECONOMIC STABILITY: FOOD INSECURITY: WITHIN THE PAST 12 MONTHS, YOU WORRIED THAT YOUR FOOD WOULD RUN OUT BEFORE YOU GOT THE MONEY TO BUY MORE.: NEVER TRUE

## 2025-07-08 SDOH — HEALTH STABILITY: MENTAL HEALTH: HOW MANY DRINKS CONTAINING ALCOHOL DO YOU HAVE ON A TYPICAL DAY WHEN YOU ARE DRINKING?: PATIENT DOES NOT DRINK

## 2025-07-08 SDOH — ECONOMIC STABILITY: HOUSING INSECURITY: IN THE LAST 12 MONTHS, WAS THERE A TIME WHEN YOU WERE NOT ABLE TO PAY THE MORTGAGE OR RENT ON TIME?: NO

## 2025-07-08 SDOH — ECONOMIC STABILITY: HOUSING INSECURITY: AT ANY TIME IN THE PAST 12 MONTHS, WERE YOU HOMELESS OR LIVING IN A SHELTER (INCLUDING NOW)?: NO

## 2025-07-08 SDOH — SOCIAL STABILITY: SOCIAL INSECURITY: WERE YOU ABLE TO COMPLETE ALL THE BEHAVIORAL HEALTH SCREENINGS?: YES

## 2025-07-08 SDOH — ECONOMIC STABILITY: FOOD INSECURITY: WITHIN THE PAST 12 MONTHS, THE FOOD YOU BOUGHT JUST DIDN'T LAST AND YOU DIDN'T HAVE MONEY TO GET MORE.: NEVER TRUE

## 2025-07-08 SDOH — ECONOMIC STABILITY: INCOME INSECURITY: IN THE PAST 12 MONTHS HAS THE ELECTRIC, GAS, OIL, OR WATER COMPANY THREATENED TO SHUT OFF SERVICES IN YOUR HOME?: NO

## 2025-07-08 SDOH — SOCIAL STABILITY: SOCIAL INSECURITY: WITHIN THE LAST YEAR, HAVE YOU BEEN AFRAID OF YOUR PARTNER OR EX-PARTNER?: NO

## 2025-07-08 SDOH — SOCIAL STABILITY: SOCIAL INSECURITY: HAS ANYONE EVER THREATENED TO HURT YOUR FAMILY OR YOUR PETS?: NO

## 2025-07-08 SDOH — HEALTH STABILITY: MENTAL HEALTH: HOW OFTEN DO YOU HAVE SIX OR MORE DRINKS ON ONE OCCASION?: NEVER

## 2025-07-08 SDOH — SOCIAL STABILITY: SOCIAL INSECURITY: ARE THERE ANY APPARENT SIGNS OF INJURIES/BEHAVIORS THAT COULD BE RELATED TO ABUSE/NEGLECT?: NO

## 2025-07-08 SDOH — ECONOMIC STABILITY: FOOD INSECURITY: HOW HARD IS IT FOR YOU TO PAY FOR THE VERY BASICS LIKE FOOD, HOUSING, MEDICAL CARE, AND HEATING?: NOT HARD AT ALL

## 2025-07-08 SDOH — SOCIAL STABILITY: SOCIAL INSECURITY
WITHIN THE LAST YEAR, HAVE YOU BEEN RAPED OR FORCED TO HAVE ANY KIND OF SEXUAL ACTIVITY BY YOUR PARTNER OR EX-PARTNER?: NO

## 2025-07-08 SDOH — SOCIAL STABILITY: SOCIAL INSECURITY: ABUSE: ADULT

## 2025-07-08 SDOH — SOCIAL STABILITY: SOCIAL INSECURITY: DO YOU FEEL ANYONE HAS EXPLOITED OR TAKEN ADVANTAGE OF YOU FINANCIALLY OR OF YOUR PERSONAL PROPERTY?: NO

## 2025-07-08 SDOH — ECONOMIC STABILITY: TRANSPORTATION INSECURITY: IN THE PAST 12 MONTHS, HAS LACK OF TRANSPORTATION KEPT YOU FROM MEDICAL APPOINTMENTS OR FROM GETTING MEDICATIONS?: NO

## 2025-07-08 SDOH — HEALTH STABILITY: MENTAL HEALTH: CURRENT SMOKER: 0

## 2025-07-08 SDOH — SOCIAL STABILITY: SOCIAL INSECURITY
WITHIN THE LAST YEAR, HAVE YOU BEEN KICKED, HIT, SLAPPED, OR OTHERWISE PHYSICALLY HURT BY YOUR PARTNER OR EX-PARTNER?: NO

## 2025-07-08 SDOH — SOCIAL STABILITY: SOCIAL INSECURITY: ARE YOU OR HAVE YOU BEEN THREATENED OR ABUSED PHYSICALLY, EMOTIONALLY, OR SEXUALLY BY ANYONE?: NO

## 2025-07-08 SDOH — SOCIAL STABILITY: SOCIAL INSECURITY: DOES ANYONE TRY TO KEEP YOU FROM HAVING/CONTACTING OTHER FRIENDS OR DOING THINGS OUTSIDE YOUR HOME?: NO

## 2025-07-08 SDOH — ECONOMIC STABILITY: HOUSING INSECURITY: IN THE PAST 12 MONTHS, HOW MANY TIMES HAVE YOU MOVED WHERE YOU WERE LIVING?: 0

## 2025-07-08 SDOH — SOCIAL STABILITY: SOCIAL INSECURITY: HAVE YOU HAD THOUGHTS OF HARMING ANYONE ELSE?: NO

## 2025-07-08 SDOH — SOCIAL STABILITY: SOCIAL INSECURITY: DO YOU FEEL UNSAFE GOING BACK TO THE PLACE WHERE YOU ARE LIVING?: NO

## 2025-07-08 ASSESSMENT — PAIN SCALES - GENERAL
PAINLEVEL_OUTOF10: 7
PAINLEVEL_OUTOF10: 0 - NO PAIN
PAINLEVEL_OUTOF10: 5 - MODERATE PAIN
PAINLEVEL_OUTOF10: 0 - NO PAIN
PAINLEVEL_OUTOF10: 0 - NO PAIN
PAINLEVEL_OUTOF10: 3
PAINLEVEL_OUTOF10: 0 - NO PAIN
PAINLEVEL_OUTOF10: 0 - NO PAIN
PAINLEVEL_OUTOF10: 5 - MODERATE PAIN
PAINLEVEL_OUTOF10: 0 - NO PAIN
PAINLEVEL_OUTOF10: 5 - MODERATE PAIN
PAINLEVEL_OUTOF10: 0 - NO PAIN
PAINLEVEL_OUTOF10: 5 - MODERATE PAIN
PAINLEVEL_OUTOF10: 5 - MODERATE PAIN
PAINLEVEL_OUTOF10: 0 - NO PAIN
PAINLEVEL_OUTOF10: 5 - MODERATE PAIN
PAINLEVEL_OUTOF10: 5 - MODERATE PAIN
PAINLEVEL_OUTOF10: 0 - NO PAIN
PAINLEVEL_OUTOF10: 0 - NO PAIN
PAINLEVEL_OUTOF10: 5 - MODERATE PAIN
PAINLEVEL_OUTOF10: 0 - NO PAIN
PAINLEVEL_OUTOF10: 0 - NO PAIN
PAINLEVEL_OUTOF10: 5 - MODERATE PAIN
PAINLEVEL_OUTOF10: 0 - NO PAIN

## 2025-07-08 ASSESSMENT — PAIN - FUNCTIONAL ASSESSMENT

## 2025-07-08 ASSESSMENT — PAIN DESCRIPTION - DESCRIPTORS
DESCRIPTORS: OTHER (COMMENT)

## 2025-07-08 ASSESSMENT — ACTIVITIES OF DAILY LIVING (ADL)
BATHING: NEEDS ASSISTANCE
FEEDING YOURSELF: INDEPENDENT
JUDGMENT_ADEQUATE_SAFELY_COMPLETE_DAILY_ACTIVITIES: YES
HEARING - LEFT EAR: FUNCTIONAL
ADEQUATE_TO_COMPLETE_ADL: NO
TOILETING: NEEDS ASSISTANCE
HEARING - RIGHT EAR: FUNCTIONAL
WALKS IN HOME: NEEDS ASSISTANCE
GROOMING: NEEDS ASSISTANCE
LACK_OF_TRANSPORTATION: NO
DRESSING YOURSELF: NEEDS ASSISTANCE
PATIENT'S MEMORY ADEQUATE TO SAFELY COMPLETE DAILY ACTIVITIES?: YES

## 2025-07-08 ASSESSMENT — COGNITIVE AND FUNCTIONAL STATUS - GENERAL
MOVING FROM LYING ON BACK TO SITTING ON SIDE OF FLAT BED WITH BEDRAILS: A LITTLE
HELP NEEDED FOR BATHING: A LITTLE
DAILY ACTIVITIY SCORE: 21
PATIENT BASELINE BEDBOUND: NO
DRESSING REGULAR LOWER BODY CLOTHING: A LITTLE
STANDING UP FROM CHAIR USING ARMS: A LITTLE
MOBILITY SCORE: 16
MOVING TO AND FROM BED TO CHAIR: A LITTLE
TOILETING: A LITTLE
TURNING FROM BACK TO SIDE WHILE IN FLAT BAD: A LITTLE
WALKING IN HOSPITAL ROOM: A LOT
CLIMB 3 TO 5 STEPS WITH RAILING: A LOT

## 2025-07-08 ASSESSMENT — PAIN DESCRIPTION - LOCATION
LOCATION: STERNUM

## 2025-07-08 ASSESSMENT — PATIENT HEALTH QUESTIONNAIRE - PHQ9
SUM OF ALL RESPONSES TO PHQ9 QUESTIONS 1 & 2: 0
2. FEELING DOWN, DEPRESSED OR HOPELESS: NOT AT ALL
1. LITTLE INTEREST OR PLEASURE IN DOING THINGS: NOT AT ALL

## 2025-07-08 ASSESSMENT — LIFESTYLE VARIABLES
AUDIT-C TOTAL SCORE: 0
HOW OFTEN DO YOU HAVE A DRINK CONTAINING ALCOHOL: NEVER
SKIP TO QUESTIONS 9-10: 1

## 2025-07-08 ASSESSMENT — ENCOUNTER SYMPTOMS: SHORTNESS OF BREATH: 0

## 2025-07-08 ASSESSMENT — PAIN DESCRIPTION - ORIENTATION: ORIENTATION: UPPER

## 2025-07-08 NOTE — OP NOTE
"Patient: Dunia Moreau  : 1950  MRN: [id number]    Preoperative Diagnosis: Hiatal Hernia  Postoperative Diagnosis: Hiatal Hernia    Procedure: Upper Endoscopy, Robotic Repair of Hiatal Hernia, partial fundoplication    Surgeon: Surgeon(s):  ANNIKA Perry MD    Other Staff:   Surgeons and Role:     * Sarai Camargo PA-C - JOEY First Assist   Circulator: Loretta Desai RN  Scrub Person: Alis Orantes    Anesthesia Type: General anesthesia    Indications: Dunia Moreau \"Erin\" is an 75 y.o. female who is having hiatal hernia repair for symptomatic hiatal hernia.    The patient was seen in the preoperative area. The risks, benefits, complications, treatment options, non-operative alternatives, expected recovery and outcomes were discussed with the patient. The possibilities of reaction to medication, pulmonary aspiration, injury to surrounding structures, bleeding, recurrent infection, the need for additional procedures, failure to diagnose a condition, and creating a complication requiring transfusion or operation were discussed with the patient. The patient concurred with the proposed plan, giving informed consent.      Procedure Details: The patient was brought to the operating room placed in the operating table in supine position.  General endotracheal anesthesia was established.  We prepped and draped the patient.  We entered the abdomen with a \"Visiport\" entry in the right upper quadrant.  We insufflated the abdomen.  I made four 8 mm robotic port incisions.  I also used a 5 mm port for a liver retractor.  We placed the patient in steep reverse Trendelenburg.  We docked the robot and explored the abdomen.  There was a Medium hiatal hernia.  I performed a careful reduction of the stomach into the abdomen.  After I had reduced this out of the chest, I Identified the right crura.  I started dissecting the hernia sac away from the crura on the right side and then " "retracted the hernia sac into the abdomen.  I dissected the hernia sac free of the mediastinum which facilitated reduction of the hernia and exposure of the esophagus.   I performed a dissection of the esophagus in the mediastinum to facilitate delivery of the GE junction into the abdomen.  I was able to circumferentially take down the hernia sac working from right to left until the esophagus was fully mobilized and the GE junction was in the abdomen.  Once I had completely dissected the hernia sac and mobilized the GE junction, I resected the hernia sac and discarded it. I performed an esophagoscopy to confirm the location of the GE junction. This was well within the abdomen. There were no tumors or injuries seen endoscopically.  I then performed a crural closure. To size the crural closure, I used a 56 [place] bougie. The crura was closed with 0 nonabsorbable V-Loc suture.  No mesh was used in the procedure. I felt that partial fundoplication was appropriate and performed a posterior 270 degree, Toupe style fundoplication. This was secured with interrupted Ethibond suture.  I evaluated the patient for hemostasis and felt it was appropriate. We removed the liver retractor.  We desufflated the abdomen, removed the ports, and closed the wounds in layers.  Local analgesia was used. The patient was extubated and brought to recovery without evidence of immediate complications.    The physicians assistant in this case was involved because no suitable resident was available to perform the \"bedside assist\" role. The physicians assistant in this case was involved in the actual performance of the covered surgical procedure and not simply present to perform ancillary services          Complications: none   Disposition and Condition: PACU - hemodynamically stable.    Attending Attestation: I was present and scrubbed for the entire procedure.    Jairon Chapman  Phone Number: [phone]      "

## 2025-07-08 NOTE — CARE PLAN
The patient's goals for the shift include      The clinical goals for the shift include  Problem: Pain - Adult  Goal: Verbalizes/displays adequate comfort level or baseline comfort level  Outcome: Progressing     Problem: Safety - Adult  Goal: Free from fall injury  Outcome: Progressing     Problem: Discharge Planning  Goal: Discharge to home or other facility with appropriate resources  Outcome: Progressing     Problem: Chronic Conditions and Co-morbidities  Goal: Patient's chronic conditions and co-morbidity symptoms are monitored and maintained or improved  Outcome: Progressing     Problem: Nutrition  Goal: Nutrient intake appropriate for maintaining nutritional needs  Outcome: Progressing     Problem: Skin  Goal: Decreased wound size/increased tissue granulation at next dressing change  7/8/2025 1641 by Brianna Marques RN  Outcome: Progressing  7/8/2025 1640 by Brianna Marques RN  Flowsheets (Taken 7/8/2025 1640)  Decreased wound size/increased tissue granulation at next dressing change: Promote sleep for wound healing  Goal: Promote/optimize nutrition  7/8/2025 1641 by Brianna Marques RN  Outcome: Progressing  7/8/2025 1640 by Brianna Marques RN  Flowsheets (Taken 7/8/2025 1640)  Promote/optimize nutrition: Monitor/record intake including meals

## 2025-07-08 NOTE — INTERVAL H&P NOTE
H&P reviewed. The patient was examined and there are no changes to the H&P.    Hiatal hernia repair today. We discussed the surgery in detail. I carefully described the risks, benefits, and alternatives to surgical intervention. We discussed the possibility of serious complications including death. I answered any questions they expressed. After this, the patient agreed to proceed with surgery

## 2025-07-08 NOTE — ANESTHESIA PROCEDURE NOTES
Airway  Date/Time: 7/8/2025 7:46 AM  Reason: elective    Airway not difficult    Staffing  Performed: CRNA   Authorized by: Jean Carlos Haile MD    Performed by: JOSE ELIAS Tejada-CRNA  Patient location during procedure: OR    Patient Condition  Indications for airway management: anesthesia  Patient position: sniffing  Planned trial extubation  Sedation level: deep     Final Airway Details   Preoxygenated: yes  Final airway type: endotracheal airway  Successful airway: ETT  Cuffed: yes   Successful intubation technique: video laryngoscopy (Alvarez 4)  Adjuncts used in placement: intubating stylet  Endotracheal tube insertion site: oral  Blade type: Alvarez.  Blade size: #4  ETT size (mm): 7.5  Cormack-Lehane Classification: grade IIa - partial view of glottis  Placement verified by: chest auscultation, capnometry and palpation of cuff   Cuff volume (mL): 7  Measured from: lips  ETT to lips (cm): 21  Ventilation between attempts: none  Number of attempts at approach: 1  Number of other approaches attempted: 0    Additional Comments  Elective Alvarez 4 use.

## 2025-07-08 NOTE — ANESTHESIA PREPROCEDURE EVALUATION
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      GI   (+) Esophageal dysphagia   (+) Gastroesophageal reflux disease without esophagitis   (+) Hiatal hernia      Endocrine   (+) Class 2 severe obesity due to excess calories with serious comorbidity and body mass index (BMI) of 35.0 to 35.9 in adult   (+) Hypothyroidism   (+) Multinodular goiter      HEENT   (+) Seasonal allergies       Clinical information reviewed:   Tobacco  Allergies  Meds  Problems  Med Hx  Surg Hx   Fam Hx  Soc   Hx        NPO Detail:  NPO/Void Status  Date of Last Liquid: 07/08/25  Time of Last Liquid: 0400  Date of Last Solid: 07/07/25  Time of Last Solid: 1930  Last Intake Type: Clear fluids         Physical Exam    Airway  Mallampati: II  TM distance: >3 FB  Neck ROM: full     Cardiovascular - normal exam  Rhythm: regular  Rate: normal     Dental   Comments: Chipped upper front teeth     Pulmonary - normal exam   Abdominal            Anesthesia Plan    History of general anesthesia?: yes  History of complications of general anesthesia?: no    ASA 3     general     The patient is not a current smoker.  Education provided regarding risk of obstructive sleep apnea.  intravenous induction   Postoperative pain plan includes opioids.  Trial extubation is planned.  Anesthetic plan and risks discussed with patient.  Use of blood products discussed with patient who.    Plan discussed with CRNA, CAA and attending.      "

## 2025-07-08 NOTE — ANESTHESIA POSTPROCEDURE EVALUATION
"Patient: Dunia Moreau \"Erin\"    Procedure Summary       Date: 07/08/25 Room / Location: [facility] / [facility]    Anesthesia Start: 0735 Anesthesia Stop:     Procedure: ROBOTIC ASSISTED TRANSABDOMINAL HIATAL HERNIA REPAIR/FUNDOPLICATION Diagnosis:       Hiatal hernia      (Hiatal hernia [K44.9])    Surgeons: Jairon Chapman MD Responsible Provider: Jean Carlos Haile MD    Anesthesia Type: general ASA Status: 3            Anesthesia Type: general    Vitals Value Taken Time   /74 07/08/25 09:46   Temp 36.7 °C (98.1 °F) 07/08/25 09:43   Pulse 56 07/08/25 09:45   Resp 14 07/08/25 09:43   SpO2 100 % 07/08/25 09:45   Vitals shown include unfiled device data.    Anesthesia Post Evaluation    Patient location during evaluation: PACU  Patient participation: complete - patient participated  Level of consciousness: awake and alert  Pain management: adequate  Airway patency: patent  Cardiovascular status: acceptable  Respiratory status: acceptable  Hydration status: acceptable  Postoperative Nausea and Vomiting: none      There were no known notable events for this encounter.    "

## 2025-07-08 NOTE — H&P
"    [facility]   Thoracic Surgery   History of Presenting Illness       History Of Present Illness  Dunia Moreau \"Erin\" is a 75 y.o. female with a PMH significant for Hiatal Hernia with GERD, hypothyroidism, and obesity (BMI 35) who presents to [facility] on 7/8/2025 for a staged robotic hiatal hernia repair with thoracic surgeon, Dr. Jairon Chapman.     This patient presented to her gastroenterologist with worsening symptoms of reflux and dysphagia.  She notes dysphagia to solid foods such as bread rice and chicken.  She has been treating this with Pepcid without improvement.  Her gastroenterologist (Enrique) recommended an EGD, which was performed on 1/24/2024 and showed a small type II hiatal hernia with the GE junction at 38 cm from the incisors.  Biopsies of the GE junction showed no evidence of Johnston's esophagus.  Her gastro doctor also recommended twice daily PPI treatment, which has helped her symptoms, but they remain significant.      Subjective    Past Medical History  She has a past medical history of Anxiety, Arthritis, Asthma (1956), Biliary disease, Depression, Disease of thyroid gland, Elbow dislocation, left, subsequent encounter (10/11/2024), GERD (gastroesophageal reflux disease), Goiter (Check medical records. 30+ years), Hiatal hernia, Hypothyroid, Hypothyroidism (Check medical records. 30+ years), Left wrist fracture, closed, initial encounter (10/04/2024), Stiffness of left wrist joint (11/06/2024), and Vertigo.    Surgical History  She has a past surgical history that includes Other surgical history (12/09/2019); Other surgical history (12/09/2019); Other surgical history (12/09/2019); Other surgical history (12/09/2019); Other surgical history (12/09/2019); Other surgical history (12/09/2019); Other surgical history (09/30/2020); Cataract extraction (Bilateral, 05/28/2024); Colonoscopy; Upper gastrointestinal endoscopy; " Tonsillectomy; Hip Arthroplasty; and Closed reduction wrist fracture.     Social History  She reports that she has never smoked. She has never used smokeless tobacco. She reports current alcohol use. She reports that she does not use drugs.    Family History  Family History[1]     Allergies  Biotin, Penicillins, Adhesive tape-silicones, Amoxicillin, and Pravastatin    Review of Systems   Unable to perform ROS: Other (Emerging from OR anestesia)   Constitutional: Positive for malaise/fatigue.   Cardiovascular:  Negative for chest pain.   Respiratory:  Negative for shortness of breath.             Objective    Physical Exam  Vitals and nursing note reviewed.   Constitutional:       General: She is not in acute distress.     Appearance: Normal appearance. She is not ill-appearing.   HENT:      Head: Normocephalic and atraumatic.      Nose: Nose normal.      Mouth/Throat:      Mouth: Mucous membranes are moist.      Pharynx: Oropharynx is clear.   Eyes:      Extraocular Movements: Extraocular movements intact.      Conjunctiva/sclera: Conjunctivae normal.      Pupils: Pupils are equal, round, and reactive to light.   Cardiovascular:      Rate and Rhythm: Normal rate and regular rhythm.      Pulses: Normal pulses.      Heart sounds: Normal heart sounds, S1 normal and S2 normal. No murmur heard.     No systolic murmur is present.      No friction rub. No gallop.   Pulmonary:      Effort: Pulmonary effort is normal.      Breath sounds: Normal breath sounds.   Abdominal:      General: Abdomen is flat. Bowel sounds are normal. There is no distension.      Palpations: Abdomen is soft.   Musculoskeletal:         General: Normal range of motion.      Cervical back: Normal range of motion and neck supple.      Right lower leg: No edema.      Left lower leg: No edema.   Skin:     General: Skin is warm and dry.      Capillary Refill: Capillary refill takes less than 2 seconds.      Findings: No lesion.      Nails: There is no  clubbing.      Comments: Abdominal incisions well appro   Neurological:      General: No focal deficit present.      Mental Status: She is alert and oriented to person, place, and time. Mental status is at baseline.      Cranial Nerves: Cranial nerves 2-12 are intact.      Sensory: Sensation is intact.      Motor: Motor function is intact.   Psychiatric:         Attention and Perception: Attention and perception normal.         Mood and Affect: Mood normal.         Speech: Speech normal.         Behavior: Behavior normal.         Thought Content: Thought content normal.         Cognition and Memory: Cognition and memory normal.         Judgment: Judgment normal.         Vitals  Vitals:    07/08/25 1634   BP: 126/64   Pulse: 58   Resp: 16   Temp: 36.1 °C (97 °F)   SpO2:        Home Medications  Current Outpatient Medications   Medication Instructions    albuterol 90 mcg/actuation inhaler 2 puffs, inhalation, Every 4 hours PRN    b complex vitamins (Vitamins B Complex) capsule 1 capsule, Daily    CALCIUM CITRATE-VITAMIN D3 ORAL 1 tablet, Daily    co-enzyme Q-10 30 mg, Daily    fluticasone (Flonase) 50 mcg/actuation nasal spray 2 sprays, Each Nostril, Daily    levothyroxine (SYNTHROID, LEVOXYL) 50 mcg, oral, Daily    loratadine (Claritin) 10 mg tablet Take by mouth.    magnesium oxide-Mg AA chelate (Magnesium, oxide/AA chelate,) 300 mg capsule 1 capsule, Daily    multivitamin tablet 1 tablet, Daily    pantoprazole (PROTONIX) 40 mg, oral, 2 times daily, Do not crush, chew, or split.    PARoxetine (PAXIL) 30 mg, oral, Daily        Inpatient Medications  Scheduled medications  Scheduled Medications[2]  Continuous medications  Continuous Medications[3]  PRN medications  PRN Medications[4]     LDA:           Relevant Results  Vitals  Vitals:    07/08/25 1634   BP: 126/64   Pulse: 58   Resp: 16   Temp: 36.1 °C (97 °F)   SpO2:        Lab Review  Results from last 7 days   Lab Units 07/08/25  1443   WBC AUTO x10*3/uL 9.7    HEMOGLOBIN g/dL 13.1   HEMATOCRIT % 41.3   PLATELETS AUTO x10*3/uL 237     Results from last 7 days   Lab Units 07/08/25  1443   SODIUM mmol/L 137   POTASSIUM mmol/L 4.3   CHLORIDE mmol/L 101   CO2 mmol/L 29   BUN mg/dL 21   CREATININE mg/dL 0.93   CALCIUM mg/dL 8.7   GLUCOSE mg/dL 139*     Results from last 7 days   Lab Units 07/08/25  1443   MAGNESIUM mg/dL 1.76           I/O:  No intake/output data recorded.             Daily Progress Note    7/8/2025: Seen and assessed patient in the immediate post-op period; s/p robotic hiatal hernia repair.     Patient was noted to be with some subcutaneous air bilateral in her upper shoulders & neck, slightly extending into the face. A CXR was obtained and though subcutaneous air was noted, her lung fields were without concerning findings.     Will continue to follow CXR and observe patient in the hospital setting.        Assessment & Plan       Hiatal Hernia  - S/p robotic hiatal hernia repair on 7/8/25 with Thoracic Surgeon, Dr. Jairon Chapman   - NPO in the immediate post-op period  ---> Will trial clear liquids & PO medications in the morning  - Continue on PPI        Acute Respiratory Insufficiency   - As anticipated in the immediate post operative period   - Current O2 Requirements: 1L NC  - IS/Deep Breathing Exercises       Acute Post-Op Pain  - As expected   - PCA until able to take PO medications   - Bowel regimen while taking narcotics        Hypothyroidism   - Home Medication: Levothyroxine 50mg every day  - Replace with IV while NPO     Prophylaxis  - GI: PPI  - DVT: Juve-Hose & starting enoxaparin on POD #1  - MRSA: Negative (6/26)  - PT/OT     Disposition  - Regular nursing floor     Above patient and plan discussed with thoracic surgeon, Dr. Diana Chapman.     Kirby Leone, APRN-CNP, DNP           [1]   Family History  Problem Relation Name Age of Onset    Atrial fibrillation Mother      Dementia Mother      Diabetes Mother      Transient ischemic  attack Mother      Uterine cancer Mother      Hypertension Mother      Thyroid disease Sister      Uterine cancer Sister      Diabetes Maternal Grandmother      Hypertension Maternal Grandmother      Prostate cancer Paternal Grandfather      Lung cancer Paternal Grandfather      Hypertension Sister Uzma Lind     Heart attack Sister Uzma Lind    [2] enoxaparin, 40 mg, subcutaneous, q24h  fluticasone, 2 spray, Each Nostril, Daily  [START ON 7/9/2025] levothyroxine, 25 mcg, intravenous, q24h ARI  oxygen, , inhalation, Continuous - Inhalation  pantoprazole, 40 mg, intravenous, Daily     [3] HYDROmorphone,      [4] PRN medications: albuterol, metoclopramide, naloxone, ondansetron ODT **OR** ondansetron

## 2025-07-09 ENCOUNTER — APPOINTMENT (OUTPATIENT)
Dept: RADIOLOGY | Facility: HOSPITAL | Age: 75
DRG: 327 | End: 2025-07-09
Payer: COMMERCIAL

## 2025-07-09 LAB
ANION GAP SERPL CALC-SCNC: 12 MMOL/L (ref 10–20)
BUN SERPL-MCNC: 18 MG/DL (ref 6–23)
CALCIUM SERPL-MCNC: 8.9 MG/DL (ref 8.6–10.3)
CHLORIDE SERPL-SCNC: 101 MMOL/L (ref 98–107)
CO2 SERPL-SCNC: 31 MMOL/L (ref 21–32)
CREAT SERPL-MCNC: 0.87 MG/DL (ref 0.5–1.05)
EGFRCR SERPLBLD CKD-EPI 2021: 69 ML/MIN/1.73M*2
ERYTHROCYTE [DISTWIDTH] IN BLOOD BY AUTOMATED COUNT: 14.3 % (ref 11.5–14.5)
GLUCOSE SERPL-MCNC: 113 MG/DL (ref 74–99)
HCT VFR BLD AUTO: 38.2 % (ref 36–46)
HGB BLD-MCNC: 11.7 G/DL (ref 12–16)
MAGNESIUM SERPL-MCNC: 1.86 MG/DL (ref 1.6–2.4)
MCH RBC QN AUTO: 29.2 PG (ref 26–34)
MCHC RBC AUTO-ENTMCNC: 30.6 G/DL (ref 32–36)
MCV RBC AUTO: 95 FL (ref 80–100)
NRBC BLD-RTO: 0 /100 WBCS (ref 0–0)
PLATELET # BLD AUTO: 239 X10*3/UL (ref 150–450)
POTASSIUM SERPL-SCNC: 4.9 MMOL/L (ref 3.5–5.3)
RBC # BLD AUTO: 4.01 X10*6/UL (ref 4–5.2)
SODIUM SERPL-SCNC: 139 MMOL/L (ref 136–145)
WBC # BLD AUTO: 9.7 X10*3/UL (ref 4.4–11.3)

## 2025-07-09 PROCEDURE — 2500000004 HC RX 250 GENERAL PHARMACY W/ HCPCS (ALT 636 FOR OP/ED): Performed by: NURSE PRACTITIONER

## 2025-07-09 PROCEDURE — 99222 1ST HOSP IP/OBS MODERATE 55: CPT

## 2025-07-09 PROCEDURE — 0CJS8ZZ INSPECTION OF LARYNX, VIA NATURAL OR ARTIFICIAL OPENING ENDOSCOPIC: ICD-10-PCS | Performed by: STUDENT IN AN ORGANIZED HEALTH CARE EDUCATION/TRAINING PROGRAM

## 2025-07-09 PROCEDURE — 80048 BASIC METABOLIC PNL TOTAL CA: CPT | Performed by: NURSE PRACTITIONER

## 2025-07-09 PROCEDURE — 31575 DIAGNOSTIC LARYNGOSCOPY: CPT | Performed by: STUDENT IN AN ORGANIZED HEALTH CARE EDUCATION/TRAINING PROGRAM

## 2025-07-09 PROCEDURE — 71045 X-RAY EXAM CHEST 1 VIEW: CPT

## 2025-07-09 PROCEDURE — 96372 THER/PROPH/DIAG INJ SC/IM: CPT | Performed by: NURSE PRACTITIONER

## 2025-07-09 PROCEDURE — 7100000011 HC EXTENDED STAY RECOVERY HOURLY - NURSING UNIT

## 2025-07-09 PROCEDURE — 99233 SBSQ HOSP IP/OBS HIGH 50: CPT | Performed by: NURSE PRACTITIONER

## 2025-07-09 PROCEDURE — 36415 COLL VENOUS BLD VENIPUNCTURE: CPT | Performed by: NURSE PRACTITIONER

## 2025-07-09 PROCEDURE — 83735 ASSAY OF MAGNESIUM: CPT | Performed by: NURSE PRACTITIONER

## 2025-07-09 PROCEDURE — 97165 OT EVAL LOW COMPLEX 30 MIN: CPT | Mod: GO

## 2025-07-09 PROCEDURE — 85027 COMPLETE CBC AUTOMATED: CPT | Performed by: NURSE PRACTITIONER

## 2025-07-09 PROCEDURE — 71045 X-RAY EXAM CHEST 1 VIEW: CPT | Performed by: STUDENT IN AN ORGANIZED HEALTH CARE EDUCATION/TRAINING PROGRAM

## 2025-07-09 PROCEDURE — 97161 PT EVAL LOW COMPLEX 20 MIN: CPT | Mod: GP

## 2025-07-09 PROCEDURE — 2500000005 HC RX 250 GENERAL PHARMACY W/O HCPCS: Performed by: NURSE PRACTITIONER

## 2025-07-09 RX ORDER — ACETAMINOPHEN 10 MG/ML
1000 INJECTION, SOLUTION INTRAVENOUS EVERY 6 HOURS
Status: DISCONTINUED | OUTPATIENT
Start: 2025-07-09 | End: 2025-07-10

## 2025-07-09 RX ADMIN — Medication 2 L/MIN: at 20:37

## 2025-07-09 RX ADMIN — ACETAMINOPHEN 1000 MG: 10 INJECTION INTRAVENOUS at 20:16

## 2025-07-09 RX ADMIN — PANTOPRAZOLE SODIUM 40 MG: 40 INJECTION, POWDER, FOR SOLUTION INTRAVENOUS at 08:29

## 2025-07-09 RX ADMIN — LEVOTHYROXINE SODIUM ANHYDROUS 25 MCG: 100 INJECTION, POWDER, LYOPHILIZED, FOR SOLUTION INTRAVENOUS at 08:30

## 2025-07-09 RX ADMIN — Medication 1 L/MIN: at 08:39

## 2025-07-09 RX ADMIN — ENOXAPARIN SODIUM 40 MG: 40 INJECTION SUBCUTANEOUS at 16:45

## 2025-07-09 ASSESSMENT — COGNITIVE AND FUNCTIONAL STATUS - GENERAL
DRESSING REGULAR UPPER BODY CLOTHING: A LITTLE
MOVING TO AND FROM BED TO CHAIR: A LITTLE
DAILY ACTIVITIY SCORE: 19
MOBILITY SCORE: 18
TURNING FROM BACK TO SIDE WHILE IN FLAT BAD: A LITTLE
HELP NEEDED FOR BATHING: A LITTLE
WALKING IN HOSPITAL ROOM: A LITTLE
PERSONAL GROOMING: A LITTLE
TOILETING: A LITTLE
DRESSING REGULAR LOWER BODY CLOTHING: A LITTLE
STANDING UP FROM CHAIR USING ARMS: A LITTLE
MOBILITY SCORE: 24
DAILY ACTIVITIY SCORE: 24
CLIMB 3 TO 5 STEPS WITH RAILING: A LOT

## 2025-07-09 ASSESSMENT — ENCOUNTER SYMPTOMS
BLURRED VISION: 0
LIGHT-HEADEDNESS: 0
ORTHOPNEA: 0
ALTERED MENTAL STATUS: 0
BLOATING: 0
SHORTNESS OF BREATH: 0
STIFFNESS: 0
IRREGULAR HEARTBEAT: 0
NAIL CHANGES: 0
POOR WOUND HEALING: 0
MYALGIAS: 0
FREQUENCY: 0
HOARSE VOICE: 0
COUGH: 0
MUSCLE CRAMPS: 0
WEAKNESS: 0
NAUSEA: 0
DOUBLE VISION: 0
DECREASED APPETITE: 0
FOCAL WEAKNESS: 0
STRIDOR: 0
VOMITING: 0
DYSPNEA ON EXERTION: 0
PALPITATIONS: 0
DIZZINESS: 0
SORE THROAT: 0

## 2025-07-09 ASSESSMENT — PAIN SCALES - GENERAL
PAINLEVEL_OUTOF10: 3
PAINLEVEL_OUTOF10: 4
PAINLEVEL_OUTOF10: 7
PAINLEVEL_OUTOF10: 2
PAINLEVEL_OUTOF10: 3
PAINLEVEL_OUTOF10: 0 - NO PAIN
PAINLEVEL_OUTOF10: 2
PAINLEVEL_OUTOF10: 3
PAINLEVEL_OUTOF10: 2
PAINLEVEL_OUTOF10: 4
PAINLEVEL_OUTOF10: 3

## 2025-07-09 ASSESSMENT — PAIN DESCRIPTION - DESCRIPTORS
DESCRIPTORS: ACHING

## 2025-07-09 NOTE — PROGRESS NOTES
"Occupational Therapy    Evaluation    Patient Name: Dunia Moreau \"Erin\"  MRN: [id number]  Department: [facility]  Room: [address] [place], [place] [address]  Today's Date: 7/9/2025  Time Calculation  Start Time: 0834  Stop Time: 0859  Time Calculation (min): 25 min        Assessment:  End of Session Communication: Bedside nurse  End of Session Patient Position: Up in chair, Alarm on (call light in reach, all needs met)  OT Assessment Results: Decreased ADL status, Decreased endurance, Decreased functional mobility  Strengths: Living arrangement secure, Physical health, Premorbid level of function, Support of extended family/friends  Barriers to Participation: Comorbidities  Plan:  Treatment Interventions: ADL retraining, Functional transfer training, UE strengthening/ROM, Endurance training, Equipment evaluation/education, Fine motor coordination activities, Compensatory technique education  OT Frequency: 3 times per week  OT Discharge Recommendations: Low intensity level of continued care  OT - OK to Discharge: Yes (once medically appropriate to next level of care)  Treatment Interventions: ADL retraining, Functional transfer training, UE strengthening/ROM, Endurance training, Equipment evaluation/education, Fine motor coordination activities, Compensatory technique education    Subjective   Current Problem:  1. Hiatal hernia          OT Visit Info:  OT Received On: 07/09/25  General:  General  Reason for Referral: OT eval and tx. 7/8 Hiatal hernia.  Referred By: Adela  Past Medical History Relevant to Rehab: 75 y.o. female with a PMH significant for Hiatal Hernia with GERD, hypothyroidism, and obesity (BMI 35) who presents to [facility] on 7/8/2025 for a staged robotic hiatal hernia repair with thoracic surgeon, Dr. Jairon Chapman.  Co-Treatment: PT  Co-Treatment Reason: for safety and to maximize therapeutic performance  Prior to Session Communication: Bedside nurse  Patient Position Received: Bed, 2 rail " up, Alarm on  General Comment: patient pleasant and cooperative to participate; however with facial swelling s/p surgery; unable to fully open eyes due to this  Precautions:  Medical Precautions: Fall precautions (alarms, ambulate 3x/day, NPO, IV, 02 via NC)            Pain:  Pain Assessment  Pain Assessment:  (denies pain, reports discomfort from facial swelling)    Objective   Cognition:  Overall Cognitive Status: Within Functional Limits           Home Living:  Type of Home:  (patient reports daughter lives with her; 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 4 [address] house with B HR. [employer] style home with basement)  Bathroom Shower/Tub:  (tub  shower, owns shower chair with [facility])  Bathroom Toilet:  (raised toilet uses counter to steady)  Home Living Comments: reports daughter in law is retired and can assist PRN  Prior Function:  Level of [place]:  (independent in ADLs/IADLs PLOF. drives. works 3 days/week at [employer]. no AD use PLAvaz. owns WW and cane)    ADL:  ADL Comments: antricipate CGA-MIN A at this time due to facial swelling and inability to fully see due to this and also with performance overall this date  Activity Tolerance:  Endurance: Endurance does not limit participation in activity  Bed Mobility/Transfers: Bed Mobility  Bed Mobility:  (completed supine to sit at MOD I with use of bed rail and HOB elevated)    Transfers  Transfer:  (completed STS with CGA)      Functional Mobility:  Functional Mobility  Functional Mobility Performed:  (engaged in simple mobility with MIN A with WW)     Sensation:  Light Touch: No apparent deficits  Strength:  Strength Comments: BUE ROM/MMT WFL for patient age as seen through transfers/mobility this date      Outcome Measures:[facility] Daily Activity  Putting on and taking off regular lower body clothing: A little  Bathing (including washing, rinsing, drying): A little  Putting on and taking off regular upper body clothing: A little  Toileting, which includes using toilet,  bedpan or urinal: A little  Taking care of personal grooming such as brushing teeth: A little  Eating Meals: None  Daily Activity - Total Score: 19        Education Documentation  Body Mechanics, taught by Devi Aquino OT at 7/9/2025 11:13 AM.  Learner: Patient  Readiness: Acceptance  Method: Explanation  Response: Verbalizes Understanding, Needs Reinforcement    ADL Training, taught by Devi Aquino OT at 7/9/2025 11:13 AM.  Learner: Patient  Readiness: Acceptance  Method: Explanation  Response: Verbalizes Understanding, Needs Reinforcement    Education Comments  No comments found.        OP EDUCATION:       Goals:  Encounter Problems       Encounter Problems (Active)       OT Goals       STG- patient will complete LB dressing at SBA with use of ae/ad/dme prn (Progressing)       Start:  07/09/25    Expected End:  07/23/25            STG- patient will complete toileting with SBA with use of ae/ad/dme prn  (Progressing)       Start:  07/09/25    Expected End:  07/23/25            STG- patient will complete transfers to/from bed, chair, commode at SBA with use of ae/ad/dme prn (Progressing)       Start:  07/09/25    Expected End:  07/23/25            STG- patient will complete simple mobility at SBA with use of ae/ad/dme prn  (Progressing)       Start:  07/09/25    Expected End:  07/23/25            STG- patient will complete grooming at SBA with use of ae/ad/dme prn  (Progressing)       Start:  07/09/25    Expected End:  07/23/25

## 2025-07-09 NOTE — PROGRESS NOTES
"[facility]   Cardiothoracic Surgery   Progress Note        Dunia Moreau \"Erin\" is a 75 y.o. female on day 0 of admission presenting with Hiatal hernia.    Subjective     Interval HPI: Seen and assessed patient this morning while they were laying in bed; endorsing worsening swelling in her face, however no difficulty swallowing.     Review of Systems   Constitutional: Positive for malaise/fatigue. Negative for decreased appetite.   HENT:  Negative for congestion, hearing loss, hoarse voice, sore throat, stridor and tinnitus.    Eyes:  Negative for blurred vision and double vision.   Cardiovascular:  Negative for chest pain, dyspnea on exertion, irregular heartbeat, leg swelling, orthopnea and palpitations.   Respiratory:  Negative for cough and shortness of breath.    Endocrine: Negative for cold intolerance and heat intolerance.   Skin:  Negative for nail changes, poor wound healing and rash.   Musculoskeletal:  Negative for arthritis, muscle cramps, muscle weakness, myalgias and stiffness.   Gastrointestinal:  Negative for bloating, nausea and vomiting.   Genitourinary:  Negative for frequency, hesitancy and urgency.   Neurological:  Negative for dizziness, focal weakness, light-headedness and weakness.   Psychiatric/Behavioral:  Negative for altered mental status.    Allergic/Immunologic: Negative for environmental allergies.         Objective   Physical Exam  Physical Exam  Vitals and nursing note reviewed.   Constitutional:       General: She is awake. She is not in acute distress.     Appearance: Normal appearance. She is not ill-appearing.   HENT:      Head: Normocephalic and atraumatic.      Nose: Nose normal.      Mouth/Throat:      Mouth: Mucous membranes are moist.      Pharynx: Oropharynx is clear.   Eyes:      Extraocular Movements: Extraocular movements intact.      Conjunctiva/sclera: Conjunctivae normal.      Pupils: Pupils are equal, round, and reactive to light. "      Comments: Face with diffuse subcutaneous emphysemea    Cardiovascular:      Rate and Rhythm: Normal rate and regular rhythm.      Pulses: Normal pulses.      Heart sounds: Normal heart sounds, S1 normal and S2 normal. No murmur heard.     No systolic murmur is present.      No friction rub. No gallop.   Pulmonary:      Effort: Pulmonary effort is normal.      Breath sounds: Normal breath sounds.   Chest:      Chest wall: Crepitus present.   Abdominal:      General: Abdomen is flat. Bowel sounds are normal. There is no distension.      Palpations: Abdomen is soft.   Musculoskeletal:         General: Normal range of motion.      Cervical back: Normal range of motion and neck supple. Crepitus present.      Right lower leg: No edema.      Left lower leg: No edema.   Skin:     General: Skin is warm and dry.      Capillary Refill: Capillary refill takes less than 2 seconds.      Findings: No lesion.      Nails: There is no clubbing.      Comments: Abdominal incisions C/D/I   Neurological:      General: No focal deficit present.      Mental Status: She is alert and oriented to person, place, and time. Mental status is at baseline.      Cranial Nerves: Cranial nerves 2-12 are intact.      Sensory: Sensation is intact.      Motor: Motor function is intact.   Psychiatric:         Attention and Perception: Attention and perception normal.         Mood and Affect: Mood normal.         Speech: Speech normal.         Behavior: Behavior normal.         Thought Content: Thought content normal.         Cognition and Memory: Cognition and memory normal.         Judgment: Judgment normal.         Last Recorded Vitals  Vitals:    07/08/25 1933 07/08/25 2359 07/09/25 0349 07/09/25 0751   BP: 131/66 123/60 135/63 133/72   BP Location: Left arm Left arm  Left arm   Patient Position: Lying Lying  Lying   Pulse: 59 64 67 65   Resp: 18 18 18 18   Temp: 35.8 °C (96.4 °F) 36.1 °C (97 °F) 37 °C (98.6 °F) 36.7 °C (98.1 °F)   TempSrc:  "Temporal Temporal  Temporal   SpO2: 96% 97% 97% 96%   Weight:       Height:            Intake/Output last 3 Shifts:  I/O last 3 completed shifts:  In: 2133.3 (22.5 mL/kg) [I.V.:933.3 (9.8 mL/kg); IV Piggyback:1200]  Out: 800 (8.4 mL/kg) [Urine:800 (0.2 mL/kg/hr)]  Weight: 94.8 kg     Inpatient Medications  Scheduled Medications[1]  Continuous medications  Continuous Medications[2]  PRN medications  PRN Medications[3]     LDA:       Relevant Results  Lab Review  Results from last 7 days   Lab Units 07/09/25  0639   WBC AUTO x10*3/uL 9.7   HEMOGLOBIN g/dL 11.7*   HEMATOCRIT % 38.2   PLATELETS AUTO x10*3/uL 239     Results from last 7 days   Lab Units 07/09/25  0639   SODIUM mmol/L 139   POTASSIUM mmol/L 4.9   CHLORIDE mmol/L 101   CO2 mmol/L 31   BUN mg/dL 18   CREATININE mg/dL 0.87   CALCIUM mg/dL 8.9   GLUCOSE mg/dL 113*     Results from last 7 days   Lab Units 07/09/25  0639   MAGNESIUM mg/dL 1.86             Radiographic Testing    Chest Imaging:  XR chest 1 view   Final Result   1. Extensive/worsening neck and chest wall subcutaneous emphysema   with a possible pneumomediastinum.        Signed by: Ryan Jamison 7/9/2025 7:08 AM   Dictation workstation:   [id number]      XR chest 1 view   Final Result   No focal infiltrate or pneumothorax is identified.        MACRO:   None.        Signed by: Chalo Razo 7/8/2025 2:08 PM   Dictation workstation:   [id number]                History of Present Illness: Dunia Moreau \"Erin\" is a 75 y.o. female with a PMH significant for Hiatal Hernia with GERD, hypothyroidism, and obesity (BMI 35) who presents to [facility] on 7/8/2025 for a staged robotic hiatal hernia repair with thoracic surgeon, Dr. Jairon Chapman.     This patient presented to her gastroenterologist with worsening symptoms of reflux and dysphagia.  She notes dysphagia to solid foods such as bread rice and chicken.  She has been treating this with Pepcid without " improvement.  Her gastroenterologist (Enrique) recommended an EGD, which was performed on 1/24/2024 and showed a small type II hiatal hernia with the GE junction at 38 cm from the incisors.  Biopsies of the GE junction showed no evidence of Johnston's esophagus.  Her gastro doctor also recommended twice daily PPI treatment, which has helped her symptoms, but they remain significant.     Daily Events    7/9/25: This morning, the patient was noted to be with slightly worse subcutaneous emphysema with facial swelling slightly worse. However, she's without respiratory concerns; remains on RA and is hemodynamically stable.     However, given her subcutaneous air, ENT is consulted for a more urgent upper airway endoscopy to rule out an unlikely tracheal injury.     Plan to keep the patient on a strict NPO. Will move to ICU for closer observation and given this environment will be better equip for invasive bedside procedures.     - Current O2 Requirements: RA    Assessment & Plan       Hiatal Hernia  - S/p robotic hiatal hernia repair on 7/8/25 with Thoracic Surgeon, Dr. Jairon Chapman   - NPO in the immediate post-op period  ---> Continue strict NPO until upper airway endoscopy & esophagram rule out injury   - Continue on PPI         Acute Respiratory Insufficiency   - As anticipated in the immediate post operative period   - Current O2 Requirements: RA  - IS/Deep Breathing Exercises        Acute Post-Op Pain  - As expected   - PCA until able to take PO medications   - Bowel regimen while taking narcotics         Hypothyroidism   - Home Medication: Levothyroxine 50mg every day  - Replace with IV while NPO      Prophylaxis  - GI: PPI  - DVT: Juve-Hose & starting enoxaparin on POD #1  - MRSA: Negative (6/26)  - PT/OT      Disposition  - CVICU      Above patient and plan discussed with thoracic surgeon, Dr. Diana Chapman.     Kirby Leone, APRN-CNP, DNP           [1] enoxaparin, 40 mg, subcutaneous, q24h  fluticasone, 2  spray, Each Nostril, Daily  levothyroxine, 25 mcg, intravenous, q24h ARI  oxygen, , inhalation, Continuous - Inhalation  pantoprazole, 40 mg, intravenous, Daily  [2] HYDROmorphone,   [3] PRN medications: albuterol, metoclopramide, naloxone, ondansetron ODT **OR** ondansetron

## 2025-07-09 NOTE — CARE PLAN
The patient's goals for the shift include      The clinical goals for the shift include   Problem: Pain - Adult  Goal: Verbalizes/displays adequate comfort level or baseline comfort level  Outcome: Progressing     Problem: Safety - Adult  Goal: Free from fall injury  Outcome: Progressing     Problem: Discharge Planning  Goal: Discharge to home or other facility with appropriate resources  Outcome: Progressing     Problem: Chronic Conditions and Co-morbidities  Goal: Patient's chronic conditions and co-morbidity symptoms are monitored and maintained or improved  Outcome: Progressing     Problem: Nutrition  Goal: Nutrient intake appropriate for maintaining nutritional needs  Outcome: Progressing     Problem: Skin  Goal: Decreased wound size/increased tissue granulation at next dressing change  Outcome: Progressing  Flowsheets (Taken 7/9/2025 0734)  Decreased wound size/increased tissue granulation at next dressing change: Promote sleep for wound healing  Goal: Promote/optimize nutrition  Outcome: Progressing  Flowsheets (Taken 7/9/2025 0734)  Promote/optimize nutrition: Monitor/record intake including meals

## 2025-07-09 NOTE — PROGRESS NOTES
CTSP regarding Sub Q emphysema post Robotic Assisted Transabdomina Hiatal Hernia surgery.  POD#1    Patient had General Anesthesia with Ett.  Induction and intubation were unremarkable.  Patient was easily mask ventilated and Intubation was easy with the elective use of a Alvarez Video laryngoscopy #4 blade.  Ett #7.0 with blunt stylette (stylette was positioned such that it was not positioned past the aminta eye) was advanced so that the cuff  was positioned just past the Vocal cords.  This was done without difficulty.  There was no resistance, and only 1 attempt was necessary.  After induction there was no evidence of  injury to the oropharynx.  No heme was noted.  Immediately after induction/intubation there were no signs of airway compromise.  There was no issue/problem with ventilation.  The ventilator settings were set within a normal limit.  Upon emergence patient's mouth and oropharynx was suctioned.  No heme was noted. There was no immediate evidence of SubQ Emphysema or facial swelllinlg.   In PACU the RN noted some crepitus in the shoulder and neck area, and patient did have pain in the shoulders/chest. Patient was discharged to the [facility] in stable condition.

## 2025-07-09 NOTE — PROGRESS NOTES
07/09/25 1318   Discharge Planning   Living Arrangements Children  (adult daughter)   Support Systems Children;Family members;Friends/neighbors   Assistance Needed independent and driving PTA   Type of Residence Private residence   Number of Stairs to Enter Residence 4   Number of Stairs Within Residence 0   Do you have animals or pets at home? Yes   Type of Animals or Pets 1 dog, 2 cats   Home or Post Acute Services In home services   Type of Home Care Services Home OT;Home PT   Expected Discharge Disposition Home H   Does the patient need discharge transport arranged? No   Patient Choice   Provider Choice list and CMS website (https://medicare.gov/care-compare#search) for post-acute Quality and Resource Measure Data were provided and reviewed with: Patient   Patient / Family choosing to utilize agency / facility established prior to hospitalization No     Record reviewed.  POD #1 Robotic repair hiatal hernia, partial fundoplication.  Developed subcutaneous emphysema and transferred to CICU from 2nd floor.  This TCC met with patient and family at bedside, introduced self and explained role.  Demographic information and insurance verified.  Patient is from independent from home.  Denies SW needs at this time.  Patient plans to return home at discharge.  Therapy recommending low intensity therapy at discharge.  Agreeable to [facility].  Freedom of choice explained.  Preference is [facility].  Care team updated.  Daughter works for [employer] at [employer].  Family is providing transportation home.  Care Transitions will continue to follow.

## 2025-07-09 NOTE — PROGRESS NOTES
"Dunia Moreau \"Erin\" is a 75 y.o. female on day 1 of admission presenting with Hiatal hernia.    Subjective   Overnight the patient continued to have significant subcutaneous emphysema.  Seeing her this morning, she does have subcutaneous emphysema extending to her eyelids making it difficult for her to see.  She denies any other complaints of any type including difficulty breathing pain in her chest and vital signs have been normal without evidence of tachycardia.         Objective     Physical Exam  Constitutional:       Appearance: She is normal weight.      Comments: Significant subcutaneous emphysema in her eyelids   HENT:      Head: Normocephalic.      Nose: Nose normal.      Mouth/Throat:      Mouth: Mucous membranes are moist.      Pharynx: Oropharynx is clear.   Eyes:      Extraocular Movements: Extraocular movements intact.      Conjunctiva/sclera: Conjunctivae normal.   Cardiovascular:      Rate and Rhythm: Normal rate and regular rhythm.   Pulmonary:      Effort: Pulmonary effort is normal.      Breath sounds: Normal breath sounds.   Abdominal:      General: Bowel sounds are normal. There is no distension.      Palpations: Abdomen is soft.      Tenderness: There is no abdominal tenderness. There is no guarding.   Skin:     General: Skin is warm and dry.      Comments: Subcutaneous emphysema over the upper chest   Neurological:      Mental Status: She is alert and oriented to person, place, and time.   Psychiatric:         Mood and Affect: Mood normal.         Behavior: Behavior normal.         Last Recorded Vitals  Blood pressure 135/63, pulse 67, temperature 37 °C (98.6 °F), resp. rate 18, height 1.626 m (5' 4.02\"), weight 94.8 kg (208 lb 15.9 oz), SpO2 97%.  Intake/Output last 3 Shifts:  I/O last 3 completed shifts:  In: 2133.3 (22.5 mL/kg) [I.V.:933.3 (9.8 mL/kg); IV Piggyback:1200]  Out: 800 (8.4 mL/kg) [Urine:800 (0.2 mL/kg/hr)]  Weight: 94.8 kg     Relevant Results  === 07/08/25 ===    XR " CHEST 1 VIEW    - Impression -  1. Extensive/worsening neck and chest wall subcutaneous emphysema  with a possible pneumomediastinum.    Signed by: Ryan Jamison 7/9/2025 7:08 AM  Dictation workstation:   [id number]                     Assessment & Plan  Hiatal hernia    Although I believe the most likely source of this patient's subcutaneous emphysema is from abdominal insufflation/pneumoperitoneum, the persistent nature of it raises the possibility she could have a tracheal injury during intubation.  I think this is more likely than a leak from her esophagus.  To evaluate her pharynx and trachea, I recommend fiberoptic laryngoscopy and we will contact Dr. Gardner to facilitate this.  If she is continuing to have subcutaneous emphysema, we will also obtain an esophagram later today.  Given her normal vital signs and clinical picture, I do not believe an ICU transfer is necessary at this time.  I will continue to evaluate her closely      Jairon Chapman MD

## 2025-07-09 NOTE — CARE PLAN
Problem: Pain - Adult  Goal: Verbalizes/displays adequate comfort level or baseline comfort level  Outcome: Progressing     Problem: Safety - Adult  Goal: Free from fall injury      The clinical goals for the shift include pain control, safety

## 2025-07-09 NOTE — PROCEDURES
HPI: 75 y.o. female seen at request of thoracic surgery following robotic hiatal hernia repair with significant subcutaneous emphysema.  Request for evaluation of the airway subglottis to evaluate for potential laryngeal/tracheal injury.    --------------------------------------------------  Procedure: Flexible Laryngoscopy - Diagnostic  Indication: Subcutaneous emphysema  Informed consent verbally obtained: The risks, benefits, alternatives, and expectations were discussed with the patient, who wished to proceed  Anesthesia: Topical lidocaine/oxymetazoline    Findings: After topical anesthetic was applied the flexible endoscope was advanced into the naris. The nasal cavity, nasopharynx, oropharynx, hypopharynx, and larynx were evaluated and were normal with the following significant findings or exceptions:    - Right NSD  - No masses/lesions appreciated  - Normal and symmetric TVC motion bilaterally  - Good closure without glottal gap  - Mild to moderate glottic edema  - There is mild resolving ecchymosis of the left posterior glottis not inconsistent with recent intubation  - Clear subglottis but small area of ecchymosis vs hematoma along the left lateral subglottic wall  - The remaining superior trachea appears healthy without obvious injury noted  - No pooling of secretions    Visualization somewhat limited by patient discomfort and splinting during exam.    There were no complications and the patient tolerated the procedure well.  --------------------------------------------------    A/P:  Discussed care with Dr. Chapman. Recommendation for esophagram as next step in work-up.

## 2025-07-09 NOTE — CARE PLAN
The clinical goals for the shift include Pt will remain hemodynamically stable      Problem: Pain - Adult  Goal: Verbalizes/displays adequate comfort level or baseline comfort level  Outcome: Progressing  Flowsheets (Taken 7/9/2025 1935)  Verbalizes/displays adequate comfort level or baseline comfort level:   Encourage patient to monitor pain and request assistance   Assess pain using appropriate pain scale   Administer analgesics based on type and severity of pain and evaluate response   Implement non-pharmacological measures as appropriate and evaluate response   Consider cultural and social influences on pain and pain management   Notify Licensed Independent Practitioner if interventions unsuccessful or patient reports new pain     Problem: Safety - Adult  Goal: Free from fall injury  Outcome: Progressing  Flowsheets (Taken 7/9/2025 1935)  Free from fall injury:   Instruct family/caregiver on patient safety   Based on caregiver fall risk screen, instruct family/caregiver to ask for assistance with transferring infant if caregiver noted to have fall risk factors     Problem: Discharge Planning  Goal: Discharge to home or other facility with appropriate resources  Outcome: Progressing  Flowsheets (Taken 7/9/2025 1935)  Discharge to home or other facility with appropriate resources:   Identify barriers to discharge with patient and caregiver   Arrange for needed discharge resources and transportation as appropriate   Identify discharge learning needs (meds, wound care, etc)   Arrange for interpreters to assist at discharge as needed   Refer to discharge planning if patient needs post-hospital services based on physician order or complex needs related to functional status, cognitive ability or social support system     Problem: Chronic Conditions and Co-morbidities  Goal: Patient's chronic conditions and co-morbidity symptoms are monitored and maintained or improved  Outcome: Progressing  Flowsheets (Taken  7/9/2025 1935)  Care Plan - Patient's Chronic Conditions and Co-Morbidity Symptoms are Monitored and Maintained or Improved:   Monitor and assess patient's chronic conditions and comorbid symptoms for stability, deterioration, or improvement   Collaborate with multidisciplinary team to address chronic and comorbid conditions and prevent exacerbation or deterioration   Update acute care plan with appropriate goals if chronic or comorbid symptoms are exacerbated and prevent overall improvement and discharge     Problem: Nutrition  Goal: Nutrient intake appropriate for maintaining nutritional needs  Outcome: Progressing     Problem: Skin  Goal: Decreased wound size/increased tissue granulation at next dressing change  Outcome: Progressing  Flowsheets (Taken 7/9/2025 1935)  Decreased wound size/increased tissue granulation at next dressing change:   Promote sleep for wound healing   Protective dressings over bony prominences   Utilize specialty bed per algorithm  Goal: Promote/optimize nutrition  Outcome: Progressing  Flowsheets (Taken 7/9/2025 1935)  Promote/optimize nutrition:   Assist with feeding   Monitor/record intake including meals   Consume > 50% meals/supplements   Offer water/supplements/favorite foods   Discuss with provider if NPO > 2 days   Reassess MST if dietician not consulted

## 2025-07-09 NOTE — PROGRESS NOTES
"Physical Therapy    Physical Therapy Evaluation    Patient Name: Dunia Moreau \"Erin\"  MRN: [id number]  Today's Date: 7/9/2025   Time Calculation  Start Time: 0834  Stop Time: 0859  Time Calculation (min): 25 min  171/171-A    Assessment/Plan   PT Assessment  PT Assessment Results: Decreased strength, Decreased endurance, Impaired balance, Decreased mobility, Decreased safety awareness, Pain, Impaired vision  Rehab Prognosis: Good  Barriers to Discharge Home: No anticipated barriers  Evaluation/Treatment Tolerance: Patient limited by fatigue, Patient limited by pain  End of Session Communication: Bedside nurse  Assessment Comment: Continued skilled PT intervention indicated to facilitate increased strength, balance & gait stability  End of Session Patient Position: Up in chair, Alarm on (call light in reach, all needs met)  IP OR SWING BED PT PLAN  Inpatient or Swing Bed: Inpatient  PT Plan  Treatment/Interventions: Bed mobility, Transfer training, Gait training, Stair training, Balance training, Therapeutic exercise, Therapeutic activity  PT Plan: Ongoing PT  PT Frequency: 3 times per week  PT Discharge Recommendations: Low intensity level of continued care (w/ initial 24hr support for safe transition home postop)  PT Recommended Transfer Status: Assist x1  PT - OK to Discharge: Yes (when cleared by medical team)    Subjective     Current Problem:  1. Hiatal hernia          Problem List[1]    General Visit Information:  General  Reason for Referral: PT eval & treat/impaired mobility DX: hiatal hernia; 7/8/25 staged robotic hiatal hernia repair; Patient was noted to be with some subcutaneous air bilateral in her upper shoulders & neck, slightly extending into the face. A CXR was obtained and though subcutaneous air was noted, her lung fields were without concerning findings.  Referred By: Adela  Caregiver Feedback: Per conference w/ RN patient stable to participate in therapy  Co-Treatment: OT  Co-Treatment " Reason: to maximize patient safety & mobility  Patient Position Received: Bed, 2 rail up, Alarm on  General Comment: Pleasant & cooperative, receptive to mobility& instructions; facial edema limits ability to fully open eyes which subsequently limits vision    Home Living:  Home Living  Home Living Comments: lives w/ daughter who works, has 1 dog & 2 cats ;4steps w/ bilat rails to enter house w/ basement level laundry/rail on stairs, otherwise 1st fl setup; low but w/ hand held shower hose, has access to shower chair; raised toilet positioned near counter; standard bed, also owns lift chair    Prior Level of Function:  Prior Function Per Pt/Caregiver Report  Prior Function Comments: independent mobility no use of device, owns ww & quad cane; h/o falling w/ lt wrist fx 10/2024; independent adl; iadl shared w/ daughter; both drive; pateint works as     Precautions:  Precautions  Precautions Comment: fall, alarm, NPO, O2 1Lnc, IV    Objective     Pain:  Pain Assessment  Pain Assessment:  (facial discomfort from edema, has pain pump, pain not rated)    Cognition:  Cognition  Orientation Level: Oriented X4    General Assessments:      Activity Tolerance  Endurance: Decreased tolerance for upright activites  Sensation  Light Touch: No apparent deficits   Functional Assessments:     Bed Mobility  Bed Mobility:  (modified independent w/ hob elevated & use of rail)  Transfers  Transfer:  (cga sit<>Stand cues for safe hand placement & positioning to bed or chair)  Ambulation/Gait Training  Ambulation/Gait Training Performed:  (min assist x1 w/ ww & 2nd assist to manage equipment 40ftx2, repeated cues for maintaining safe position to ww & maintain course as tends to veer to lt or rt side)      Extremity/Trunk Assessments:        RLE   RLE :  (arom grossly wfl, strength grossly 4/5)  LLE   LLE :  (arom grossly wfl, strength grossly 4/5)    Outcome Measures:     [facility] Basic Mobility  Turning from your back to your side  while in a flat bed without using bedrails: None  Moving from lying on your back to sitting on the side of a flat bed without using bedrails: A little  Moving to and from bed to chair (including a wheelchair): A little  Standing up from a chair using your arms (e.g. wheelchair or bedside chair): A little  To walk in hospital room: A little  Climbing 3-5 steps with railing: A lot  Basic Mobility - Total Score: 18     Goals:  Encounter Problems       Encounter Problems (Active)       PT Problem       STG - Pt will transition supine <> sitting independently w/ bed flat using log rolling technique (Progressing)       Start:  07/09/25    Expected End:  07/16/25            STG - Pt will transfer STS with modified independence  (Progressing)       Start:  07/09/25    Expected End:  07/16/25            STG - Pt will amb >=100' using LRAD with modified independence  (Progressing)       Start:  07/09/25    Expected End:  07/16/25            STG -  Pt will navigate 4 stairs using rails with cga  (Progressing)       Start:  07/09/25    Expected End:  07/16/25            STG - Pt will perform 2-3 sets of BLE therex x10 to maximize functional strength and independence  (Progressing)       Start:  07/09/25    Expected End:  07/16/25                 Education Documentation  Mobility Training, taught by Laura Jurado PT at 7/9/2025 10:58 AM.  Learner: Patient  Readiness: Acceptance  Method: Explanation  Response: Verbalizes Understanding  Comment: safety, use of ww, activity progression            [1]   Patient Active Problem List  Diagnosis    Depression with anxiety    Elevated blood pressure reading    Elevated liver enzymes    Hyperlipidemia    Hypothyroidism    Menopausal state    Multinodular goiter    Class 2 severe obesity due to excess calories with serious comorbidity and body mass index (BMI) of 35.0 to 35.9 in adult    Osteopenia    Perennial allergic rhinitis    Persistent cough    Seasonal allergies    Screening for  colorectal cancer    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

## 2025-07-09 NOTE — CONSULTS
Reason For Consult  Significant     History Of Present Illness  Erin Moreau is a 75 y.o. female with PMH significant for HLD, hypothyroidism, GERD, OAB, and MDD/MALATHI who underwent elective hiatal hernia repair with Dr. Chapman on 7/8. Pt noted to have extensive upper chest wall subQ air on exam and CXR. Dr. Chapman evaluated the patient this AM and felt the subQ air was more likely related to tracheal injury rather than pleural injury. Anesthesia note states that airway was not difficult and she was successfully intubated with video laryngoscopy with 1 attempt.     Pt denies any SOB this Am and is satting well on 1L. She has extensive subQ air extending up into her face and is endorsing facial pain. Pain is improved with ice pack.      Past Medical History  As above    Surgical History  Tonsillectomy, hysterectomy, hip replacement, hiatal hernia repair     Social History  She reports that she has never smoked. She has never used smokeless tobacco. She reports current alcohol use. She reports that she does not use drugs.    Family History  Family History[1]     Allergies  Biotin, Penicillins, Adhesive tape-silicones, Amoxicillin, and Pravastatin    Physical Exam  Physical Exam  Constitutional:       General: She is not in acute distress.     Appearance: She is not ill-appearing or toxic-appearing.   HENT:      Head:      Comments: Significant facial edema and crepitus with bulla over right inner canthus     Mouth/Throat:      Mouth: Mucous membranes are moist.   Cardiovascular:      Rate and Rhythm: Normal rate and regular rhythm.      Heart sounds: Normal heart sounds.   Pulmonary:      Effort: Pulmonary effort is normal. No respiratory distress.      Breath sounds: Normal breath sounds. No stridor.      Comments: Breath sounds significantly diminished bilaterally. 1L NC  Significant crepitus of upper chest and neck  Skin:     General: Skin is warm and dry.   Neurological:      Mental Status: She is alert and oriented  "to person, place, and time.   Psychiatric:         Mood and Affect: Mood normal.         Behavior: Behavior normal.            Last Recorded Vitals  Blood pressure 130/60, pulse 67, temperature 36.7 °C (98.1 °F), temperature source Temporal, resp. rate (!) 30, height 1.626 m (5' 4.02\"), weight 94.8 kg (208 lb 15.9 oz), SpO2 92%.    Relevant Results  Results for orders placed or performed during the hospital encounter of 07/08/25 (from the past 24 hours)   CBC   Result Value Ref Range    WBC 9.7 4.4 - 11.3 x10*3/uL    nRBC 0.0 0.0 - 0.0 /100 WBCs    RBC 4.39 4.00 - 5.20 x10*6/uL    Hemoglobin 13.1 12.0 - 16.0 g/dL    Hematocrit 41.3 36.0 - 46.0 %    MCV 94 80 - 100 fL    MCH 29.8 26.0 - 34.0 pg    MCHC 31.7 (L) 32.0 - 36.0 g/dL    RDW 13.9 11.5 - 14.5 %    Platelets 237 150 - 450 x10*3/uL   Basic metabolic panel   Result Value Ref Range    Glucose 139 (H) 74 - 99 mg/dL    Sodium 137 136 - 145 mmol/L    Potassium 4.3 3.5 - 5.3 mmol/L    Chloride 101 98 - 107 mmol/L    Bicarbonate 29 21 - 32 mmol/L    Anion Gap 11 10 - 20 mmol/L    Urea Nitrogen 21 6 - 23 mg/dL    Creatinine 0.93 0.50 - 1.05 mg/dL    eGFR 64 >60 mL/min/1.73m*2    Calcium 8.7 8.6 - 10.3 mg/dL   Magnesium   Result Value Ref Range    Magnesium 1.76 1.60 - 2.40 mg/dL   Phosphorus   Result Value Ref Range    Phosphorus 4.7 2.5 - 4.9 mg/dL   CBC   Result Value Ref Range    WBC 9.7 4.4 - 11.3 x10*3/uL    nRBC 0.0 0.0 - 0.0 /100 WBCs    RBC 4.01 4.00 - 5.20 x10*6/uL    Hemoglobin 11.7 (L) 12.0 - 16.0 g/dL    Hematocrit 38.2 36.0 - 46.0 %    MCV 95 80 - 100 fL    MCH 29.2 26.0 - 34.0 pg    MCHC 30.6 (L) 32.0 - 36.0 g/dL    RDW 14.3 11.5 - 14.5 %    Platelets 239 150 - 450 x10*3/uL   Basic metabolic panel   Result Value Ref Range    Glucose 113 (H) 74 - 99 mg/dL    Sodium 139 136 - 145 mmol/L    Potassium 4.9 3.5 - 5.3 mmol/L    Chloride 101 98 - 107 mmol/L    Bicarbonate 31 21 - 32 mmol/L    Anion Gap 12 10 - 20 mmol/L    Urea Nitrogen 18 6 - 23 mg/dL    " Creatinine 0.87 0.50 - 1.05 mg/dL    eGFR 69 >60 mL/min/1.73m*2    Calcium 8.9 8.6 - 10.3 mg/dL   Magnesium   Result Value Ref Range    Magnesium 1.86 1.60 - 2.40 mg/dL     XR chest 1 view  Result Date: 7/9/2025  Interpreted By:  Ryan Jamison, STUDY: XR CHEST 1 VIEW; 7/9/2025 3:24 am   INDICATION: Signs/Symptoms:S/p Hiatal Hernia Repair with SQ Air.   COMPARISON: Chest x-ray 07/08/2025   ACCESSION NUMBER(S): [id number]   ORDERING CLINICIAN: SIDRA GILLETTE   TECHNIQUE: 1 view of the chest was performed.   FINDINGS: Trace left base atelectasis. No pleural effusion. No pneumothorax. The cardiomediastinal silhouette is stable. Extensive neck and chest wall surgical emphysema with a possible pneumomediastinum       1. Extensive/worsening neck and chest wall subcutaneous emphysema with a possible pneumomediastinum.   Signed by: Ryan Jamison 7/9/2025 7:08 AM Dictation workstation:   [id number]       Assessment/Plan   Erin Moreau is a 75 y.o. female with PMH significant for HLD, hypothyroidism, GERD, OAB, and MDD/MALATHI who underwent elective hiatal hernia repair with Dr. Chapman on 7/8. Pt noted to have extensive upper chest wall subQ air on exam and CXR. ENT consulted for possible tracheal injury.     Impression:  Extensive subcutaneous emphysema s/p hiatal hernia repair    Recommendations:   - Dr. Gardner to evaluate the patient later today  - Continue close monitoring  - Further recommendations to follow after surgeon evaluation    Ruma Millan PA-C    ADDENDUM:  On flexible laryngoscopy the patient has mild bruising at the posterior aspect of the left glottis as well as small potential injury/hematoma at the left subglottis. There is bilateral TVC motion. Visualization of the subglottis limited by patient discomfort. The trachea just inferior to the subglottis appears quite healthy without obvious significant injury.    Discussed findings with Dr. Chapman who is favoring esophagram as next step in  work-up.    Jem Gardner MD        [1]   Family History  Problem Relation Name Age of Onset    Atrial fibrillation Mother      Dementia Mother      Diabetes Mother      Transient ischemic attack Mother      Uterine cancer Mother      Hypertension Mother      Thyroid disease Sister      Uterine cancer Sister      Diabetes Maternal Grandmother      Hypertension Maternal Grandmother      Prostate cancer Paternal Grandfather      Lung cancer Paternal Grandfather      Hypertension Sister Uzma Lelo     Heart attack Sister Uzma Lind

## 2025-07-10 ENCOUNTER — APPOINTMENT (OUTPATIENT)
Dept: RADIOLOGY | Facility: HOSPITAL | Age: 75
DRG: 327 | End: 2025-07-10
Payer: COMMERCIAL

## 2025-07-10 ENCOUNTER — APPOINTMENT (OUTPATIENT)
Dept: CARDIOLOGY | Facility: HOSPITAL | Age: 75
DRG: 327 | End: 2025-07-10
Payer: COMMERCIAL

## 2025-07-10 LAB
ANION GAP SERPL CALC-SCNC: 9 MMOL/L (ref 10–20)
BUN SERPL-MCNC: 16 MG/DL (ref 6–23)
CALCIUM SERPL-MCNC: 8.6 MG/DL (ref 8.6–10.3)
CHLORIDE SERPL-SCNC: 101 MMOL/L (ref 98–107)
CO2 SERPL-SCNC: 31 MMOL/L (ref 21–32)
CREAT SERPL-MCNC: 0.73 MG/DL (ref 0.5–1.05)
EGFRCR SERPLBLD CKD-EPI 2021: 85 ML/MIN/1.73M*2
ERYTHROCYTE [DISTWIDTH] IN BLOOD BY AUTOMATED COUNT: 14.2 % (ref 11.5–14.5)
GLUCOSE SERPL-MCNC: 98 MG/DL (ref 74–99)
HCT VFR BLD AUTO: 36.1 % (ref 36–46)
HGB BLD-MCNC: 11.2 G/DL (ref 12–16)
MAGNESIUM SERPL-MCNC: 1.71 MG/DL (ref 1.6–2.4)
MCH RBC QN AUTO: 29.5 PG (ref 26–34)
MCHC RBC AUTO-ENTMCNC: 31 G/DL (ref 32–36)
MCV RBC AUTO: 95 FL (ref 80–100)
NRBC BLD-RTO: 0 /100 WBCS (ref 0–0)
PHOSPHATE SERPL-MCNC: 3.2 MG/DL (ref 2.5–4.9)
PLATELET # BLD AUTO: 176 X10*3/UL (ref 150–450)
POTASSIUM SERPL-SCNC: 4.2 MMOL/L (ref 3.5–5.3)
RBC # BLD AUTO: 3.8 X10*6/UL (ref 4–5.2)
SODIUM SERPL-SCNC: 137 MMOL/L (ref 136–145)
TSH SERPL-ACNC: 1.56 MIU/L (ref 0.44–3.98)
WBC # BLD AUTO: 6.1 X10*3/UL (ref 4.4–11.3)

## 2025-07-10 PROCEDURE — 2500000001 HC RX 250 WO HCPCS SELF ADMINISTERED DRUGS (ALT 637 FOR MEDICARE OP)

## 2025-07-10 PROCEDURE — 71045 X-RAY EXAM CHEST 1 VIEW: CPT | Performed by: STUDENT IN AN ORGANIZED HEALTH CARE EDUCATION/TRAINING PROGRAM

## 2025-07-10 PROCEDURE — 93005 ELECTROCARDIOGRAM TRACING: CPT

## 2025-07-10 PROCEDURE — 80048 BASIC METABOLIC PNL TOTAL CA: CPT | Performed by: NURSE PRACTITIONER

## 2025-07-10 PROCEDURE — 74220 X-RAY XM ESOPHAGUS 1CNTRST: CPT

## 2025-07-10 PROCEDURE — 71250 CT THORAX DX C-: CPT

## 2025-07-10 PROCEDURE — 83735 ASSAY OF MAGNESIUM: CPT | Performed by: NURSE PRACTITIONER

## 2025-07-10 PROCEDURE — 2500000004 HC RX 250 GENERAL PHARMACY W/ HCPCS (ALT 636 FOR OP/ED)

## 2025-07-10 PROCEDURE — 36415 COLL VENOUS BLD VENIPUNCTURE: CPT | Performed by: NURSE PRACTITIONER

## 2025-07-10 PROCEDURE — 2500000004 HC RX 250 GENERAL PHARMACY W/ HCPCS (ALT 636 FOR OP/ED): Performed by: NURSE PRACTITIONER

## 2025-07-10 PROCEDURE — 84443 ASSAY THYROID STIM HORMONE: CPT

## 2025-07-10 PROCEDURE — 84100 ASSAY OF PHOSPHORUS: CPT

## 2025-07-10 PROCEDURE — 2500000002 HC RX 250 W HCPCS SELF ADMINISTERED DRUGS (ALT 637 FOR MEDICARE OP, ALT 636 FOR OP/ED)

## 2025-07-10 PROCEDURE — 93010 ELECTROCARDIOGRAM REPORT: CPT | Performed by: STUDENT IN AN ORGANIZED HEALTH CARE EDUCATION/TRAINING PROGRAM

## 2025-07-10 PROCEDURE — 74220 X-RAY XM ESOPHAGUS 1CNTRST: CPT | Performed by: STUDENT IN AN ORGANIZED HEALTH CARE EDUCATION/TRAINING PROGRAM

## 2025-07-10 PROCEDURE — 99221 1ST HOSP IP/OBS SF/LOW 40: CPT

## 2025-07-10 PROCEDURE — 99233 SBSQ HOSP IP/OBS HIGH 50: CPT

## 2025-07-10 PROCEDURE — 2020000001 HC ICU ROOM DAILY

## 2025-07-10 PROCEDURE — 99291 CRITICAL CARE FIRST HOUR: CPT

## 2025-07-10 PROCEDURE — 71250 CT THORAX DX C-: CPT | Performed by: STUDENT IN AN ORGANIZED HEALTH CARE EDUCATION/TRAINING PROGRAM

## 2025-07-10 PROCEDURE — 85027 COMPLETE CBC AUTOMATED: CPT | Performed by: NURSE PRACTITIONER

## 2025-07-10 PROCEDURE — 2550000001 HC RX 255 CONTRASTS: Performed by: THORACIC SURGERY (CARDIOTHORACIC VASCULAR SURGERY)

## 2025-07-10 PROCEDURE — 71045 X-RAY EXAM CHEST 1 VIEW: CPT

## 2025-07-10 PROCEDURE — 97535 SELF CARE MNGMENT TRAINING: CPT | Mod: GO

## 2025-07-10 PROCEDURE — 2500000005 HC RX 250 GENERAL PHARMACY W/O HCPCS: Performed by: NURSE PRACTITIONER

## 2025-07-10 RX ORDER — LEVOTHYROXINE SODIUM 50 UG/1
50 TABLET ORAL DAILY
Status: DISCONTINUED | OUTPATIENT
Start: 2025-07-10 | End: 2025-07-11 | Stop reason: HOSPADM

## 2025-07-10 RX ORDER — LEVOTHYROXINE SODIUM 50 UG/1
25 TABLET ORAL DAILY
Status: DISCONTINUED | OUTPATIENT
Start: 2025-07-10 | End: 2025-07-10

## 2025-07-10 RX ORDER — AMOXICILLIN 250 MG
1 CAPSULE ORAL 2 TIMES DAILY
Status: DISCONTINUED | OUTPATIENT
Start: 2025-07-10 | End: 2025-07-11 | Stop reason: HOSPADM

## 2025-07-10 RX ORDER — PANTOPRAZOLE SODIUM 40 MG/1
40 TABLET, DELAYED RELEASE ORAL
Status: DISCONTINUED | OUTPATIENT
Start: 2025-07-11 | End: 2025-07-10

## 2025-07-10 RX ORDER — OXYCODONE HYDROCHLORIDE 5 MG/1
5 TABLET ORAL EVERY 6 HOURS PRN
Refills: 0 | Status: DISCONTINUED | OUTPATIENT
Start: 2025-07-10 | End: 2025-07-11 | Stop reason: HOSPADM

## 2025-07-10 RX ORDER — FENTANYL CITRATE 50 UG/ML
25 INJECTION, SOLUTION INTRAMUSCULAR; INTRAVENOUS
Status: DISCONTINUED | OUTPATIENT
Start: 2025-07-10 | End: 2025-07-10

## 2025-07-10 RX ORDER — CLINDAMYCIN PHOSPHATE 600 MG/50ML
600 INJECTION, SOLUTION INTRAVENOUS EVERY 8 HOURS
Status: DISCONTINUED | OUTPATIENT
Start: 2025-07-10 | End: 2025-07-11 | Stop reason: HOSPADM

## 2025-07-10 RX ORDER — ACETAMINOPHEN 325 MG/1
975 TABLET ORAL EVERY 6 HOURS PRN
Status: DISCONTINUED | OUTPATIENT
Start: 2025-07-10 | End: 2025-07-11 | Stop reason: HOSPADM

## 2025-07-10 RX ORDER — SIMETHICONE 80 MG
40 TABLET,CHEWABLE ORAL 4 TIMES DAILY PRN
Status: DISCONTINUED | OUTPATIENT
Start: 2025-07-10 | End: 2025-07-11 | Stop reason: HOSPADM

## 2025-07-10 RX ORDER — METOPROLOL TARTRATE 1 MG/ML
5 INJECTION, SOLUTION INTRAVENOUS ONCE
Status: COMPLETED | OUTPATIENT
Start: 2025-07-10 | End: 2025-07-10

## 2025-07-10 RX ORDER — LEVOTHYROXINE SODIUM 50 UG/1
50 TABLET ORAL DAILY
Status: DISCONTINUED | OUTPATIENT
Start: 2025-07-10 | End: 2025-07-10

## 2025-07-10 RX ORDER — DIATRIZOATE MEGLUMINE AND DIATRIZOATE SODIUM 660; 100 MG/ML; MG/ML
60 SOLUTION ORAL; RECTAL ONCE
Status: COMPLETED | OUTPATIENT
Start: 2025-07-10 | End: 2025-07-10

## 2025-07-10 RX ORDER — PANTOPRAZOLE SODIUM 40 MG/1
40 TABLET, DELAYED RELEASE ORAL
Status: DISCONTINUED | OUTPATIENT
Start: 2025-07-10 | End: 2025-07-11 | Stop reason: HOSPADM

## 2025-07-10 RX ORDER — POLYETHYLENE GLYCOL 3350 17 G/17G
17 POWDER, FOR SOLUTION ORAL DAILY PRN
Status: DISCONTINUED | OUTPATIENT
Start: 2025-07-10 | End: 2025-07-11 | Stop reason: HOSPADM

## 2025-07-10 RX ORDER — METOPROLOL TARTRATE 25 MG/1
12.5 TABLET, FILM COATED ORAL 2 TIMES DAILY
Status: DISCONTINUED | OUTPATIENT
Start: 2025-07-10 | End: 2025-07-11 | Stop reason: HOSPADM

## 2025-07-10 RX ORDER — LEVOTHYROXINE SODIUM 50 UG/1
50 TABLET ORAL DAILY
Status: DISCONTINUED | OUTPATIENT
Start: 2025-07-11 | End: 2025-07-10

## 2025-07-10 RX ORDER — MAGNESIUM SULFATE HEPTAHYDRATE 40 MG/ML
2 INJECTION, SOLUTION INTRAVENOUS ONCE
Status: COMPLETED | OUTPATIENT
Start: 2025-07-10 | End: 2025-07-10

## 2025-07-10 RX ADMIN — CLINDAMYCIN PHOSPHATE 600 MG: 600 INJECTION, SOLUTION INTRAVENOUS at 14:08

## 2025-07-10 RX ADMIN — DIATRIZOATE MEGLUMINE AND DIATRIZOATE SODIUM 60 ML: 600; 100 SOLUTION ORAL; RECTAL at 09:12

## 2025-07-10 RX ADMIN — METOPROLOL TARTRATE 5 MG: 1 INJECTION, SOLUTION INTRAVENOUS at 04:05

## 2025-07-10 RX ADMIN — MAGNESIUM SULFATE HEPTAHYDRATE 2 G: 40 INJECTION, SOLUTION INTRAVENOUS at 10:01

## 2025-07-10 RX ADMIN — SODIUM CHLORIDE, SODIUM LACTATE, POTASSIUM CHLORIDE, AND CALCIUM CHLORIDE 1000 ML: .6; .31; .03; .02 INJECTION, SOLUTION INTRAVENOUS at 05:37

## 2025-07-10 RX ADMIN — Medication 2 L/MIN: at 07:31

## 2025-07-10 RX ADMIN — POLYETHYLENE GLYCOL 3350 17 G: 17 POWDER, FOR SOLUTION ORAL at 14:08

## 2025-07-10 RX ADMIN — ACETAMINOPHEN 1000 MG: 10 INJECTION INTRAVENOUS at 01:36

## 2025-07-10 RX ADMIN — PANTOPRAZOLE SODIUM 40 MG: 40 TABLET, DELAYED RELEASE ORAL at 10:09

## 2025-07-10 RX ADMIN — ENOXAPARIN SODIUM 40 MG: 40 INJECTION SUBCUTANEOUS at 16:59

## 2025-07-10 RX ADMIN — CLINDAMYCIN PHOSPHATE 600 MG: 600 INJECTION, SOLUTION INTRAVENOUS at 21:00

## 2025-07-10 RX ADMIN — SODIUM CHLORIDE, SODIUM LACTATE, POTASSIUM CHLORIDE, AND CALCIUM CHLORIDE 1000 ML: .6; .31; .03; .02 INJECTION, SOLUTION INTRAVENOUS at 04:17

## 2025-07-10 RX ADMIN — SENNOSIDES AND DOCUSATE SODIUM 1 TABLET: 50; 8.6 TABLET ORAL at 10:09

## 2025-07-10 RX ADMIN — LEVOTHYROXINE SODIUM 50 MCG: 0.05 TABLET ORAL at 10:09

## 2025-07-10 RX ADMIN — ACETAMINOPHEN 975 MG: 325 TABLET ORAL at 17:09

## 2025-07-10 RX ADMIN — SENNOSIDES AND DOCUSATE SODIUM 1 TABLET: 50; 8.6 TABLET ORAL at 21:01

## 2025-07-10 RX ADMIN — Medication 2 L/MIN: at 21:15

## 2025-07-10 RX ADMIN — FLUTICASONE PROPIONATE 2 SPRAY: 50 SPRAY, METERED NASAL at 10:03

## 2025-07-10 ASSESSMENT — COGNITIVE AND FUNCTIONAL STATUS - GENERAL
MOBILITY SCORE: 18
CLIMB 3 TO 5 STEPS WITH RAILING: A LITTLE
HELP NEEDED FOR BATHING: A LITTLE
MOVING TO AND FROM BED TO CHAIR: A LITTLE
TURNING FROM BACK TO SIDE WHILE IN FLAT BAD: A LITTLE
TOILETING: A LITTLE
DAILY ACTIVITIY SCORE: 20
MOVING FROM LYING ON BACK TO SITTING ON SIDE OF FLAT BED WITH BEDRAILS: A LITTLE
STANDING UP FROM CHAIR USING ARMS: A LITTLE
DRESSING REGULAR LOWER BODY CLOTHING: A LITTLE
DAILY ACTIVITIY SCORE: 24
DRESSING REGULAR UPPER BODY CLOTHING: A LITTLE
WALKING IN HOSPITAL ROOM: A LITTLE
MOBILITY SCORE: 24

## 2025-07-10 ASSESSMENT — PAIN - FUNCTIONAL ASSESSMENT
PAIN_FUNCTIONAL_ASSESSMENT: 0-10

## 2025-07-10 ASSESSMENT — ENCOUNTER SYMPTOMS
DYSPNEA ON EXERTION: 0
BLOATING: 1
FOCAL WEAKNESS: 0
FREQUENCY: 0
IRREGULAR HEARTBEAT: 1
VOMITING: 0
HOARSE VOICE: 0
SORE THROAT: 0
COUGH: 0
ABDOMINAL PAIN: 0
DOUBLE VISION: 0
MYALGIAS: 0
ALTERED MENTAL STATUS: 0
NAUSEA: 0
DECREASED APPETITE: 0
STRIDOR: 0
PALPITATIONS: 0
MUSCLE CRAMPS: 0
HEADACHES: 1
LIGHT-HEADEDNESS: 0
BLURRED VISION: 0
SHORTNESS OF BREATH: 0
DIZZINESS: 0
CONSTIPATION: 1
WEAKNESS: 1

## 2025-07-10 ASSESSMENT — ACTIVITIES OF DAILY LIVING (ADL): HOME_MANAGEMENT_TIME_ENTRY: 13

## 2025-07-10 ASSESSMENT — PAIN DESCRIPTION - DESCRIPTORS
DESCRIPTORS: ACHING
DESCRIPTORS: HEADACHE
DESCRIPTORS: ACHING
DESCRIPTORS: HEADACHE;ACHING

## 2025-07-10 ASSESSMENT — PAIN SCALES - GENERAL
PAINLEVEL_OUTOF10: 0 - NO PAIN
PAINLEVEL_OUTOF10: 0 - NO PAIN
PAINLEVEL_OUTOF10: 2
PAINLEVEL_OUTOF10: 2
PAINLEVEL_OUTOF10: 3
PAINLEVEL_OUTOF10: 0 - NO PAIN
PAINLEVEL_OUTOF10: 2
PAINLEVEL_OUTOF10: 3
PAINLEVEL_OUTOF10: 0 - NO PAIN
PAINLEVEL_OUTOF10: 3
PAINLEVEL_OUTOF10: 3
PAINLEVEL_OUTOF10: 2
PAINLEVEL_OUTOF10: 2
PAINLEVEL_OUTOF10: 3

## 2025-07-10 ASSESSMENT — PAIN DESCRIPTION - LOCATION: LOCATION: HEAD

## 2025-07-10 NOTE — PROGRESS NOTES
7/10/2025  Followed up with pt and daughter in room, introduced self and explained role.  Per pt, ok to speak with dtr present.  Verified plan is for [facility] upon discharge.  Pt has a walker at home. Daughter supportive.  Pt does not feel she will have any further needs upon discharge.   Nae Dang RN [facility]

## 2025-07-10 NOTE — CONSULTS
"                                                                  ICU Consult Note          PATIENT NAME: Dunia Moreau  MRN: [id number]    SERVICE DATE:  7/10/2025  SERVICE TIME:  3:01 AM    Dunia Moreau \"Erin\" is a 75 y.o. female on day 0 of admission presenting with Hiatal hernia.      HPI:  This is a 76 yo F with PMHx significant for hiatal hernia, GERD, hypothyroidism, and obesity, presenting to the ICU on 7/8/25 for staged robotic hiatal hernia repair performed by Dr. Chapman. She developed subcutaneous emphysema, prompting her to be transferred from regular medicine floor to CICU for close monitoring. She was evaluated by ENT with laryngoscopy and an esophagram has been ordered.     Overnight, she went into atrial fibrillation with RVR sustaining in 130's and the ICU was consulted.      Review of Systems   Respiratory:  Negative for cough and shortness of breath.    Cardiovascular:  Negative for chest pain and palpitations.   Gastrointestinal:  Negative for abdominal pain, nausea and vomiting.   Neurological:  Positive for headaches.     OBJECTIVE    Vitals:    07/09/25 2300 07/10/25 0000 07/10/25 0100 07/10/25 0200   BP: 116/58 128/59 146/84 107/71   BP Location: Right arm      Patient Position: Lying      Pulse: 67 69 (!) 133 (!) 122   Resp: 19 18 15 16   Temp:  36.1 °C (97 °F)     TempSrc:  Temporal     SpO2: 94% 93% 99% 91%   Weight:       Height:          Results from last 7 days   Lab Units 07/09/25  0639   WBC AUTO x10*3/uL 9.7   HEMOGLOBIN g/dL 11.7*   HEMATOCRIT % 38.2   PLATELETS AUTO x10*3/uL 239     Results from last 7 days   Lab Units 07/09/25  0639   SODIUM mmol/L 139   POTASSIUM mmol/L 4.9   CHLORIDE mmol/L 101   CO2 mmol/L 31   BUN mg/dL 18   CREATININE mg/dL 0.87   CALCIUM mg/dL 8.9   GLUCOSE mg/dL 113*       Scheduled Medications  Scheduled Medications[1]       Physical Exam  Constitutional:       General: She is not in acute distress.     Appearance: She is not toxic-appearing.   HENT: "      Head:      Comments: Subcutaneous emphysema to her eyelids   Cardiovascular:      Rate and Rhythm: Tachycardia present. Rhythm irregular.      Pulses: Normal pulses.   Pulmonary:      Breath sounds: No wheezing, rhonchi or rales.      Comments: NC  Crepitus to her chest wall and upper arm  Abdominal:      Palpations: Abdomen is soft.      Tenderness: There is no abdominal tenderness.      Comments: Abdominal surgical sites with overlying glue, clean, dry, and intact.    Musculoskeletal:      Right lower leg: No edema.      Left lower leg: No edema.   Skin:     General: Skin is warm and dry.      Capillary Refill: Capillary refill takes less than 2 seconds.   Neurological:      Mental Status: She is alert and oriented to person, place, and time. Mental status is at baseline.      Sensory: No sensory deficit.      Motor: No weakness.          ASSESSMENT & PLAN  Dunia Moreau is a 75 y.o. year old female patient with PMHx significant for hiatal hernia, GERD, hypothyroidism, and obesity, presenting to the ICU on 7/8/25 for staged robotic hiatal hernia repair performed by Dr. Chapman. On POD2, she went into atrial fibrillation with RVR and ICU consult placed.     Daily Progress:  7/8: underwent robotic hiatal hernia repair by Dr. Chapman   7/9: Overnight developed subcutaneous emphysema, evaluated by ENT, and transferred to CICU for further monitoring  7/10: Patient went into atrial fibrillation with RVR overnight, ICU consulted     #Atrial fibrillation with RVR - likely 2/2 to intravascular dehydration   ::Bedside POCUS: IVC collapsible >50% with respirations  -- metoprolol for rate control  -- begin IVF    #Hiatal hernia POD2 c/b subcutaneous emphysema   #Acute respiratory insufficiency   #Acute post-op pain  -- per primary   -- PCA pump  -- on NC     #Hypothyroidism  -- continue with levothyroxine     Ethics/Code Status:  Full Code     ICU Checklist:  Vent/O2:    Lines/Devices: peripheral IV  Indwelling Catheters:    Drips: PCA pump    ATBx: ---  Fluids: LR   Diet: NPO   Glycemic Control: ---  PPX: protonix  DVT PPX: Lovenox   Restraints: ---  Code status: Full Code   Dispo: ICU    Care Time: 35 minutes spent in preparing to see patient (I.e. review of medical records), evaluation of diagnostics (I.e. labs, imaging, etc.), documentation, discussing plan of care with patient/ family/ caregiver, and/ or coordination of care with multidisciplinary team. Time does not include completion of procedure time.     Clarissa Ramos PA-C  Pulmonary and Critical Care  July 10, 2025 3:01 AM            [1] acetaminophen, 1,000 mg, intravenous, q6h  enoxaparin, 40 mg, subcutaneous, q24h  fluticasone, 2 spray, Each Nostril, Daily  levothyroxine, 25 mcg, intravenous, q24h ARI  oxygen, , inhalation, Continuous - Inhalation  pantoprazole, 40 mg, intravenous, Daily

## 2025-07-10 NOTE — PROGRESS NOTES
"Occupational Therapy    Occupational Therapy Treatment    Name: Dunia Moreau \"Erin\"  MRN: [id number]  Department: [facility]  Room: [address] [place] [address]  Date: 07/10/25  Time Calculation  Start Time: 1321  Stop Time: 1334  Time Calculation (min): 13 min    Assessment:  OT Assessment: Pt. progressing toward functional goals, will continue with OT treatment plan to address goals/promote independence with adl/transfers/mobility.  Prognosis: Good  Barriers to Discharge Home: No anticipated barriers  End of Session Communication: Bedside nurse  End of Session Patient Position: Up in chair, Alarm off, not on at start of session  Plan:  Treatment Interventions: ADL retraining, Functional transfer training, UE strengthening/ROM, Endurance training, Equipment evaluation/education, Fine motor coordination activities, Compensatory technique education  OT Frequency: 3 times per week  OT Discharge Recommendations: Low intensity level of continued care  OT - OK to Discharge: Yes (once medically appropriate to next level of care)    Subjective     OT Visit Info:  OT Received On: 07/10/25  General:  General  Caregiver Feedback: daughter  Co-Treatment: PT  Co-Treatment Reason: to maximize patient safety/abilities  Prior to Session Communication: Bedside nurse  Patient Position Received: Up in chair, Alarm off, caregiver present  General Comment: pt. agreeable to therapy interventions  Precautions:  Precautions Comment: VSS, falls, tele     Date/Time Vitals Session Patient Position Pulse Resp SpO2 BP MAP (mmHg)    07/10/25 1400 --  --  71  --  --  --  --     07/10/25 1500 --  --  60  --  94 %  120/60  84                Pain Assessment:  Pain Assessment  Pain Assessment: 0-10  0-10 (Numeric) Pain Score:  (no pain complaints)    Objective   Cognition:  Overall Cognitive Status: Within Functional Limits  Activities of Daily Living: LE Dressing  LE Dressing:  (pt. able to flex forward to adjust socks, sba)    Functional Standing " Tolerance:  Functional Standing Tolerance  Functional Standing Tolerance Comments: sba, wh. walker support in preparation for functional mobility  Bed Mobility/Transfers:      Transfers  Transfer:  (sit<> stand from recliner chair:  sba)      Functional Mobility:  Functional Mobility  Functional Mobility Performed:  (mobility via wh. walker completed with sba, house hold distances)      Outcome Measures:  [facility] Daily Activity  Putting on and taking off regular lower body clothing: A little  Bathing (including washing, rinsing, drying): A little  Putting on and taking off regular upper body clothing: A little  Toileting, which includes using toilet, bedpan or urinal: A little  Taking care of personal grooming such as brushing teeth: None  Eating Meals: None  Daily Activity - Total Score: 20         and Early Mobility/Exercise Safety Screen: Proceed with mobilization - No exclusion criteria met  ICU Mobility Scale: Walking with assistance of 1 person [8]    Education Documentation  Body Mechanics, taught by Waleska Zuniga OT at 7/10/2025  3:40 PM.  Learner: Patient  Readiness: Acceptance  Method: Explanation  Response: Verbalizes Understanding, Needs Reinforcement  Comment: energy conservation    ADL Training, taught by Waleska Zuniga OT at 7/10/2025  3:40 PM.  Learner: Patient  Readiness: Acceptance  Method: Explanation  Response: Verbalizes Understanding, Needs Reinforcement  Comment: energy conservation      Goals:  Encounter Problems       Encounter Problems (Active)       OT Goals       STG- patient will complete LB dressing at SBA with use of ae/ad/dme prn (Progressing)       Start:  07/09/25    Expected End:  07/23/25            STG- patient will complete toileting with SBA with use of ae/ad/dme prn  (Progressing)       Start:  07/09/25    Expected End:  07/23/25            STG- patient will complete transfers to/from bed, chair, commode at SBA with use of ae/ad/dme prn (Progressing)       Start:  07/09/25     Expected End:  07/23/25            STG- patient will complete simple mobility at SBA with use of ae/ad/dme prn  (Progressing)       Start:  07/09/25    Expected End:  07/23/25            STG- patient will complete grooming at SBA with use of ae/ad/dme prn  (Progressing)       Start:  07/09/25    Expected End:  07/23/25

## 2025-07-10 NOTE — TREATMENT PLAN
Radiographic imaging performed today documents no esophageal perforation, but the CT scan of the chest does suggest a posterior/membranous trachea injury.  The patient does have atrial fibrillation and an oxygen requirement of 2 L, but is clinically overall stable and does not have signs of mediastinitis.  Her subcutaneous emphysema is improving and she reports no significant symptoms at this time.  Typically, conservative/nonoperative management of tracheal injuries is associated with a better prognosis, and I believe it is appropriate to offer this patient ongoing observational management.  If she develops signs of mediastinitis including fevers or hemodynamic instability or develops uncontrolled subcutaneous emphysema, I would recommend consideration of operative repair.      I communicated this plan with my nurse practitioner [facility]

## 2025-07-10 NOTE — PROGRESS NOTES
"Physical Therapy    Physical Therapy Treatment    Patient Name: Dunia Moreau \"Izzy"  MRN: [id number]  Today's Date: 7/10/2025  Time Calculation  Start Time: 1319  Stop Time: 1334  Time Calculation (min): 15 min     171/171-A    Assessment/Plan   PT Assessment  PT Assessment Results: Decreased strength, Decreased endurance, Impaired balance, Decreased mobility, Decreased safety awareness, Pain, Impaired vision  Rehab Prognosis: Good  Barriers to Discharge Home: No anticipated barriers  Evaluation/Treatment Tolerance: Patient limited by fatigue, Patient limited by pain  End of Session Communication: Bedside nurse  Assessment Comment: Pt would continue to benefit from further physical therapy to address deficits in functional mobility and gait.  End of Session Patient Position: Up in chair, Alarm off, not on at start of session     PT Plan  Treatment/Interventions: Bed mobility, Transfer training, Gait training, Stair training, Balance training, Therapeutic exercise, Therapeutic activity  PT Plan: Ongoing PT  PT Frequency: 3 times per week  PT Discharge Recommendations: Low intensity level of continued care (w/ initial 24hr support for safe transition home postop)  PT Recommended Transfer Status: Assist x1  PT - OK to Discharge: Yes (when cleared by medical team)    General Visit Information:   PT  Visit  PT Received On: 07/10/25  General  Caregiver Feedback:  (daughter)  Co-Treatment: OT  Co-Treatment Reason: to maximize safety and functional mobility  Prior to Session Communication: Bedside nurse  Patient Position Received: Up in chair, Alarm off, not on at start of session  General Comment: Pt resting in chair upon entering, agreeable to PT.  Subjective     Precautions:  Precautions  Precautions Comment: falls, tele    Vital Signs:  Vital Signs  Vital Signs Comment: VSS throughout session  Objective     Pain:  Pain Assessment  Pain Assessment: 0-10  0-10 (Numeric) Pain Score: 0 - No " pain    Cognition:  Cognition  Overall Cognitive Status: Within Functional Limits    Activity Tolerance:  Activity Tolerance  Early Mobility/Exercise Safety Screen: Proceed with mobilization - No exclusion criteria met    Treatments:           Bed Mobility  Bed Mobility: Yes  Bed Mobility 1  Bed Mobility Comments 1: supine<>sit not assessed, pt up in chair  Ambulation/Gait Training  Ambulation/Gait Training Performed:  (Pt ambulates >150ft with SBA with no acute LOB. Pt requested to use WW. Pt stable with quick joey.)  Transfers  Transfer: Yes  Transfer 1  Trials/Comments 1: sit to stand SBA without device then used device for ambulation          Outcome Measures:     [facility] Basic Mobility  Turning from your back to your side while in a flat bed without using bedrails: A little  Moving from lying on your back to sitting on the side of a flat bed without using bedrails: A little  Moving to and from bed to chair (including a wheelchair): A little  Standing up from a chair using your arms (e.g. wheelchair or bedside chair): A little  To walk in hospital room: A little  Climbing 3-5 steps with railing: A little  Basic Mobility - Total Score: 18              FSS-ICU  Ambulation: Walks >/ or equal to 150 feet with supervision  Rolling: Supervision or set-up only  Sitting: Supervision or set-up only  Transfer Sit-to-Stand: Supervision or set-up only  Transfer Supine-to-Sit: Supervision or set-up only  Total Score: 25  ICU Mobility Screen  Early Mobility/Exercise Safety Screen: Proceed with mobilization - No exclusion criteria met  ICU Mobility Scale: Walking with assistance of 1 person  E = Exercise and Early Mobility  Early Mobility/Exercise Safety Screen: Proceed with mobilization - No exclusion criteria met  ICU Mobility Scale: Walking with assistance of 1 person                 Education Documentation  Mobility Training, taught by Michelle Vazquez PT at 7/10/2025  4:03 PM.  Learner: Patient  Readiness:  Acceptance  Method: Explanation  Response: Verbalizes Understanding  Comment: PT POC    Education Comments  No comments found.      ICU Mobility Scale: Walking with assistance of 1 person [8]    EDUCATION:     Encounter Problems       Encounter Problems (Active)       PT Problem       STG - Pt will transition supine <> sitting independently w/ bed flat using log rolling technique (Progressing)       Start:  07/09/25    Expected End:  07/16/25            STG - Pt will transfer STS with modified independence  (Progressing)       Start:  07/09/25    Expected End:  07/16/25            STG - Pt will amb >=100' using LRAD with modified independence  (Progressing)       Start:  07/09/25    Expected End:  07/16/25            STG -  Pt will navigate 4 stairs using rails with cga  (Progressing)       Start:  07/09/25    Expected End:  07/16/25            STG - Pt will perform 2-3 sets of BLE therex x10 to maximize functional strength and independence  (Progressing)       Start:  07/09/25    Expected End:  07/16/25

## 2025-07-10 NOTE — CARE PLAN
The clinical goals for the shift include Pt will remain hemodynamically stable    Problem: Pain - Adult  Goal: Verbalizes/displays adequate comfort level or baseline comfort level  Outcome: Progressing     Problem: Safety - Adult  Goal: Free from fall injury  Outcome: Progressing     Problem: Discharge Planning  Goal: Discharge to home or other facility with appropriate resources  Outcome: Progressing     Problem: Chronic Conditions and Co-morbidities  Goal: Patient's chronic conditions and co-morbidity symptoms are monitored and maintained or improved  Outcome: Progressing     Problem: Nutrition  Goal: Nutrient intake appropriate for maintaining nutritional needs  Outcome: Progressing     Problem: Skin  Goal: Decreased wound size/increased tissue granulation at next dressing change  Outcome: Progressing  Flowsheets (Taken 7/10/2025 1816)  Decreased wound size/increased tissue granulation at next dressing change: Promote sleep for wound healing  Goal: Promote/optimize nutrition  Outcome: Progressing  Flowsheets (Taken 7/10/2025 1816)  Promote/optimize nutrition: Consume > 50% meals/supplements

## 2025-07-10 NOTE — PROGRESS NOTES
"[facility]   Cardiothoracic Surgery   Progress Note        Dunia Moreau \"Erin\" is a 75 y.o. female on day 0 of admission presenting with Hiatal hernia.    Subjective     Interval HPI:   Patient seen this morning while laying in bed; transport at bedside for EGD. Reports that swelling in her face/eyes is better and that she is able to see today; however, crepitus and subcutaneous air still noted on exam. Pain well controlled bedsides some gas discomfort. Slept \"OK\" overnight.      Review of Systems   Constitutional: Positive for malaise/fatigue. Negative for decreased appetite.   HENT:  Negative for congestion, hoarse voice, sore throat and stridor.    Eyes:  Negative for blurred vision and double vision.   Cardiovascular:  Positive for irregular heartbeat. Negative for chest pain, dyspnea on exertion, leg swelling and palpitations.   Respiratory:  Negative for cough and shortness of breath.    Musculoskeletal:  Negative for muscle cramps, muscle weakness and myalgias.   Gastrointestinal:  Positive for bloating and constipation. Negative for nausea and vomiting.   Genitourinary:  Negative for frequency and urgency.   Neurological:  Positive for weakness. Negative for dizziness, focal weakness and light-headedness.   Psychiatric/Behavioral:  Negative for altered mental status.          Objective   Physical Exam  Physical Exam  Vitals and nursing note reviewed.   Constitutional:       General: She is not in acute distress.     Comments: Subcutaneous emphysema on face  Crepitus noted under eyes bilteral    Cardiovascular:      Rate and Rhythm: Rhythm irregular.   Pulmonary:      Effort: No accessory muscle usage or respiratory distress.      Breath sounds: Examination of the right-lower field reveals decreased breath sounds. Examination of the left-lower field reveals decreased breath sounds. Decreased breath sounds present.   Abdominal:      General: Bowel sounds are decreased.     "  Palpations: Abdomen is soft.      Tenderness: There is no abdominal tenderness.      Comments: Surgical incisions    Skin:     General: Skin is warm and dry.      Capillary Refill: Capillary refill takes less than 2 seconds.   Neurological:      General: No focal deficit present.      Mental Status: She is alert and oriented to person, place, and time. Mental status is at baseline.   Psychiatric:         Behavior: Behavior is cooperative.         Last Recorded Vitals  Vitals:    07/10/25 0800 07/10/25 0900 07/10/25 1000 07/10/25 1100   BP: 110/81      BP Location:       Patient Position:       Pulse: (!) 123 (!) 122 77 73   Resp: 17      Temp: 36.3 °C (97.3 °F)      TempSrc: Temporal      SpO2: 91%  93% 94%   Weight:       Height:            Intake/Output last 3 Shifts:  I/O last 3 completed shifts:  In: 200 (2.1 mL/kg) [IV Piggyback:200]  Out: 601 (6.2 mL/kg) [Urine:601 (0.2 mL/kg/hr)]  Weight: 96.4 kg     Inpatient Medications  Scheduled Medications[1]  Continuous medications  Continuous Medications[2]  PRN medications  PRN Medications[3]     LDA:       Relevant Results  Lab Review  Results from last 7 days   Lab Units 07/10/25  0504   WBC AUTO x10*3/uL 6.1   HEMOGLOBIN g/dL 11.2*   HEMATOCRIT % 36.1   PLATELETS AUTO x10*3/uL 176     Results from last 7 days   Lab Units 07/10/25  0504   SODIUM mmol/L 137   POTASSIUM mmol/L 4.2   CHLORIDE mmol/L 101   CO2 mmol/L 31   BUN mg/dL 16   CREATININE mg/dL 0.73   CALCIUM mg/dL 8.6   GLUCOSE mg/dL 98     Results from last 7 days   Lab Units 07/10/25  0504   MAGNESIUM mg/dL 1.71             Radiographic Testing    Chest Imaging:  CT chest wo IV contrast   Final Result   1. Extensive subcutaneous neck/chest wall surgical emphysema as well   as pneumomediastinum. Apparent irregular contour of the   posterior/left posterior mid tracheal wall which is highly concerning   for tracheal injury/site of air leak.   2. Right apical ground-glass density nodule measuring 1 cm that  would   warrant follow-up as it could represent infectious or inflammatory   process, however low-grade neoplasm could not be entirely excluded.   Please see below guidelines for suggested follow-up   3. Complex right renal cyst measuring 7.4 cm which would warrant   further assessment by outpatient nonemergent renal MRI with IV   contrast.        The results of the study were relayed by me to and acknowledged by   Jairon Chapman MD/Thoracic surgery on 07/10/2025 at 10:35 a.m.   through [employer] secure chat        Incidental Finding:   A ground glass pulmonary nodule measuring 6 mm   or larger.  (**-YCF-**)        Instructions:  Consider follow up non contrast chest CT at 6-12   months to confirm persistence, then consider CT chest every 2 years   until 5 years. (Gene Spivey et al., Guidelines for management of   incidental pulmonary nodules detected on CT images: From the   Fleischner Society 2017, Radiology. 2017 Jul;284 (1):228-243.)   [email or web address]        Signed by: Ryan Jamison 7/10/2025 10:45 AM   Dictation workstation:   [id number]      FL GI esophagram   Final Result   1. No evidence of extravasation of contrast to suggest esophageal   injury/leak.        MACRO:   None        Signed by: Ryan Jamison 7/10/2025 9:35 AM   Dictation workstation:   [id number]      XR chest 1 view   Final Result   1. No significant interval change regarding the extensive   subcutaneous edema and pneumomediastinum        Signed by: Ryan Jamison 7/10/2025 7:27 AM   Dictation workstation:   [id number]      XR chest 1 view   Final Result   1. Extensive/worsening neck and chest wall subcutaneous emphysema   with a possible pneumomediastinum.        Signed by: Ryan Jamison 7/9/2025 7:08 AM   Dictation workstation:   [id number]      XR chest 1 view   Final Result   No focal infiltrate or pneumothorax is identified.        MACRO:   None.        Signed by: Chalo Razo 7/8/2025 2:08 PM   Dictation workstation:   [id number]  "               History of Present Illness: Dunia Moreau \"Erin\" is a 75 y.o. female with a PMH significant for Hiatal Hernia with GERD, hypothyroidism, and obesity (BMI 35) who presents to [facility] on 7/8/2025 for a staged robotic hiatal hernia repair with thoracic surgeon, Dr. Jairon Chapman.     This patient presented to her gastroenterologist with worsening symptoms of reflux and dysphagia.  She notes dysphagia to solid foods such as bread rice and chicken.  She has been treating this with Pepcid without improvement.  Her gastroenterologist (Enrique) recommended an EGD, which was performed on 1/24/2024 and showed a small type II hiatal hernia with the GE junction at 38 cm from the incisors.  Biopsies of the GE junction showed no evidence of Johnston's esophagus.  Her gastro doctor also recommended twice daily PPI treatment, which has helped her symptoms, but they remain significant.     Daily Events    7/9/25: This morning, the patient was noted to be with slightly worse subcutaneous emphysema with facial swelling slightly worse. However, she's without respiratory concerns; remains on RA and is hemodynamically stable.   However, given her subcutaneous air, ENT is consulted for a more urgent upper airway endoscopy to rule out an unlikely tracheal injury.     Plan to keep the patient on a strict NPO. Will move to ICU for closer observation and given this environment will be better equip for invasive bedside procedures.   - Current O2 Requirements: RA    7/10/25: Overnight went into A fib RVR in the 130s. ICU was consulted- patient received IV lopressor and IV fluids. This AM continues to be in A fib- started beta blocker and consult cardiology to establish care. Subcutaneous air and crepitus noted to face; however, able to see today and open her eyes. EGD completed- no leak; diet advanced to clears. CXR with ?pneumomediastinum- obtained a chest CT which revealed a posterior " tracheal injury concerning for the site of air leak > plan to start prophylactic antibiotics and continue to monitor.  -Current O2 requirements: 2L NC      Assessment & Plan    Hiatal Hernia    Posterior tracheal injury    Subcutaneous air  - S/p robotic hiatal hernia repair on 7/8/25 with Thoracic Surgeon, Dr. Jairon Chapman   ---> Post operative with subcutaneous air   -Upper airway endoscopy 7/9 with mild to moderate glottic edema, ecchymosis of left posterior glottis, small area of ecchymosis vs hematoma of left lateral subglottic wall   -Esophagram 7/10 without evidence of leak  - Continue on PPI     -CT chest 7/10:  Apparent irregular contour of the posterior/left posterior mid tracheal wall which is highly concerning for tracheal injury/site of air leak  -Currently no signs of mediastinitis  -Non operative management for now : Clindamycin 600mg q8 hour due to PCN and amoxicillin allergies  -If develops signs of mediastinitis (fever, uncontrolled subcutaneous air, hemodynamic instability) will consider operative management     Acute Respiratory Insufficiency   - As anticipated in the immediate post operative period   - Current O2 Requirements: 2L NC  7/9: Room air  7/10 : 2L NC  - IS/Deep Breathing Exercises, OOB       Atrial Fibrillation, new onset  -New onset a fib RVR overnight on 7/10  -Received 5mg IV lopressor x1 dose and IVF  -Cardiology consulted  -[id number] score 3  -Self converted mid morning 7/10       Acute Post-Op Pain  - As expected   - Discontinue PCA this AM; PRN pain medications ordered   - Bowel regimen started        Hypothyroidism   - Home Medication: Levothyroxine 50mg every day  - Resume home PO synthroid  - TSH level this AM: 1.56      Volume overload  -Pre-op Weight: 94.8kg   7/10: 96.4 kg - did receive 2L IV fluids overnight- if weight increases tomorrow/ increased O2 requirements consider starting PO lasix       Right apical ground glass density    Right renal cyst  -Incidental  findings on 7/10 chest CT  -Complex right renal cyst measuring 7.4 cm  -Radiology recommend outpatient renal MRI with IV contrast  -1cm ground glass density nodule in right apical   -Recommend outpatient CT chest at 6-12 months    Prophylaxis  - GI: PPI  - DVT: Juve-Hose & enoxaparin  - MRSA: Negative (6/26)  - PT/OT      Disposition  - CVICU (stepdown status)     Above patient and plan discussed with thoracic surgeon, Dr. Diana Chapman.     Marly Pastrana, APRN-CNP             [1] clindamycin, 600 mg, intravenous, q8h  enoxaparin, 40 mg, subcutaneous, q24h  fluticasone, 2 spray, Each Nostril, Daily  levothyroxine, 50 mcg, oral, Daily  magnesium sulfate, 2 g, intravenous, Once  metoprolol tartrate, 12.5 mg, oral, BID  oxygen, , inhalation, Continuous - Inhalation  pantoprazole, 40 mg, oral, Daily before breakfast  sennosides-docusate sodium, 1 tablet, oral, BID     [2]    [3] PRN medications: acetaminophen, albuterol, metoclopramide, naloxone, ondansetron ODT **OR** ondansetron, oxyCODONE, polyethylene glycol

## 2025-07-10 NOTE — PROGRESS NOTES
"                                                                ICU Progress Note       PATIENT NAME: Dunia Moreau  MRN: [id number]    SERVICE DATE:  7/10/2025  SERVICE TIME:  5:01 PM      Dunia Moreau \"Erin\" is a 75 y.o. female on day 0 of admission presenting with Hiatal hernia.      SUBJECTIVE    Patient seen and examined at bedside. She reports feeling well and endorses wanting to go home.    OBJECTIVE    Vitals:    07/10/25 1300 07/10/25 1400 07/10/25 1500 07/10/25 1600   BP: 118/76  120/60 120/59   BP Location:       Patient Position:       Pulse: 74 71 60 60   Resp:       Temp:       TempSrc:       SpO2: 90%  94% 100%   Weight:       Height:          Results from last 7 days   Lab Units 07/10/25  0504   WBC AUTO x10*3/uL 6.1   HEMOGLOBIN g/dL 11.2*   HEMATOCRIT % 36.1   PLATELETS AUTO x10*3/uL 176     Results from last 7 days   Lab Units 07/10/25  0504   SODIUM mmol/L 137   POTASSIUM mmol/L 4.2   CHLORIDE mmol/L 101   CO2 mmol/L 31   BUN mg/dL 16   CREATININE mg/dL 0.73   CALCIUM mg/dL 8.6   GLUCOSE mg/dL 98       Scheduled Medications  Scheduled Medications[1]     Physical Exam  Physical Exam  Constitutional:       Appearance: Normal appearance. She is obese.   HENT:      Mouth/Throat:      Mouth: Mucous membranes are moist.      Pharynx: Oropharynx is clear.   Eyes:      Extraocular Movements: Extraocular movements intact.      Conjunctiva/sclera: Conjunctivae normal.   Cardiovascular:      Rate and Rhythm: Normal rate and regular rhythm.   Pulmonary:      Effort: Pulmonary effort is normal.      Breath sounds: No wheezing, rhonchi or rales.      Comments: Crepitus in chest wall.   Abdominal:      General: Abdomen is flat.      Palpations: Abdomen is soft.      Tenderness: There is abdominal tenderness.   Musculoskeletal:      Right lower leg: No edema.      Left lower leg: No edema.   Skin:     General: Skin is warm and dry.   Neurological:      General: No focal deficit present.      Mental " Status: She is alert.            ASSESSMENT & PLAN  76 yo F with PMHx significant for hiatal hernia, GERD, hypothyroidism, and obesity, presenting to the ICU on 7/8/25 for staged robotic hiatal hernia repair performed by Dr. Chapman. She developed subcutaneous emphysema, prompting her to be transferred from regular medicine floor to CICU for close monitoring.     Pt  presenting to [facility] with No chief complaint on file.       Daily Progress:  7/10: posterior tracheal tear seen on CT. Back in sinus rhythm.     Neuro:  #Acute post-op pain  -no acute concerns  -pain control: PRN tylenol, roxicodone  -ICU delirium precautions    CV:  #Afib with RVR  -in sinus rhythm  -metoprolol tartrate 12.5 mg BID    Respiratory:   #Acute respiratory insufficiency   -continue nasal cannula, wean as tolerated    GI:  #Hiatal hernia s/p repair c/b subcutaneous emphysema  -esophogram: no evidence of extravasation of contrast to suggest esophageal injury/leak  -CT chest: extensive subcu neck/chest wall surgical emphysema. Irregular contour of posterior/left mid tracheal wall c/f tracheal air leak.   PLAN:  -ENT following  -CTS as primary   -If patient develops signs of mediastinitis (fevers or hemodynamic instability) contact CTS for operative repair.     Endo:  #Hypothyroidism  -continue home levothyroxine    Renal:  -no acute issues  -Trend BMP    Hematological:  #Acute post-op anemia  -Trend CBC  -transfuse for Hgb < 7    ID:  -clindamycin for tracheal leak     Vent/O2:   NC  Lines/Devices: PIV  Drips: --  ATBx: clindamycin  Fluids: --  Diet: CLD  PPX: PPI  DVT PPX: lovenox  Code status: full code  Dispo: ICU    Ienssa Mcgovern DO PGY-2 Internal Medicine  This is a preliminary note, please await attending attestation for final A/P.   July 10, 2025 5:01 PM              [1] clindamycin, 600 mg, intravenous, q8h  enoxaparin, 40 mg, subcutaneous, q24h  fluticasone, 2 spray, Each Nostril, Daily  levothyroxine, 50 mcg, oral, Daily  metoprolol  tartrate, 12.5 mg, oral, BID  oxygen, , inhalation, Continuous - Inhalation  pantoprazole, 40 mg, oral, Daily before breakfast  sennosides-docusate sodium, 1 tablet, oral, BID

## 2025-07-11 ENCOUNTER — PHARMACY VISIT (OUTPATIENT)
Dept: PHARMACY | Facility: CLINIC | Age: 75
End: 2025-07-11
Payer: MEDICARE

## 2025-07-11 ENCOUNTER — DOCUMENTATION (OUTPATIENT)
Dept: HOME HEALTH SERVICES | Facility: HOME HEALTH | Age: 75
End: 2025-07-11
Payer: COMMERCIAL

## 2025-07-11 ENCOUNTER — APPOINTMENT (OUTPATIENT)
Dept: RADIOLOGY | Facility: HOSPITAL | Age: 75
DRG: 327 | End: 2025-07-11
Payer: COMMERCIAL

## 2025-07-11 ENCOUNTER — HOME HEALTH ADMISSION (OUTPATIENT)
Dept: HOME HEALTH SERVICES | Facility: HOME HEALTH | Age: 75
End: 2025-07-11
Payer: COMMERCIAL

## 2025-07-11 VITALS
DIASTOLIC BLOOD PRESSURE: 75 MMHG | OXYGEN SATURATION: 94 % | WEIGHT: 212.52 LBS | HEIGHT: 64 IN | SYSTOLIC BLOOD PRESSURE: 160 MMHG | RESPIRATION RATE: 17 BRPM | TEMPERATURE: 97.2 F | BODY MASS INDEX: 36.28 KG/M2 | HEART RATE: 63 BPM

## 2025-07-11 LAB
ANION GAP SERPL CALC-SCNC: 10 MMOL/L (ref 10–20)
BUN SERPL-MCNC: 11 MG/DL (ref 6–23)
CALCIUM SERPL-MCNC: 9 MG/DL (ref 8.6–10.3)
CHLORIDE SERPL-SCNC: 100 MMOL/L (ref 98–107)
CO2 SERPL-SCNC: 32 MMOL/L (ref 21–32)
CREAT SERPL-MCNC: 0.66 MG/DL (ref 0.5–1.05)
EGFRCR SERPLBLD CKD-EPI 2021: >90 ML/MIN/1.73M*2
ERYTHROCYTE [DISTWIDTH] IN BLOOD BY AUTOMATED COUNT: 14 % (ref 11.5–14.5)
GLUCOSE SERPL-MCNC: 96 MG/DL (ref 74–99)
HCT VFR BLD AUTO: 40.8 % (ref 36–46)
HGB BLD-MCNC: 12.4 G/DL (ref 12–16)
MAGNESIUM SERPL-MCNC: 1.94 MG/DL (ref 1.6–2.4)
MCH RBC QN AUTO: 29.1 PG (ref 26–34)
MCHC RBC AUTO-ENTMCNC: 30.4 G/DL (ref 32–36)
MCV RBC AUTO: 96 FL (ref 80–100)
NRBC BLD-RTO: 0 /100 WBCS (ref 0–0)
PLATELET # BLD AUTO: 210 X10*3/UL (ref 150–450)
POTASSIUM SERPL-SCNC: 3.7 MMOL/L (ref 3.5–5.3)
RBC # BLD AUTO: 4.26 X10*6/UL (ref 4–5.2)
SODIUM SERPL-SCNC: 138 MMOL/L (ref 136–145)
WBC # BLD AUTO: 4.7 X10*3/UL (ref 4.4–11.3)

## 2025-07-11 PROCEDURE — 2500000002 HC RX 250 W HCPCS SELF ADMINISTERED DRUGS (ALT 637 FOR MEDICARE OP, ALT 636 FOR OP/ED)

## 2025-07-11 PROCEDURE — 99239 HOSP IP/OBS DSCHRG MGMT >30: CPT | Performed by: NURSE PRACTITIONER

## 2025-07-11 PROCEDURE — RXMED WILLOW AMBULATORY MEDICATION CHARGE

## 2025-07-11 PROCEDURE — 80048 BASIC METABOLIC PNL TOTAL CA: CPT | Performed by: NURSE PRACTITIONER

## 2025-07-11 PROCEDURE — 85027 COMPLETE CBC AUTOMATED: CPT | Performed by: NURSE PRACTITIONER

## 2025-07-11 PROCEDURE — 36415 COLL VENOUS BLD VENIPUNCTURE: CPT | Performed by: NURSE PRACTITIONER

## 2025-07-11 PROCEDURE — 2500000001 HC RX 250 WO HCPCS SELF ADMINISTERED DRUGS (ALT 637 FOR MEDICARE OP)

## 2025-07-11 PROCEDURE — 71045 X-RAY EXAM CHEST 1 VIEW: CPT | Performed by: STUDENT IN AN ORGANIZED HEALTH CARE EDUCATION/TRAINING PROGRAM

## 2025-07-11 PROCEDURE — 2500000004 HC RX 250 GENERAL PHARMACY W/ HCPCS (ALT 636 FOR OP/ED)

## 2025-07-11 PROCEDURE — 99233 SBSQ HOSP IP/OBS HIGH 50: CPT

## 2025-07-11 PROCEDURE — 2500000005 HC RX 250 GENERAL PHARMACY W/O HCPCS: Performed by: NURSE PRACTITIONER

## 2025-07-11 PROCEDURE — 83735 ASSAY OF MAGNESIUM: CPT | Performed by: NURSE PRACTITIONER

## 2025-07-11 PROCEDURE — 71045 X-RAY EXAM CHEST 1 VIEW: CPT

## 2025-07-11 RX ORDER — ONDANSETRON 4 MG/1
4 TABLET, ORALLY DISINTEGRATING ORAL EVERY 8 HOURS PRN
Qty: 20 TABLET | Refills: 0 | Status: SHIPPED | OUTPATIENT
Start: 2025-07-11

## 2025-07-11 RX ORDER — METOPROLOL TARTRATE 25 MG/1
12.5 TABLET, FILM COATED ORAL 2 TIMES DAILY
Qty: 30 TABLET | Refills: 3 | Status: SHIPPED | OUTPATIENT
Start: 2025-07-11

## 2025-07-11 RX ORDER — OXYCODONE HYDROCHLORIDE 5 MG/1
5 TABLET ORAL EVERY 6 HOURS PRN
Qty: 15 TABLET | Refills: 0 | Status: SHIPPED | OUTPATIENT
Start: 2025-07-11 | End: 2025-07-18

## 2025-07-11 RX ORDER — L. ACIDOPHILUS/L.BULGARICUS 1MM CELL
1 TABLET ORAL DAILY
Qty: 8 TABLET | Refills: 0 | Status: SHIPPED | OUTPATIENT
Start: 2025-07-11

## 2025-07-11 RX ORDER — CLINDAMYCIN HYDROCHLORIDE 300 MG/1
300 CAPSULE ORAL 3 TIMES DAILY
Qty: 24 CAPSULE | Refills: 0 | Status: SHIPPED | OUTPATIENT
Start: 2025-07-11 | End: 2025-07-19

## 2025-07-11 RX ORDER — ACETAMINOPHEN 325 MG/1
975 TABLET ORAL EVERY 6 HOURS PRN
Start: 2025-07-11

## 2025-07-11 RX ADMIN — PANTOPRAZOLE SODIUM 40 MG: 40 TABLET, DELAYED RELEASE ORAL at 06:28

## 2025-07-11 RX ADMIN — CLINDAMYCIN PHOSPHATE 600 MG: 600 INJECTION, SOLUTION INTRAVENOUS at 04:17

## 2025-07-11 RX ADMIN — CLINDAMYCIN PHOSPHATE 600 MG: 600 INJECTION, SOLUTION INTRAVENOUS at 13:06

## 2025-07-11 RX ADMIN — LEVOTHYROXINE SODIUM 50 MCG: 0.05 TABLET ORAL at 05:51

## 2025-07-11 RX ADMIN — Medication 2 L/MIN: at 07:56

## 2025-07-11 RX ADMIN — FLUTICASONE PROPIONATE 2 SPRAY: 50 SPRAY, METERED NASAL at 09:40

## 2025-07-11 ASSESSMENT — COGNITIVE AND FUNCTIONAL STATUS - GENERAL
MOBILITY SCORE: 24
DAILY ACTIVITIY SCORE: 24

## 2025-07-11 ASSESSMENT — PAIN - FUNCTIONAL ASSESSMENT
PAIN_FUNCTIONAL_ASSESSMENT: 0-10

## 2025-07-11 ASSESSMENT — PAIN SCALES - GENERAL
PAINLEVEL_OUTOF10: 0 - NO PAIN

## 2025-07-11 NOTE — PROGRESS NOTES
"Dunia Moreau \"Erin\" is a 75 y.o. female on day 1 of admission presenting with Hiatal hernia.  Record reviewed.  TCC met with patient at bedside.  Explained IMM. Patient e-signed.  Copy given to patient.  Care Transitions will continue to follow.  Angela Graham RN BSN, TCC    "

## 2025-07-11 NOTE — PROGRESS NOTES
"                                                                ICU Progress Note       PATIENT NAME: Dunia Moreau  MRN: [id number]    SERVICE DATE:  7/11/2025  SERVICE TIME:  5:24 PM      Dunia Moreau \"Erin\" is a 75 y.o. female on day 1 of admission presenting with Hiatal hernia.      SUBJECTIVE    Patient seen at bedside.  No overnight events.  Patient remains medically stable and in normal sinus rhythm.  Patient was observed to be walking around in the room without difficulty.  Admits to some pain at incision site however no shortness of breath or chest pain.    OBJECTIVE    Vitals:    07/11/25 1300 07/11/25 1400 07/11/25 1500 07/11/25 1600   BP:       Pulse: 74 71 57 63   Resp:       Temp:       TempSrc:       SpO2:       Weight:       Height:          Results from last 7 days   Lab Units 07/11/25  0506   WBC AUTO x10*3/uL 4.7   HEMOGLOBIN g/dL 12.4   HEMATOCRIT % 40.8   PLATELETS AUTO x10*3/uL 210     Results from last 7 days   Lab Units 07/11/25  0506   SODIUM mmol/L 138   POTASSIUM mmol/L 3.7   CHLORIDE mmol/L 100   CO2 mmol/L 32   BUN mg/dL 11   CREATININE mg/dL 0.66   CALCIUM mg/dL 9.0   GLUCOSE mg/dL 96       Scheduled Medications  Scheduled Medications[1]     Physical Exam  Physical Exam  Constitutional:       Appearance: Normal appearance. She is obese.   HENT:      Mouth/Throat:      Mouth: Mucous membranes are moist.      Pharynx: Oropharynx is clear.   Eyes:      Extraocular Movements: Extraocular movements intact.      Conjunctiva/sclera: Conjunctivae normal.   Cardiovascular:      Rate and Rhythm: Normal rate and regular rhythm.   Pulmonary:      Effort: Pulmonary effort is normal.      Breath sounds: No wheezing, rhonchi or rales.   Abdominal:      General: Abdomen is flat.      Palpations: Abdomen is soft.      Tenderness: There is abdominal tenderness. There is no guarding.   Musculoskeletal:      Right lower leg: No edema.      Left lower leg: No edema.   Skin:     General: Skin is warm " and dry.   Neurological:      General: No focal deficit present.      Mental Status: She is alert.            ASSESSMENT & PLAN  76 yo F with PMHx significant for hiatal hernia, GERD, hypothyroidism, and obesity, presenting to the ICU on 7/8/25 for staged robotic hiatal hernia repair performed by Dr. Chapman. She developed subcutaneous emphysema, prompting her to be transferred from regular medicine floor to CICU for close monitoring.      Pt  presenting to [facility] with No chief complaint on file.        Daily Progress:  7/11: patient remained stable overnight. NSR. ICU to sign off at this time.      Neuro:  #Acute post-op pain  -no acute concerns  -pain control: PRN tylenol, roxicodone  -ICU delirium precautions     CV:  #Afib with RVR  -in sinus rhythm  -metoprolol tartrate 12.5 mg BID     Respiratory:   #Acute respiratory insufficiency   -patient on RA     GI:  #Hiatal hernia s/p repair c/b subcutaneous emphysema  -esophogram: no evidence of extravasation of contrast to suggest esophageal injury/leak  -CT chest: extensive subcu neck/chest wall surgical emphysema. Irregular contour of posterior/left mid tracheal wall c/f tracheal air leak.   PLAN:  -ENT following  -CTS as primary      Endo:  #Hypothyroidism  -continue home levothyroxine     Renal:  -no acute issues  -Trend BMP     Hematological:  #Acute post-op anemia  -Trend CBC  -transfuse for Hgb < 7     ID:  -clindamycin for tracheal leak      Vent/O2:   RA  Lines/Devices: PIV  Drips: --  ATBx: clindamycin  Fluids: --  Diet: full liquid  PPX: PPI  DVT PPX: lovenox  Code status: full code  Dispo: home    Inessa Mcgovern DO PGY-1 Internal Medicine  This is a preliminary note, please await attending attestation for final A/P.   July 11, 2025 5:24 PM              [1] clindamycin, 600 mg, intravenous, q8h  enoxaparin, 40 mg, subcutaneous, q24h  fluticasone, 2 spray, Each Nostril, Daily  levothyroxine, 50 mcg, oral, Daily  metoprolol tartrate, 12.5 mg, oral, BID  oxygen, ,  inhalation, Continuous - Inhalation  pantoprazole, 40 mg, oral, Daily before breakfast  sennosides-docusate sodium, 1 tablet, oral, BID

## 2025-07-11 NOTE — DISCHARGE SUMMARY
"Discharge Diagnosis  Hiatal hernia    Test Results Pending At Discharge  Pending Labs       No current pending labs.            Hospital Course   History of Present Illness: Dunia Moreau \"Erin\" is a 75 y.o. female with a PMH significant for Hiatal Hernia with GERD, hypothyroidism, and obesity (BMI 35) who presents to [facility] on 7/8/2025 for a staged robotic hiatal hernia repair with thoracic surgeon, Dr. Jairon Chapman.     This patient presented to her gastroenterologist with worsening symptoms of reflux and dysphagia.  She notes dysphagia to solid foods such as bread rice and chicken.  She has been treating this with Pepcid without improvement.  Her gastroenterologist (Enrique) recommended an EGD, which was performed on 1/24/2024 and showed a small type II hiatal hernia with the GE junction at 38 cm from the incisors.  Biopsies of the GE junction showed no evidence of Johnston's esophagus.  Her gastro doctor also recommended twice daily PPI treatment, which has helped her symptoms, but they remain significant.      Daily Events     7/9/25: This morning, the patient was noted to be with slightly worse subcutaneous emphysema with facial swelling slightly worse. However, she's without respiratory concerns; remains on RA and is hemodynamically stable.   However, given her subcutaneous air, ENT is consulted for a more urgent upper airway endoscopy to rule out an unlikely tracheal injury.      Plan to keep the patient on a strict NPO. Will move to ICU for closer observation and given this environment will be better equip for invasive bedside procedures.   - Current O2 Requirements: RA     7/10/25: Overnight went into A fib RVR in the 130s. ICU was consulted- patient received IV lopressor and IV fluids. This AM continues to be in A fib- started beta blocker and consult cardiology to establish care. Subcutaneous air and crepitus noted to face; however, able to see today and open " her eyes. EGD completed- no leak; diet advanced to clears. CXR with ?pneumomediastinum- obtained a chest CT which revealed a posterior tracheal injury concerning for the site of air leak > plan to start prophylactic antibiotics and continue to monitor.    - Current O2 requirements: 2L NC    7/11/25: Patient seen and assessed this morning; clinically, she has significantly improved with her subcutaneous air near resolving in her face and notable improvement in her CXR. She has been weaned to RA, and has been tolerating a clear liquid diet with pills that was subsequently advanced to full liquid.     Patient has remained hemodynamically stable without recurrent of her previous atrial arrhythmia. Labs are without sings of system infection, and she has no complaints/symptoms from a GI or respiratory perspective.     Given her satisfactory recovery, we feel that it is safe to discharge the patent to home. Full liquid diet education was provided to the patient, along with s/sx to seek medical attention.     - Current O2 Requirements: RA        Assessment & Plan    Hiatal Hernia    Posterior tracheal injury    Subcutaneous air  - S/p robotic hiatal hernia repair on 7/8/25 with Thoracic Surgeon, Dr. Jairon Chapman   ---> Post operative with subcutaneous air              -Upper airway endoscopy 7/9 with mild to moderate glottic edema, ecchymosis of left posterior glottis, small area of ecchymosis vs hematoma of left lateral subglottic wall              -Esophagram 7/10 without evidence of leak  - Continue on full liquid diet until follow up with thoracic surgery  --> Diet education provided   - Continue on PPI   - 10 day course of clindamycin for prophylaxis     Post-op Imaging   -CT chest 7/10:  Apparent irregular contour of the posterior/left posterior mid tracheal wall which is highly concerning for tracheal injury/site of air leak  -Currently no signs of mediastinitis  -Non operative management for now : Clindamycin  600mg q8 hour due to PCN and amoxicillin allergies  -If develops signs of mediastinitis (fever, uncontrolled subcutaneous air, hemodynamic instability) will consider operative management       Acute Respiratory Insufficiency   - As anticipated in the immediate post operative period   - Current O2 Requirements: RA  - IS/Deep Breathing Exercises,       Atrial Fibrillation, new onset  -New onset a fib RVR overnight on 7/10  -[id number] score: 3  -Self converted mid morning 7/10  - Continue on metoprolol tartrate 12.5mg BID    Rhythm      7/11: Sinus Rhythm         Acute Post-Op Pain  - Scheduled acetaminophen   - PRN oxycodone 5mg (OARRS verified)         Hypothyroidism   - Home Medication: Levothyroxine 50mg every day  - Resume home PO synthroid       Right apical ground glass density    Right renal cyst  -Incidental findings on 7/10 chest CT  -Complex right renal cyst measuring 7.4 cm  -Radiology recommend outpatient renal MRI with IV contrast  -1cm ground glass density nodule in right apical              -Recommend outpatient CT chest at 6-12 months   -Lung nodule referral sent      Disposition  - Home with home healthcare      Above patient and plan discussed with thoracic surgeon, Dr. Diana Chapman.     Total Discharge Time: > 30 minutes     Visit Vitals  /75   Pulse 74   Temp 36 °C (96.8 °F) (Temporal)   Resp 17     Vitals:    07/11/25 1051   Weight: 96.4 kg (212 lb 8.4 oz)       Immunization History   Administered Date(s) Administered    Flu vaccine, trivalent, preservative free, HIGH-DOSE, age 65y+ (Fluzone) 10/21/2019, 10/30/2020    Influenza, Unspecified 10/19/2018    Pfizer Purple Cap SARS-CoV-2 03/17/2021, 04/09/2021    Pneumococcal conjugate vaccine, 13-valent (PREVNAR 13) 10/19/2018    Pneumococcal polysaccharide vaccine, 23-valent, age 2 years and older (PNEUMOVAX 23) 10/23/2019    SARS-CoV-2, Unspecified 01/28/2022    Tdap vaccine, age 7 year and older (BOOSTRIX, ADACEL) 10/09/2008,  10/19/2018    Zoster, Unspecified 07/19/2019, 10/21/2019       Results        Pertinent Physical Exam At Time of Discharge  Physical Exam  Constitutional:       General: She is not in acute distress.     Appearance: Normal appearance. She is not ill-appearing.   HENT:      Head: Normocephalic and atraumatic.      Nose: Nose normal.      Mouth/Throat:      Mouth: Mucous membranes are moist.      Pharynx: Oropharynx is clear.   Eyes:      Extraocular Movements: Extraocular movements intact.      Conjunctiva/sclera: Conjunctivae normal.      Pupils: Pupils are equal, round, and reactive to light.   Cardiovascular:      Rate and Rhythm: Normal rate and regular rhythm.      Pulses: Normal pulses.      Heart sounds: Normal heart sounds, S1 normal and S2 normal. No murmur heard.     No systolic murmur is present.      No friction rub. No gallop.   Pulmonary:      Effort: Pulmonary effort is normal.      Breath sounds: Normal breath sounds.   Abdominal:      General: Abdomen is flat. Bowel sounds are normal. There is no distension.      Palpations: Abdomen is soft.   Musculoskeletal:         General: Normal range of motion.      Cervical back: Normal range of motion and neck supple.      Right lower leg: No edema.      Left lower leg: No edema.   Skin:     General: Skin is warm and dry.      Capillary Refill: Capillary refill takes less than 2 seconds.      Findings: No lesion.      Nails: There is no clubbing.      Comments: Abdominal Incisions Well approximated, C/D/I    Neurological:      General: No focal deficit present.      Mental Status: She is alert and oriented to person, place, and time. Mental status is at baseline.      Cranial Nerves: Cranial nerves 2-12 are intact.      Sensory: Sensation is intact.      Motor: Motor function is intact.   Psychiatric:         Attention and Perception: Attention and perception normal.         Mood and Affect: Mood normal.         Speech: Speech normal.         Behavior:  Behavior normal.         Thought Content: Thought content normal.         Cognition and Memory: Cognition and memory normal.         Judgment: Judgment normal.         Home Medications     Medication List      START taking these medications     acetaminophen 325 mg tablet; Commonly known as: Tylenol; Take 3 tablets   (975 mg) by mouth every 6 hours if needed for mild pain (1 - 3).   clindamycin 300 mg capsule; Commonly known as: Cleocin; Take 1 capsule   (300 mg) by mouth 3 times a day for 8 days.   lactobacillus acidophilus tablet tablet; Take 1 tablet by mouth once   daily.   metoprolol tartrate 25 mg tablet; Commonly known as: Lopressor; Take 0.5   tablets (12.5 mg) by mouth 2 times a day.   ondansetron ODT 4 mg disintegrating tablet; Commonly known as:   Zofran-ODT; Dissolve 1 tablet (4 mg) in the mouth every 8 hours if needed   for nausea or vomiting.   oxyCODONE 5 mg immediate release tablet; Commonly known as: Roxicodone;   Take 1 tablet (5 mg) by mouth every 6 hours if needed for severe pain (7 -   10) for up to 7 days.     CONTINUE taking these medications     albuterol 90 mcg/actuation inhaler; Inhale 2 puffs every 4 hours if   needed for wheezing.   CALCIUM CITRATE-VITAMIN D3 ORAL   co-enzyme Q-10 30 mg capsule   fluticasone 50 mcg/actuation nasal spray; Commonly known as: Flonase;   Administer 2 sprays into each nostril once daily.   levothyroxine 50 mcg tablet; Commonly known as: Synthroid, Levoxyl; Take   1 tablet (50 mcg) by mouth once daily.   loratadine 10 mg tablet; Commonly known as: Claritin   Magnesium (oxide/AA chelate) 300 mg capsule; Generic drug: magnesium   oxide-Mg AA chelate   multivitamin tablet   pantoprazole 40 mg EC tablet; Commonly known as: ProtoNix; Take 1 tablet   (40 mg) by mouth 2 times a day. Do not crush, chew, or split.   PARoxetine 30 mg tablet; Commonly known as: Paxil; Take 1 tablet (30 mg)   by mouth once daily.   Vitamins B Complex capsule; Generic drug: b complex  vitamins       Outpatient Follow-Up  Future Appointments   Date Time Provider Department Center   7/23/2025 12:15 PM Jairon Chapman MD [id number] [place]   8/27/2025  9:00 AM CAR [id number] MAMMO [id number] CAR [place]   9/3/2025 10:40 AM PALOMA Christie [id number] None   9/25/2025  9:30 AM Russell Lowe MD [id number] [facility]       PALOMA Turner, DNP

## 2025-07-11 NOTE — CARE PLAN
The clinical goals for the shift include pt will remain hemodynamically stable      Problem: Pain - Adult  Goal: Verbalizes/displays adequate comfort level or baseline comfort level  Outcome: Progressing     Problem: Safety - Adult  Goal: Free from fall injury  Outcome: Progressing     Problem: Discharge Planning  Goal: Discharge to home or other facility with appropriate resources  Outcome: Progressing     Problem: Chronic Conditions and Co-morbidities  Goal: Patient's chronic conditions and co-morbidity symptoms are monitored and maintained or improved  Outcome: Progressing     Problem: Nutrition  Goal: Nutrient intake appropriate for maintaining nutritional needs  Outcome: Progressing     Problem: Skin  Goal: Decreased wound size/increased tissue granulation at next dressing change  Outcome: Progressing  Goal: Promote/optimize nutrition  Outcome: Progressing

## 2025-07-11 NOTE — DISCHARGE INSTRUCTIONS
Diet After Nissen Fundoplication Surgery / Hiatal Hernia Repair     This diet information is for patients who have recently had Nissen fundoplication surgery to correct reflux disease or to repair various types of hernias, such as hiatal hernia and intrathoracic stomach. This diet may also be used for other gastrointestinal surgeries, such as Heller myotomy and repair of achalasia. The diet will help control diarrhea, excess gas and swallowing problems, which may occur after this type of surgery.  Keeping Your Stomach from Stretching  Eat small, frequent meals (six to eight per day). This will help you consume the majority of the nutrients you need without causing your stomach to feel full or distended.   Drinking large amounts of fluids with meals can stretch your stomach. You may drink fluids between meals as often as you like, but limit fluids to 1/2 cup (4 fluid ounces) with meals and one cup (8 fluid ounces) with snacks.   Sit upright while eating and stay upright for 30 minutes after each meal. Gravity can help food move through your digestive tract. Do not lie down after eating. Sit upright for 2 hours after your last meal or snack of the day.   Eat very slowly. Take your time when eating.   Take small bites and chew your food well to help aid in swallowing and digestion.   Avoid crusty breads and sticky, gummy foods, such as bananas, fresh doughy breads, rolls and doughnuts. These types of foods become sticky and difficult to swallow.   Toasted breads tend to be better tolerated.   Lastly, if you eat sweets, consume them at the end of your meal to avoid a group of symptoms referred to as “dumping syndrome”. This describes the rapid emptying of foods from the stomach to the small intestine. Sweetened beverages, candy and desserts move more rapidly and dump quickly into the intestines. This can cause symptoms of nausea, weakness, cold sweats, cramps, diarrhea and dizzy spells.   Avoiding Gas  Avoid drinking  through a straw. Do not chew gum or tobacco. These actions cause you to swallow air, which produces excess gas in your stomach. Chew with your mouth closed.   Avoid any foods that cause stomach gas and distention. These foods include corn, dried beans, peas, lentils, onions, broccoli, cauliflower and any food from the cabbage family.   Avoid carbonated drinks, alcohol, citrus and tomato products.   When will I be able to eat a soft diet?  After Nissen fundoplication surgery, your diet will be advanced slowly by your surgeon. Generally, you will be on a clear liquid diet for the first few meals. Then you will advance to the full liquid diet for a meal or two and eventually to a Nissen soft diet.  Please be aware that each patient's tolerance to food is different. Your doctor will advance your diet depending on how well you progress after surgery.    Clear Liquid Diet   The first diet after surgery is the clear liquid diet.  It includes the following liquids:  Apple juice   Cranberry juice   Grape juice   Chicken broth   Beef broth   Flavored gelatin (Jell-O®)   Decaf tea and coffee   Caffeinated beverages are permitted based on tolerance   Popsicles   Italian ice   Carbonated drinks (sodas) are not allowed for the first six to eight weeks after surgery. After this time you can try them again in small amounts.    Full Liquid Diet  The full liquid diet contains anything on the clear liquid diet, plus:  Milk, soy, rice and almond (no chocolate)   Cream of wheat, cream of rice, grits   Strained creamed soups (no tomato or broccoli)   Vanilla and strawberry-flavored ice cream   Sherbet   Blended, custard styled or whipped yogurt (plain or vanilla only)   Vanilla and butterscotch pudding (no chocolate or coconut)   Nutritional drinks including Ensure®, Boost®, [place] Instant Breakfast® (no chocolate-flavored)     Note: Dairy products, such as milk, ice cream and pudding, may cause diarrhea in some people just after  surgery. You may need to avoid milk products. If so, substitute them with lactose-free beverages, such as soy, rice, Lactaid® or almond milks.    Soft Diet  Food Category Foods to Choose Foods to Avoid   Beverages Milk, such as, whole, 2%, 1%, non-fat, or skim, soy, rice, almond   Caffeinated and decaf tea and coffee   Powdered drink mixes (in moderation)   Non-citrus juices (apple, grape, cranberry or blends of these)   Fruit nectars   Nutritional drinks including Boost®, Ensure®, [place] Instant Breakfast® Chocolate milk, cocoa or other chocolate-flavored drinks   Carbonated drinks   Alcohol   Citrus juices like orange, grapefruit, lemon and lime   Breads Pancakes, Vietnamese toast and waffles   Crackers (saltine, butter, soda, veena, Goldfish® and Cheese Nips®)   Toasted bread Untoasted bread, bagels, [place] and hard rolls, English muffins   Crackers with nuts, seeds, fresh or dried fruit, coconut, or highly seasoned, such as garlic or onion-flavored   Sweet rolls, coffee cake or doughnuts   Cereals Well cooked cereals, such as oatmeal (plain or flavored)   Cold cereal (Cornflakes®, Rice Krispies®, Cheerios®, Special K® plain, Rice Chex® and puffed rice) Very coarse cereal, such as bran, shredded wheat   Any cereal with fresh or dried fruit, coconut, seeds or nuts   Desserts  Eat in moderation and do not eat desserts or sweets by themselves. Plain cakes, cookies and cream-filled pies   Vanilla and butterscotch pudding or custard   Ice cream, ice milk, frozen yogurt and sherbet   Gelatin made from allowed foods   Fruit ices and popsicles Desserts containing chocolate, coconut, nuts, seeds, fresh or dried fruit, peppermint or spearmint   Eggs  Poached, hard boiled or scrambled Fried eggs and highly seasoned eggs (deviled eggs)   Fats  Eat in moderation. Butter and margarine   Mayonnaise and vegetable oils   Mildly seasoned cream sauces and gravies   Plain cream cheese   Sour cream Highly seasoned salad dressings,  cream sauces and gravies   Tadeo, tadeo fat, ham fat, lard and salt pork   Fried foods   Nuts   Fruits Fruit juice   Any canned or cooked fruit except those listed in the AVOID column ALL fresh fruits, such as citrus, bananas and pineapple   Canned pineapple   Dried fruits, such as raisins, berries   Fruits with seeds, such as berries, kiwi and figs   Meat, Fish, Poultry, and Dairy Products Meats may be ground, minced or chopped to ease swallowing and digestion   Tender, well cooked and moist cuts of beef, chicken, turkey and pork   Veal and lamb   Flaky, cooked fish   Canned tuna   Cottage and ricotta cheeses   Mild cheese, such as American, brick, mozzarella and baby Swiss   Creamy peanut butter   Plain custard or blended fruit yogurt   Moist casseroles, such as macaroni & cheese, tuna noodle   Grilled or toasted cheese sandwich Tough meats with a lot of gristle   Fried, highly seasoned, smoked and fatty meat, fish or poultry, such as frankfurters, luncheon meats, sausage, tadeo, spare ribs, beef brisket, sardines, anchovies, duck and goose   Chili and other entrees made with pepper or chili pepper   Shellfish   Strongly flavored cheeses, such as sharp cheese, extra sharp cheddar, cheese containing peppers or other seasonings   Crunchy peanut butter   Any yogurt with nuts, seeds, coconut, strawberries or raspberries   Potatoes and Starches Peeled, mashed or boiled white or sweet potatoes   Oven-baked potatoes without skin   Well cooked white rice, enriched noodles, barley, spaghetti, macaroni and other pastas Fried potatoes, potato skins and potato chips   Hard and soft taco shells   Fried, brown or wild rice   Soups Mildly flavored meat stocks   Cream soups made from allowed foods Highly seasoned soups and tomato based soups, cream soups made with gas producing vegetables, such as broccoli, cauliflower, onion, etc.   Sweets and Snacks  Use in moderation and do not eat large amounts of sweets by themselves. Syrup,  honey, jelly and seedless jam   Plain hard candies and plain candies made with allowed ingredients   Molasses   Marshmallows   Other candy made from allowed ingredients   Thin pretzels Jam, marmalade and preserves   Chocolate in any form   Any candy containing nuts, coconut, seeds, peppermint, spearmint or dried or fresh fruit   Popcorn, potato chips, tortilla chips   Soft or hard thick pretzels, such as sourdough   Vegetables Well cooked soft vegetables without seeds or skins, such as asparagus tips, beets, carrots, green and wax beans, chopped spinach, tender canned baby peas, squash and pumpkin Raw vegetables, tomatoes, tomato juice, tomato sauce and V-8® juice   Gas producing vegetables, such as broccoli, Brussel sprouts, cabbage, cauliflower, onions, corn, cucumber, green peppers, rutabagas, turnips, radishes and sauerkraut   Dried beans, peas and lentils   Miscellaneous Salt and spices in moderation   Mustard and vinegar in moderation Fried or highly seasoned foods   Coconut and seeds   Pickles and olives   Chili sauces, ketchup, barbecue sauce, horseradish, black pepper, chili powder and onion and garlic seasonings   Any other strongly flavored seasoning, condiment, spice or herb not tolerated   Any food not tolerated     Sample Menu  Breakfast ½ cup canned fruit (non-citrus)   ½ to ¾ cup cereal   1 small pancake   1 tsp. margarine   1 tsp. jelly   ½ cup 2% milk   1 tsp. sugar   Mid-Morning Snack 2 veena crackers   1 T creamy peanut butter   1 tsp. jelly   1 cup tea   Lunch ½ cup tuna salad (no raw vegetables)   3 to 4 saltine crackers   ½ cup canned peaches   ½ cup fruit juice (non-citrus)   1 tsp. mayonnaise    Mid-Afternoon Snack 4 saltine crackers   1 T cream cheese   1 cup 2% milk   Dinner 3 oz. roasted chicken (finely ground) with sauce   ½ cup cooked white rice   ¼ cup cooked carrots   ½ cup canned pears   1 tsp. margarine   ½ cup tea   1 tsp. sugar   Evening Snack ¼ cup cottage cheese   ½ cup  applesauce   ½ cup 2% milk   Please note: You will need extra fluids throughout the day to meet your fluid needs.      [email or web address]

## 2025-07-11 NOTE — CONSULTS
"Nutrition Initial Assessment:   Nutrition Assessment    Reason for Assessment: Dietitian discretion (NPO/CLD x4 days; MST=0)    Patient is a 75 y.o. female presenting with hiatal hernia; s/p Hernia repair with fundoplication 7/8/25    PmHx: hiatal hernia, hypothyroidism, obesity, anxiety, asthma, arthritis, depression, GERD    Nutrition History:  Energy Intake: Poor < 50 %  Pain affecting nutrition status: N/A  Food and Nutrient History: Pt laying in bed at visit.  Pt denies nausea, moving bowels well. Denies unintentional weight loss. POD#3 for hiatal hernia repair/fundoplication.  Per review of chart, possible tracheal tear on CT as pt developed sucutaneous emphysema post op.  Pt was agreeable to Ensure clear TID until diet advanced  Vitamin/Herbal Supplement Use: B complex, Calcium plus D, coenzyme Q, MVI       Anthropometrics:  Height: 162.6 cm (5' 4.02\")   Weight: 96.4 kg (212 lb 8.4 oz)   BMI (Calculated): 36.46  IBW/kg (Dietitian Calculated): 54.5 kg  Percent of IBW: 177 %      Weight History:     Weight Change %:  Weight History / % Weight Change: 7/8/25 94.8kg, 6/26/25 94.8kg, 2/20/25 95.5kg, 1/23/25 96.2kg, 7/11/24 96.6kg  Significant Weight Loss: No    Nutrition Focused Physical Exam Findings:    Subcutaneous Fat Loss:   Orbital Fat Pads: Well nourished (slightly bulging fat pads)  Buccal Fat Pads: Well nourished (full, rounded cheeks)  Triceps: Well nourished (ample fat tissue)  Ribs: Well nourished (chest is full, ribs do not show, slight to no protrusion of the iliac crest)  Muscle Wasting:  Temporalis: Well nourished (well-defined muscle)  Pectoralis (Clavicular Region): Well nourished (clavicle not visible)  Deltoid/Trapezius: Well nourished (rounded appearance at arm, shoulder, neck)  Interosseous: Well nourished (muscle bulges)  Trapezius/Infraspinatus/Supraspinatus (Scapular Region): Well nourished (bones not prominent, muscle taut)  Quadriceps: Well nourished (well developed, well " rounded)  Gastrocnemius: Well nourished (well developed bulbous muscle)  Edema:  Edema: none  Physical Findings:  Skin: Positive (abdominal surgical incisions x5)    Nutrition Significant Labs:  CBC Trend:   Results from last 7 days   Lab Units 07/11/25  0506 07/10/25  0504 07/09/25  0639 07/08/25  1443   WBC AUTO x10*3/uL 4.7 6.1 9.7 9.7   RBC AUTO x10*6/uL 4.26 3.80* 4.01 4.39   HEMOGLOBIN g/dL 12.4 11.2* 11.7* 13.1   HEMATOCRIT % 40.8 36.1 38.2 41.3   MCV fL 96 95 95 94   PLATELETS AUTO x10*3/uL 210 176 239 237    , BMP Trend:   Results from last 7 days   Lab Units 07/11/25  0506 07/10/25  0504 07/09/25  0639 07/08/25  1443   GLUCOSE mg/dL 96 98 113* 139*   CALCIUM mg/dL 9.0 8.6 8.9 8.7   SODIUM mmol/L 138 137 139 137   POTASSIUM mmol/L 3.7 4.2 4.9 4.3   CO2 mmol/L 32 31 31 29   CHLORIDE mmol/L 100 101 101 101   BUN mg/dL 11 16 18 21   CREATININE mg/dL 0.66 0.73 0.87 0.93        Nutrition Specific Medications:  REVIEWED     I/O:   Last BM Date: 07/11/25; Stool Appearance: Loose (07/11/25 0500)    Dietary Orders (From admission, onward)       Start     Ordered    07/11/25 0807  Oral nutritional supplements  Until discontinued        Question Answer Comment   Deliver with Breakfast    Deliver with Lunch    Deliver with Dinner    Select supplement: Ensure Clear        07/11/25 0807    07/10/25 0930  Adult diet Clear Liquid  Diet effective now        Question:  Diet type  Answer:  Clear Liquid    07/10/25 0929    07/08/25 1637  May Participate in Room Service  ( ROOM SERVICE MAY PARTICIPATE)  Once        Question:  .  Answer:  Yes    07/08/25 1636                     Estimated Needs:      Method for Estimating Needs: 1470-1635kcals (27-30kcals/kg IBW)     Method for Estimating 24 Hour Protein Needs: 60-70g (1.1-1.3g/kg IBW)     Method for Estimating 24 Hour Fluid Needs: 1 mL/kcal or as per MD  Patient on Order Fluid Restriction: No        Nutrition Diagnosis   Malnutrition Diagnosis  Patient has Malnutrition  Diagnosis: No    Nutrition Diagnosis  Patient has Nutrition Diagnosis: Yes  Diagnosis Status (1): New  Nutrition Diagnosis 1: Inadequate protein energy intake  Related to (1): s/p hernia repair, fundoplication  As Evidenced by (1): NPO/CLD x4 days       Nutrition Interventions/Recommendations   Nutrition prescription for oral nutrition    Nutrition Recommendations:  Individualized Nutrition Prescription Provided for : CLD as ordered, advance as tolerated.  Ensure Clear TID until diet advanced    Nutrition Interventions/Goals:   Meals and Snacks: Fluid-modified diet  Goal: consume 3 meals per day; advance diet as able  Medical Food Supplement: Commercial beverage medical food supplement therapy  Goal: consume Ensure Clears TID (for an additional 240 kcals, 9 gm protein each)  Vitamin and Mineral Supplement Therapy: Calcium supplement therapy, Vitamin D supplement therapy, Multivitamin multimineral supplement therapy, Other (Comment) (B complex)    Education Documentation  Will provide Nissen fundoplication diet prior to discharge      Nutrition Monitoring and Evaluation   Intake / Amount of food: Consumes at least 75% or more of meals/snacks/supplements, Meets > 75% estimated energy needs  Additional Plans: monitor ability to advance diet    Body Weight: Body weight - Maintain stable weight    Electrolyte and Renal Panel: Electrolytes within normal limits    Skin Finding: Promote intact skin - Promote skin integrity    Goal Status: New goal(s) identified    Time Spent (min): 45 minutes

## 2025-07-11 NOTE — HH CARE COORDINATION
Home Care received a Referral for Nursing, Physical Therapy, and Occupational Therapy. We have processed the referral for a Start of Care on 7.12-7.13.25.     If you have any questions or concerns, please feel free to contact us at [phone]. Follow the prompts, enter your five digit zip code, and you will be directed to your care team on [facility] 1.

## 2025-07-12 ENCOUNTER — HOME CARE VISIT (OUTPATIENT)
Dept: HOME HEALTH SERVICES | Facility: HOME HEALTH | Age: 75
End: 2025-07-12
Payer: COMMERCIAL

## 2025-07-12 VITALS
SYSTOLIC BLOOD PRESSURE: 140 MMHG | DIASTOLIC BLOOD PRESSURE: 60 MMHG | HEIGHT: 63 IN | TEMPERATURE: 98.7 F | RESPIRATION RATE: 18 BRPM | WEIGHT: 209 LBS | BODY MASS INDEX: 37.03 KG/M2 | OXYGEN SATURATION: 95 % | HEART RATE: 64 BPM

## 2025-07-12 PROCEDURE — G0299 HHS/HOSPICE OF RN EA 15 MIN: HCPCS

## 2025-07-12 RX ORDER — ACETAMINOPHEN, GUAIFENESIN, AND PHENYLEPHRINE HYDROCHLORIDE 325; 200; 5 MG/1; MG/1; MG/1
2 TABLET, COATED ORAL EVERY 4 HOURS PRN
Refills: 0 | OUTPATIENT
Start: 2025-07-12

## 2025-07-12 ASSESSMENT — ACTIVITIES OF DAILY LIVING (ADL)
TRANSPORTATION ASSESSED: 1
OASIS_M1830: 02
PHYSICAL TRANSFERS ASSESSED: 1
HOUSEKEEPING ASSESSED: 1
TRANSPORTATION: DEPENDENT
AMBULATION ASSISTANCE: 1
PREPARING MEALS: DEPENDENT
CURRENT_FUNCTION: STAND BY ASSIST
LIGHT HOUSEKEEPING: DEPENDENT
AMBULATION ASSISTANCE: STAND BY ASSIST
ENTERING_EXITING_HOME: STAND BY ASSIST

## 2025-07-12 ASSESSMENT — ENCOUNTER SYMPTOMS
PAIN LOCATION - PAIN SEVERITY: 5/10
CHANGE IN APPETITE: UNCHANGED
PAIN LOCATION - EXACERBATING FACTORS: ACTIVITY
LAST BOWEL MOVEMENT: 67398
STOOL FREQUENCY: DAILY
PAIN SEVERITY GOAL: 0/10
PAIN LOCATION - PAIN QUALITY: ACHY AND TENDER
APPETITE LEVEL: FAIR
PAIN LOCATION: HEAD
LOWEST PAIN SEVERITY IN PAST 24 HOURS: 5/10
PAIN LOCATION - EXACERBATING FACTORS: ALLERGIES
PAIN: 1
PAIN LOCATION - RELIEVING FACTORS: REST AND MEDICATIONS
PAIN LOCATION - PAIN SEVERITY: 5/10
PAIN LOCATION - PAIN FREQUENCY: CONSTANT
SUBJECTIVE PAIN PROGRESSION: GRADUALLY IMPROVING
BOWEL PATTERN NORMAL: 1
PERSON REPORTING PAIN: PATIENT
PAIN LOCATION: ABDOMEN
PAIN LOCATION - RELIEVING FACTORS: REST AND MEDICATIONS
PAIN LOCATION - PAIN QUALITY: ACHY
PAIN LOCATION - PAIN FREQUENCY: CONSTANT
HIGHEST PAIN SEVERITY IN PAST 24 HOURS: 5/10
MUSCLE WEAKNESS: 1

## 2025-07-14 ENCOUNTER — HOME CARE VISIT (OUTPATIENT)
Dept: HOME HEALTH SERVICES | Facility: HOME HEALTH | Age: 75
End: 2025-07-14
Payer: COMMERCIAL

## 2025-07-14 ENCOUNTER — PATIENT OUTREACH (OUTPATIENT)
Dept: PRIMARY CARE | Facility: CLINIC | Age: 75
End: 2025-07-14
Payer: COMMERCIAL

## 2025-07-14 ENCOUNTER — TELEPHONE (OUTPATIENT)
Dept: PRIMARY CARE | Facility: CLINIC | Age: 75
End: 2025-07-14
Payer: COMMERCIAL

## 2025-07-14 PROCEDURE — G0152 HHCP-SERV OF OT,EA 15 MIN: HCPCS

## 2025-07-14 PROCEDURE — G0151 HHCP-SERV OF PT,EA 15 MIN: HCPCS

## 2025-07-14 SDOH — HEALTH STABILITY: PHYSICAL HEALTH: EXERCISE TYPE: SKILLED THERAPEUTIC EXERCISES

## 2025-07-14 ASSESSMENT — ENCOUNTER SYMPTOMS
DENIES PAIN: 1
DENIES PAIN: 1
PERSON REPORTING PAIN: PATIENT
PERSON REPORTING PAIN: PATIENT

## 2025-07-14 ASSESSMENT — ACTIVITIES OF DAILY LIVING (ADL)
BATHING ASSESSED: 1
BATHING_CURRENT_FUNCTION: INDEPENDENT
DRESSING_LB_CURRENT_FUNCTION: INDEPENDENT
AMBULATION_DISTANCE/DURATION_TOLERATED: 80 FT
AMBULATION ASSISTANCE ON FLAT SURFACES: 1
PHYSICAL_TRANSFERS_DEVICES: BODY SUPPORT
PHYSICAL TRANSFERS ASSESSED: 1
CURRENT_FUNCTION: MAXIMUM ASSIST
AMBULATION ASSISTANCE: STAND BY ASSIST
AMBULATION ASSISTANCE: 1

## 2025-07-14 NOTE — HOME HEALTH
GOALS: PATIENT WILL DEMONSTRATE IMPROVED ENDURANCE, STRENGTH, MOBILITY, AND BALANCE.  SUBJECTIVE: THE PATIENT REPORTED NO PAIN. PATIENT REPORTED DIFFICULTY WITH AMBULATION.  PRIOR LEVEL OF FUNCTION: INDEPENDENT.  OBJECTIVE: MANUAL MUSCLE TESTING WAS PERFORMED TO DETERMINE BOTH CORE AND LE STRENGTH. PATIENT DEMONSTRATED 2+/5 MUSCLE STRENGTH TO CORE AND KAYLIE LE'S. BALANCE TESTING WAS PERFORMED WHICH SHOWED PATIENT TO BE A HIGH FALL RISK. PATIENT WAS ABLE TO WALK 40 FEET WITH NO ASSISTIVE DEVICE, BUT DEMONSTRATED DIFFICULTY WITH TRANSFERS FROM STS AND GAIT ABNORMALITIES ALONG WITH BALANCE IMPAIRMENT.  THERAPEUTIC ACTIVITIES: MMT, MOBILITY, AND GAIT ASSESSMENT COMPLETED  ASSESSMENT: DEMONSTRATED GROSSLY REDUCED STRENGTH AND ENDURANCE SECONDARY TO ASSOCIATED COMORBID CONDITIONS. PATIENT, PRESENTLY, IS A FALL RISK, CURRENTLY AMBULATING WITH NO AD. FALL PRECAUTIONS DISCUSSED.  PLAN: CONSIDER COMORBID FACTORS WHEN IMPLEMENTING POC. CONTINUE WITH GENERAL ENDURANCE AND STRENGTH TRAINING UE'S AND LE'S, GAIT TRAINING ACTIVITIES, BALANCE AND PROPRIOCEPTIVE TRAINING, AND FALL PREVENTION STRATEGIES AS PER PT POC. PROGRESS AS TOLERATED.  PLAN FOR NEXT VISIT: SKILLED EXERCISES, ENDURANCE TRAINING, TRANSFER TRAINING.  REHAB POTENTIALS FOR STATED GOALS: GOOD  PATIENT STATED GOAL: ABLE TO WALK WITHOUT DIFFICULTY.

## 2025-07-14 NOTE — PROGRESS NOTES
"Discharge Facility: [facility]  Discharge Diagnosis: Hiatal Hernia  Admission Date: 7/9/25  Discharge Date: 7/11/25    PCP Appointment Date: Patient declined, wants to follow up with specialty only  Specialist Appointment Date: 7/23/25 Thoracic surgery  Hospital Encounter and Summary Linked: Yes Admission (Discharged) with Jairon Chapman MD (07/08/2025)   See discharge assessment below for further details      Wrap Up  Wrap Up Additional Comments: I spoke to the patient. She states she is feeling much better since getting out of the hospital. She denies experiencing any chest pain or shortness of breath. We reviewed her new medications and any changes made. She said she was not taking the metoprolol because someone came in at discharge and said she only needed to take it if her heart was \"fluttering\". I messaged the attending who discharged her just to clarify. I emphasized the importance of follow-up appointments with both her primary care physician and specialists to assess treatment response. At this time, she would like to follow up with just the specialist. The patient is aware of my availability for non-emergency concerns and has been provided with my contact information. (7/14/2025  3:00 PM)    Engagement  Call Start Time: 1453 (7/14/2025  3:00 PM)    Medications  Medications reviewed with patient/caregiver?: Yes (7/14/2025  3:00 PM)  Is the patient having any side effects they believe may be caused by any medication additions or changes?: No (7/14/2025  3:00 PM)  Does the patient have all medications ordered at discharge?: Yes (7/14/2025  3:00 PM)  Care Management Interventions: No intervention needed (7/14/2025  3:00 PM)  Prescription Comments: new prescriptions: acetaminophen, clindamycin, lactobacillus acidophilus, metoprolol tartrate, ondansetron, oxycodone (7/14/2025  3:00 PM)  Is the patient taking all medications as directed (includes completed medication regime)?: No (7/14/2025  3:00 PM)  What is " preventing the patient from taking all medications as directed?: Other (Comment) (Patient says she is not taking metoprolol because at discharge someone told her to only take it if she started to have symptoms of afib again. I will reach out to the attending who discharged her.) (7/14/2025  3:00 PM)  Care Management Interventions: Provided patient education (7/14/2025  3:00 PM)    Appointments  Does the patient have a primary care provider?: Yes (7/14/2025  3:00 PM)  Care Management Interventions: Advised patient to make appointment (7/14/2025  3:00 PM)  Has the patient kept scheduled appointments due by today?: Yes (7/14/2025  3:00 PM)  Care Management Interventions: Educated on importance of keeping appointment (7/14/2025  3:00 PM)    Self Management  What is the home health agency?:  Home Health (7/14/2025  3:00 PM)  Has home health visited the patient within 72 hours of discharge?: Yes (7/14/2025  3:00 PM)  What Durable Medical Equipment (DME) was ordered?: NA (7/14/2025  3:00 PM)    Patient Teaching  Does the patient have access to their discharge instructions?: Yes (7/14/2025  3:00 PM)  Care Management Interventions: Reviewed instructions with patient (7/14/2025  3:00 PM)  What is the patient's perception of their health status since discharge?: Improving (7/14/2025  3:00 PM)

## 2025-07-14 NOTE — TELEPHONE ENCOUNTER
Spoke with Ms. Moreau regarding visit expectations and plan of care. Patient will have a virtual visit due to limited transportation options.    Pt is  from  at 121 pm.

## 2025-07-15 ENCOUNTER — PATIENT OUTREACH (OUTPATIENT)
Age: 75
End: 2025-07-15
Payer: COMMERCIAL

## 2025-07-15 NOTE — PROGRESS NOTES
I heard back from the attending who discharged her from the hospital regarding the metoprolol. He says she needs to be on it as prescribed (12.5mg bid) and not as needed like the patient had originally thought. I called and clarified this with the patient and she agreed to take it as prescribed.

## 2025-07-15 NOTE — PROGRESS NOTES
"Alyssa Moreau  is a 75 y.o. female who presents for evaluation of hiatal hernia status post Robotic HHR with partial fundoplication on 7/8/25.     This patient presented to her gastroenterologist with worsening symptoms of reflux and dysphagia.  She notes dysphagia to solid foods such as bread rice and chicken.  She has been treating this with Pepcid without improvement.  Her gastroenterologist (Enrique) recommended an EGD, which was performed on 1/24/2024 and showed a small type II hiatal hernia with the GE junction at 38 cm from the incisors.  Biopsies of the GE junction showed no evidence of Johnston's esophagus.  Her gastro doctor also recommended twice daily PPI treatment, which has helped her symptoms, but they remain significant.  I recommended surgical hiatal hernia repair.  This was performed on 7/8/2025 and complicated by a tracheal injury seen on radiographic imaging leading to significant subcutaneous emphysema.  She was ultimately discharged home with \"conservative\" management.    Currently the patient is presenting for post-operative evaluation. She is tolerating a liquid diet, and she is not having any reflux, which is better than prior to sugery. She denies the following symptoms: chest pain, shortness of breath at rest, cough, hemoptysis, fevers, chills, weight loss, abdominal pain, difficulty swallowing, vomiting, and reflux symptoms. She is having \"baby pasty poop\", but she does feel bloated and \"full of air\" just now.     There have been no significant changes to their documented medical, surgical and family history.     She  reports that she has never smoked. She has never used smokeless tobacco. She reports current alcohol use. She reports that she does not use drugs.    Objective   Physical Exam  The patient is well-appearing and in no acute distress. The trachea is midline and there is no crepitus. The lungs were clear to auscultation grossly. There was good effort and excursion. " The heart had a regular rate and rhythm. The abdomen was soft, nontender and nondistended. The extremities had no edema or gross deformities. Mood and affect are appropriate.  Diagnostic Studies  I reviewed a post-operative CXR which shows no significant pleural effusion or pneumothorax, no subcutaneous emphysema visible    !cm ggo noted on Ct chest in house    Assessment/Plan   I believe that the patient is doing well.     The patient is recently status post surgical intervention.  I believe they are recovering appropriately.  Their clinical and/or radiographic presentation today suggest no evidence of complications regarding her tracheal injury.    I am pleased that the patient has had overall symptomatic improvement. I believe that this portends a favorable prognosis and I'm optimistic that they will have durable improvement in their symptoms of GERD    Lung nodule almost certainly benign, this can be imaged in 6 months. I think this is preferredto biopsy or surgery.     I recommend CT chest in 6 months     I discussed this in detail with the patient, including a discussion of alternatives. They were comfortable with this approach.     Jairon Chapman MD  [phone]

## 2025-07-16 ENCOUNTER — TELEPHONE (OUTPATIENT)
Dept: CRITICAL CARE MEDICINE | Facility: HOSPITAL | Age: 75
End: 2025-07-16
Payer: COMMERCIAL

## 2025-07-16 ENCOUNTER — HOME CARE VISIT (OUTPATIENT)
Dept: HOME HEALTH SERVICES | Facility: HOME HEALTH | Age: 75
End: 2025-07-16
Payer: COMMERCIAL

## 2025-07-17 ENCOUNTER — TELEMEDICINE (OUTPATIENT)
Dept: PRIMARY CARE | Facility: CLINIC | Age: 75
End: 2025-07-17
Payer: COMMERCIAL

## 2025-07-17 DIAGNOSIS — R91.8 GROUND GLASS OPACITY PRESENT ON IMAGING OF LUNG: Primary | ICD-10-CM

## 2025-07-17 LAB
ATRIAL RATE: 258 BPM
DIASTOLIC BLOOD PRESSURE: 86 MMHG
Q ONSET: 221 MS
QRS COUNT: 15 BEATS
QRS DURATION: 86 MS
QT INTERVAL: 332 MS
QTC CALCULATION(BAZETT): 417 MS
QTC FREDERICIA: 386 MS
R AXIS: 39 DEGREES
SYSTOLIC BLOOD PRESSURE: 144 MMHG
T AXIS: -57 DEGREES
T OFFSET: 387 MS
VENTRICULAR RATE: 95 BPM

## 2025-07-17 PROCEDURE — 1126F AMNT PAIN NOTED NONE PRSNT: CPT | Performed by: NURSE PRACTITIONER

## 2025-07-17 PROCEDURE — 1160F RVW MEDS BY RX/DR IN RCRD: CPT | Performed by: NURSE PRACTITIONER

## 2025-07-17 PROCEDURE — 1111F DSCHRG MED/CURRENT MED MERGE: CPT | Performed by: NURSE PRACTITIONER

## 2025-07-17 PROCEDURE — 1036F TOBACCO NON-USER: CPT | Performed by: NURSE PRACTITIONER

## 2025-07-17 PROCEDURE — 1159F MED LIST DOCD IN RCRD: CPT | Performed by: NURSE PRACTITIONER

## 2025-07-17 PROCEDURE — 99202 OFFICE O/P NEW SF 15 MIN: CPT | Performed by: NURSE PRACTITIONER

## 2025-07-17 ASSESSMENT — ENCOUNTER SYMPTOMS
LOSS OF SENSATION IN FEET: 0
DEPRESSION: 0
OCCASIONAL FEELINGS OF UNSTEADINESS: 0

## 2025-07-17 ASSESSMENT — PAIN SCALES - GENERAL: PAINLEVEL_OUTOF10: 0-NO PAIN

## 2025-07-17 NOTE — PROGRESS NOTES
"Subjective   Patient ID: Dunia Moreau \"Erin\" is a 75 y.o. female who presents for No chief complaint on file..  HPI 75-year-old female presents today for lung nodule clinic.    Telehealth visit between Dunia Moreau and Thalia Clayton CNP at [facility] lung nodule clinic per patient request.    Smoking history: Never  Personal history of cancer:No   Family history of lung cancer:Mother had pre cancerous cells and had a hysterectomy. Two paternal aunts had cancer. One had bone and the other had uterine cancer. Her fathers Uncles had cancer.     CT chest without IV contrast dated 7/10/2025  Right apical ground-glass density nodule measuring 1 cm that would  warrant follow-up as it could represent infectious or inflammatory  process, however low-grade neoplasm could not be entirely excluded.  Please see below guidelines for suggested follow-up    Review of Systems  Review of systems: Present-feeling well. Not present-chills, fatigue and fever.  Respiratory: seasonal allergies, cough.  Not present-bloody sputum.  Cardiovascular: Not present-chest pain, palpitations, dyspnea on exertion.  Objective   There were no vitals taken for this visit.     Physical Exam  Gen.: Mental status-alert. Gen. appearance-cooperative, well groomed and consistent with stated age. Not in acute distress or sickly. Orientation-oriented to time, place, purpose and person. Build and nutrition-well-nourished and well-developed. Hydration-well-hydrated.  Assessment/Plan   Diagnoses and all orders for this visit:  Ground glass opacity present on imaging of lung  -     CT chest wo IV contrast; Future        "

## 2025-07-17 NOTE — PATIENT INSTRUCTIONS
1 cm groundglass right apical lung nodule noted on CT chest without IV contrast dated 7/10/2025.  Recommend CT chest 6 months.  Order placed.      [facility]  [address]  [address]  [place], [place] [address]  Phone [phone]  Fax [phone]  Nurse Coordinator [phone]                                          Welcome to the [facility]    Today was the initial consult with the lung nodule clinic to determine proper recommendations for follow up. Your care is coordinated to ensure timely management.  As you know, early detection of cancer is very important.  Nodules that are large, look suspicious or have changed over time is why further evaluation such as the additional imaging test that we have ordered is needed. Our clinic will work closely with you in choosing the best next step.       What is my next step?  We will assist with scheduling scans, results reviews, and referrals for priority appointments.      Who do I call?  Your care coordinator for the lung nodule clinic can be contacted at [phone]  All scheduling needs can be assisted within the Cardiac Surgery/Thoracic Surgery/Lung Nodule Clinic offices at [phone].              Table  Patric H, Ally DP, Negrita MARSHALL, et al. Guidelines for Management of Incidental Pulmonary Nodules Detected on CT Images: From the Fleischner Society 2017. Radiology 2017;284:228-243.

## 2025-07-18 ENCOUNTER — HOME CARE VISIT (OUTPATIENT)
Dept: HOME HEALTH SERVICES | Facility: HOME HEALTH | Age: 75
End: 2025-07-18
Payer: COMMERCIAL

## 2025-07-18 PROCEDURE — G0151 HHCP-SERV OF PT,EA 15 MIN: HCPCS

## 2025-07-18 SDOH — HEALTH STABILITY: PHYSICAL HEALTH: EXERCISE ACTIVITIES SETS: 2

## 2025-07-18 SDOH — HEALTH STABILITY: PHYSICAL HEALTH: EXERCISE ACTIVITY: SKILLED THERAPEUTIC EXERCISES

## 2025-07-18 SDOH — HEALTH STABILITY: PHYSICAL HEALTH: EXERCISE ACTIVITY: GAIT TRAINING

## 2025-07-18 SDOH — HEALTH STABILITY: PHYSICAL HEALTH: EXERCISE TYPE: SKILLED THERAPEUTIC EXERCISES

## 2025-07-18 SDOH — HEALTH STABILITY: PHYSICAL HEALTH: PHYSICAL EXERCISE: 10

## 2025-07-18 SDOH — HEALTH STABILITY: PHYSICAL HEALTH: PHYSICAL EXERCISE: SITTING, STANDING

## 2025-07-18 SDOH — HEALTH STABILITY: PHYSICAL HEALTH: RESISTANCE: AS TOLERATED

## 2025-07-18 ASSESSMENT — ACTIVITIES OF DAILY LIVING (ADL)
AMBULATION ASSISTANCE ON FLAT SURFACES: 1
AMBULATION_DISTANCE/DURATION_TOLERATED: 30 FT

## 2025-07-18 ASSESSMENT — ENCOUNTER SYMPTOMS
DENIES PAIN: 1
PERSON REPORTING PAIN: PATIENT

## 2025-07-21 ENCOUNTER — HOME CARE VISIT (OUTPATIENT)
Dept: HOME HEALTH SERVICES | Facility: HOME HEALTH | Age: 75
End: 2025-07-21
Payer: COMMERCIAL

## 2025-07-21 PROCEDURE — G0151 HHCP-SERV OF PT,EA 15 MIN: HCPCS

## 2025-07-21 SDOH — HEALTH STABILITY: PHYSICAL HEALTH: RESISTANCE: AS TOLERATED

## 2025-07-21 SDOH — HEALTH STABILITY: PHYSICAL HEALTH: EXERCISE TYPE: SKILLED THERAPEUTIC EXERCISES

## 2025-07-21 SDOH — HEALTH STABILITY: PHYSICAL HEALTH: PHYSICAL EXERCISE: SITTING, STANDING

## 2025-07-21 SDOH — HEALTH STABILITY: PHYSICAL HEALTH: PHYSICAL EXERCISE: 10

## 2025-07-21 SDOH — HEALTH STABILITY: PHYSICAL HEALTH: EXERCISE ACTIVITY: SKILLED THERAPEUTIC EXERCISES

## 2025-07-21 SDOH — HEALTH STABILITY: PHYSICAL HEALTH: EXERCISE ACTIVITIES SETS: 2

## 2025-07-21 ASSESSMENT — ENCOUNTER SYMPTOMS
DENIES PAIN: 1
PERSON REPORTING PAIN: PATIENT

## 2025-07-21 ASSESSMENT — ACTIVITIES OF DAILY LIVING (ADL): AMBULATION ASSISTANCE ON FLAT SURFACES: 1

## 2025-07-22 ENCOUNTER — TELEPHONE (OUTPATIENT)
Dept: SURGERY | Facility: CLINIC | Age: 75
End: 2025-07-22
Payer: COMMERCIAL

## 2025-07-22 NOTE — TELEPHONE ENCOUNTER
Phone call made to patient regarding her upcoming post op appointment.  Reminded patient that she needs to have a chest xray done prior to her appointment. Explained that she can have her xray done at [facility] in the main radiology located in the main hospital on the first floor.  Explained that no appointment was necessary as they are walk in only, however she should arrive 1 hr before her appointment. Office number left with patient should she have any further questions.

## 2025-07-23 ENCOUNTER — HOSPITAL ENCOUNTER (OUTPATIENT)
Dept: RADIOLOGY | Facility: CLINIC | Age: 75
Discharge: HOME | End: 2025-07-23
Payer: COMMERCIAL

## 2025-07-23 ENCOUNTER — OFFICE VISIT (OUTPATIENT)
Dept: SURGERY | Facility: CLINIC | Age: 75
End: 2025-07-23
Payer: COMMERCIAL

## 2025-07-23 VITALS
DIASTOLIC BLOOD PRESSURE: 84 MMHG | HEART RATE: 73 BPM | OXYGEN SATURATION: 96 % | WEIGHT: 204.2 LBS | SYSTOLIC BLOOD PRESSURE: 155 MMHG | TEMPERATURE: 97 F | HEIGHT: 63 IN | BODY MASS INDEX: 36.18 KG/M2

## 2025-07-23 DIAGNOSIS — K44.9 HIATAL HERNIA: ICD-10-CM

## 2025-07-23 DIAGNOSIS — R91.1 LUNG NODULE: ICD-10-CM

## 2025-07-23 PROCEDURE — 99211 OFF/OP EST MAY X REQ PHY/QHP: CPT | Performed by: THORACIC SURGERY (CARDIOTHORACIC VASCULAR SURGERY)

## 2025-07-23 PROCEDURE — 1111F DSCHRG MED/CURRENT MED MERGE: CPT | Performed by: THORACIC SURGERY (CARDIOTHORACIC VASCULAR SURGERY)

## 2025-07-23 PROCEDURE — 71046 X-RAY EXAM CHEST 2 VIEWS: CPT | Performed by: RADIOLOGY

## 2025-07-23 PROCEDURE — 1125F AMNT PAIN NOTED PAIN PRSNT: CPT | Performed by: THORACIC SURGERY (CARDIOTHORACIC VASCULAR SURGERY)

## 2025-07-23 PROCEDURE — 71046 X-RAY EXAM CHEST 2 VIEWS: CPT

## 2025-07-23 PROCEDURE — 1159F MED LIST DOCD IN RCRD: CPT | Performed by: THORACIC SURGERY (CARDIOTHORACIC VASCULAR SURGERY)

## 2025-07-23 ASSESSMENT — ENCOUNTER SYMPTOMS
DEPRESSION: 0
OCCASIONAL FEELINGS OF UNSTEADINESS: 0
LOSS OF SENSATION IN FEET: 0

## 2025-07-23 ASSESSMENT — PAIN SCALES - GENERAL: PAINLEVEL_OUTOF10: 5

## 2025-07-24 ENCOUNTER — APPOINTMENT (OUTPATIENT)
Dept: PRIMARY CARE | Facility: CLINIC | Age: 75
End: 2025-07-24
Payer: COMMERCIAL

## 2025-07-25 ENCOUNTER — HOME CARE VISIT (OUTPATIENT)
Dept: HOME HEALTH SERVICES | Facility: HOME HEALTH | Age: 75
End: 2025-07-25
Payer: COMMERCIAL

## 2025-07-25 VITALS
OXYGEN SATURATION: 96 % | TEMPERATURE: 98.1 F | SYSTOLIC BLOOD PRESSURE: 164 MMHG | RESPIRATION RATE: 18 BRPM | HEART RATE: 61 BPM | DIASTOLIC BLOOD PRESSURE: 82 MMHG

## 2025-07-25 PROCEDURE — G0299 HHS/HOSPICE OF RN EA 15 MIN: HCPCS

## 2025-07-25 ASSESSMENT — ENCOUNTER SYMPTOMS
CHANGE IN APPETITE: UNCHANGED
PAIN: 1
MUSCLE WEAKNESS: 1
PAIN LOCATION - RELIEVING FACTORS: TYLENOL
APPETITE LEVEL: FAIR
PAIN LOCATION - PAIN SEVERITY: 4/10
PAIN LOCATION - PAIN QUALITY: ACHY
PAIN LOCATION - PAIN FREQUENCY: INTERMITTENT
LOWEST PAIN SEVERITY IN PAST 24 HOURS: 0/10
LAST BOWEL MOVEMENT: 67410
PAIN LOCATION: ABDOMEN
STOOL FREQUENCY: DAILY
PAIN LOCATION - PAIN FREQUENCY: INTERMITTENT
PAIN LOCATION - PAIN SEVERITY: 4/10
PAIN LOCATION - RELIEVING FACTORS: TYLENOL
BOWEL PATTERN NORMAL: 1
PAIN LOCATION - EXACERBATING FACTORS: UNSURE
SUBJECTIVE PAIN PROGRESSION: GRADUALLY IMPROVING
PERSON REPORTING PAIN: PATIENT
PAIN LOCATION - PAIN QUALITY: ACHY
HIGHEST PAIN SEVERITY IN PAST 24 HOURS: 8/10
PAIN LOCATION: BACK
PAIN LOCATION - EXACERBATING FACTORS: SITTING
PAIN SEVERITY GOAL: 0/10

## 2025-07-25 ASSESSMENT — ACTIVITIES OF DAILY LIVING (ADL)
LIGHT HOUSEKEEPING: NEEDS ASSISTANCE
PREPARING MEALS: NEEDS ASSISTANCE
TRANSPORTATION ASSESSED: 1
TRANSPORTATION: NEEDS ASSISTANCE
CURRENT_FUNCTION: STAND BY ASSIST
HOUSEKEEPING ASSESSED: 1
PHYSICAL TRANSFERS ASSESSED: 1

## 2025-07-29 ENCOUNTER — HOME CARE VISIT (OUTPATIENT)
Dept: HOME HEALTH SERVICES | Facility: HOME HEALTH | Age: 75
End: 2025-07-29
Payer: COMMERCIAL

## 2025-07-29 VITALS
DIASTOLIC BLOOD PRESSURE: 88 MMHG | OXYGEN SATURATION: 98 % | RESPIRATION RATE: 18 BRPM | SYSTOLIC BLOOD PRESSURE: 158 MMHG | HEART RATE: 86 BPM | TEMPERATURE: 96.9 F

## 2025-07-29 PROCEDURE — G0151 HHCP-SERV OF PT,EA 15 MIN: HCPCS

## 2025-07-29 PROCEDURE — G0299 HHS/HOSPICE OF RN EA 15 MIN: HCPCS

## 2025-07-29 SDOH — HEALTH STABILITY: PHYSICAL HEALTH: PHYSICAL EXERCISE: SITTING, STANDING, SUPINE

## 2025-07-29 SDOH — HEALTH STABILITY: PHYSICAL HEALTH: EXERCISE ACTIVITY: SKILLED THERAPEUTIC EXERCISES

## 2025-07-29 SDOH — HEALTH STABILITY: PHYSICAL HEALTH: EXERCISE ACTIVITY: GAIT TRAINING

## 2025-07-29 SDOH — HEALTH STABILITY: PHYSICAL HEALTH: RESISTANCE: AS TOLERATED

## 2025-07-29 SDOH — HEALTH STABILITY: PHYSICAL HEALTH: EXERCISE ACTIVITY: STAIR TRAINING

## 2025-07-29 SDOH — HEALTH STABILITY: PHYSICAL HEALTH: PHYSICAL EXERCISE: 10

## 2025-07-29 SDOH — HEALTH STABILITY: PHYSICAL HEALTH: EXERCISE TYPE: SKILLED THERAPEUTIC EXERCISES

## 2025-07-29 SDOH — HEALTH STABILITY: PHYSICAL HEALTH: EXERCISE ACTIVITIES SETS: 2

## 2025-07-29 ASSESSMENT — ACTIVITIES OF DAILY LIVING (ADL)
PHYSICAL_TRANSFERS_DEVICES: BODY SUPPORT
AMBULATION ASSISTANCE: INDEPENDENT
AMBULATION ASSISTANCE: 1
AMBULATION ASSISTANCE ON FLAT SURFACES: 1
AMBULATION ASSISTANCE: INDEPENDENT
PHYSICAL TRANSFERS ASSESSED: 1
AMBULATION ASSISTANCE: 1
OASIS_M1830: 00
HOME_HEALTH_OASIS: 00
CURRENT_FUNCTION: MAXIMUM ASSIST

## 2025-07-29 ASSESSMENT — ENCOUNTER SYMPTOMS
PAIN LOCATION - PAIN DURATION: INTERMITTENT
PAIN LOCATION - PAIN FREQUENCY: INTERMITTENT
PAIN LOCATION - PAIN SEVERITY: 2/10
APPETITE LEVEL: GOOD
PAIN LOCATION - PAIN QUALITY: ACHY
PERSON REPORTING PAIN: PATIENT
CHANGE IN APPETITE: UNCHANGED
PAIN LOCATION - RELIEVING FACTORS: REST
DENIES PAIN: 1
BOWEL PATTERN NORMAL: 1
PAIN: 1
PERSON REPORTING PAIN: PATIENT
PAIN LOCATION: ABDOMEN
STOOL FREQUENCY: DAILY
LAST BOWEL MOVEMENT: 67415

## 2025-07-29 NOTE — HOME HEALTH
S: Patient seen for home health PT discharge visit today. Patient reported no pain and has been compliant with HEP.    O: Patient ambulated in the home using no AD.    P: Patient discharged from home health PT today as patient has achieved optimal PT goals and has been independent in performing the HEP in correct pattern. Patient in agreement with the discharge plan.

## 2025-08-08 DIAGNOSIS — E03.9 ACQUIRED HYPOTHYROIDISM: ICD-10-CM

## 2025-08-08 RX ORDER — LEVOTHYROXINE SODIUM 50 UG/1
50 TABLET ORAL DAILY
Qty: 90 TABLET | Refills: 1 | Status: SHIPPED | OUTPATIENT
Start: 2025-08-08

## 2025-08-11 ENCOUNTER — TELEPHONE (OUTPATIENT)
Dept: GASTROENTEROLOGY | Facility: CLINIC | Age: 75
End: 2025-08-11
Payer: COMMERCIAL

## 2025-08-11 DIAGNOSIS — R13.19 ESOPHAGEAL DYSPHAGIA: ICD-10-CM

## 2025-08-11 DIAGNOSIS — K21.9 GASTROESOPHAGEAL REFLUX DISEASE WITHOUT ESOPHAGITIS: ICD-10-CM

## 2025-08-11 RX ORDER — PANTOPRAZOLE SODIUM 40 MG/1
40 TABLET, DELAYED RELEASE ORAL
Qty: 30 TABLET | Refills: 5 | Status: SHIPPED | OUTPATIENT
Start: 2025-08-11 | End: 2026-08-11

## 2025-08-27 ENCOUNTER — HOSPITAL ENCOUNTER (OUTPATIENT)
Dept: RADIOLOGY | Facility: CLINIC | Age: 75
Discharge: HOME | End: 2025-08-27
Payer: COMMERCIAL

## 2025-08-27 VITALS — WEIGHT: 204.2 LBS | HEIGHT: 63 IN | BODY MASS INDEX: 36.18 KG/M2

## 2025-08-27 DIAGNOSIS — Z12.31 ENCOUNTER FOR SCREENING MAMMOGRAM FOR MALIGNANT NEOPLASM OF BREAST: ICD-10-CM

## 2025-08-27 PROCEDURE — 77067 SCR MAMMO BI INCL CAD: CPT | Performed by: RADIOLOGY

## 2025-08-27 PROCEDURE — 77063 BREAST TOMOSYNTHESIS BI: CPT | Performed by: RADIOLOGY

## 2025-08-27 PROCEDURE — 77067 SCR MAMMO BI INCL CAD: CPT

## 2025-09-03 ENCOUNTER — APPOINTMENT (OUTPATIENT)
Age: 75
End: 2025-09-03
Payer: COMMERCIAL

## 2025-09-03 PROBLEM — E66.812 CLASS 2 SEVERE OBESITY DUE TO EXCESS CALORIES WITH SERIOUS COMORBIDITY AND BODY MASS INDEX (BMI) OF 39.0 TO 39.9 IN ADULT: Status: ACTIVE | Noted: 2025-09-03

## 2025-09-03 PROBLEM — E66.01 CLASS 2 SEVERE OBESITY DUE TO EXCESS CALORIES WITH SERIOUS COMORBIDITY AND BODY MASS INDEX (BMI) OF 39.0 TO 39.9 IN ADULT: Status: ACTIVE | Noted: 2025-09-03

## 2025-09-03 ASSESSMENT — PATIENT HEALTH QUESTIONNAIRE - PHQ9
1. LITTLE INTEREST OR PLEASURE IN DOING THINGS: NOT AT ALL
2. FEELING DOWN, DEPRESSED OR HOPELESS: SEVERAL DAYS
SUM OF ALL RESPONSES TO PHQ9 QUESTIONS 1 AND 2: 1

## 2025-09-03 ASSESSMENT — ENCOUNTER SYMPTOMS
LOSS OF SENSATION IN FEET: 0
DEPRESSION: 0
OCCASIONAL FEELINGS OF UNSTEADINESS: 0

## 2025-09-03 ASSESSMENT — ACTIVITIES OF DAILY LIVING (ADL)
TAKING_MEDICATION: INDEPENDENT
DOING_HOUSEWORK: INDEPENDENT
BATHING: INDEPENDENT
GROCERY_SHOPPING: INDEPENDENT
MANAGING_FINANCES: INDEPENDENT
DRESSING: INDEPENDENT

## 2025-09-05 ENCOUNTER — HOSPITAL ENCOUNTER (OUTPATIENT)
Dept: CARDIOLOGY | Facility: HOSPITAL | Age: 75
Discharge: HOME | End: 2025-09-05
Payer: COMMERCIAL

## 2025-09-05 DIAGNOSIS — I34.1 MITRAL VALVE PROLAPSE: ICD-10-CM

## 2025-09-05 DIAGNOSIS — I48.91 NEW ONSET A-FIB (MULTI): ICD-10-CM

## 2025-09-05 PROCEDURE — 93306 TTE W/DOPPLER COMPLETE: CPT

## 2025-09-05 PROCEDURE — 93306 TTE W/DOPPLER COMPLETE: CPT | Performed by: STUDENT IN AN ORGANIZED HEALTH CARE EDUCATION/TRAINING PROGRAM

## 2025-09-05 PROCEDURE — 9420000001 HC RT PATIENT EDUCATION 5 MIN

## 2025-09-05 PROCEDURE — 93225 XTRNL ECG REC<48 HRS REC: CPT

## 2025-09-07 LAB
AORTIC VALVE MEAN GRADIENT: 4 MMHG
AORTIC VALVE PEAK VELOCITY: 1.4 M/S
AV PEAK GRADIENT: 8 MMHG
AVA (PEAK VEL): 1.89 CM2
AVA (VTI): 1.96 CM2
EJECTION FRACTION APICAL 4 CHAMBER: 66.5
EJECTION FRACTION: 63 %
LEFT ATRIUM VOLUME AREA LENGTH INDEX BSA: 16.8 ML/M2
LEFT VENTRICLE INTERNAL DIMENSION DIASTOLE: 4.78 CM (ref 3.5–6)
LEFT VENTRICULAR OUTFLOW TRACT DIAMETER: 1.8 CM
LV EJECTION FRACTION BIPLANE: 63 %
MITRAL VALVE E/A RATIO: 0.79
RIGHT VENTRICLE FREE WALL PEAK S': 13.4 CM/S
RIGHT VENTRICLE PEAK SYSTOLIC PRESSURE: 28 MMHG
TRICUSPID ANNULAR PLANE SYSTOLIC EXCURSION: 2.7 CM

## 2025-09-07 ASSESSMENT — ENCOUNTER SYMPTOMS
DIARRHEA: 0
SHORTNESS OF BREATH: 0
DIZZINESS: 0
LIGHT-HEADEDNESS: 0
ARTHRALGIAS: 0
CONSTIPATION: 0
DYSURIA: 0
ABDOMINAL PAIN: 0
FATIGUE: 0
VOMITING: 0
PSYCHIATRIC NEGATIVE: 1
BLOOD IN STOOL: 0
CHEST TIGHTNESS: 0
HEADACHES: 0
MYALGIAS: 0
COLOR CHANGE: 0
NAUSEA: 0
PALPITATIONS: 0

## 2025-09-25 ENCOUNTER — APPOINTMENT (OUTPATIENT)
Dept: ORTHOPEDIC SURGERY | Facility: CLINIC | Age: 75
End: 2025-09-25
Payer: COMMERCIAL

## 2026-03-04 ENCOUNTER — APPOINTMENT (OUTPATIENT)
Age: 76
End: 2026-03-04
Payer: COMMERCIAL

## (undated) DEVICE — OBTURATOR, BLADELESS , SU

## (undated) DEVICE — FORCEPS, CADIERE, DAVINCI XI

## (undated) DEVICE — CAUTERY, PENCIL, PUSH BUTTON, SMOKE EVAC, 70MM

## (undated) DEVICE — APPLICATOR, CHLORAPREP, W/ORANGE TINT, 26ML

## (undated) DEVICE — DRIVER, NEEDLE, MEGA SUTURE CUT, DAVINCI XI

## (undated) DEVICE — SEAL, UNIVERSAL, 5-12MM

## (undated) DEVICE — ACCESS PORT, 5MM, 120MM LENGTH LOW PRO W/BLADELESS OPTICAL TIP

## (undated) DEVICE — DRAPE, COLUMN, DAVINCI XI

## (undated) DEVICE — DRAPE, SHEET, THREE QUARTER, FAN FOLD, 57 X 77 IN

## (undated) DEVICE — SYRINGE, 20 CC, LUER SLIP

## (undated) DEVICE — PROTECTOR, HEEL/ANKLE/ELBOW, UNIVERSAL

## (undated) DEVICE — DRAPE, ARM XI

## (undated) DEVICE — SUTURE, V-LOC PBT, SIZE 0, GS-22, 9 IN, BLUE, NONABS

## (undated) DEVICE — TUBING SET, TRI-LUMEN, FILTERED, F/AIRSEAL

## (undated) DEVICE — CLEAN KIT, ANTIFOG SCOPE, SEE SHARP 150MM

## (undated) DEVICE — Device

## (undated) DEVICE — SLEEVE, VASO PRESS, CALF GARMENT, MEDIUM, GREEN

## (undated) DEVICE — SYRINGE, 30 CC, LUER LOCK

## (undated) DEVICE — TROCAR, KII OPTICAL BLADELESS 5MM Z THREAD 100MM LNGTH

## (undated) DEVICE — GRASPER, FENESTRATED TIP-UP XI

## (undated) DEVICE — SYNCHROSEAL, IS 4000, 8 MM

## (undated) DEVICE — LUBRICANT, ELECTROLUBE, F/ELECTRODE TIPS